# Patient Record
Sex: FEMALE | Race: WHITE | NOT HISPANIC OR LATINO | Employment: OTHER | URBAN - METROPOLITAN AREA
[De-identification: names, ages, dates, MRNs, and addresses within clinical notes are randomized per-mention and may not be internally consistent; named-entity substitution may affect disease eponyms.]

---

## 2019-10-29 ENCOUNTER — RECORDS - HEALTHEAST (OUTPATIENT)
Dept: LAB | Facility: CLINIC | Age: 84
End: 2019-10-29

## 2019-10-29 LAB
ANION GAP SERPL CALCULATED.3IONS-SCNC: 7 MMOL/L (ref 5–18)
BASOPHILS # BLD AUTO: 0.1 THOU/UL (ref 0–0.2)
BASOPHILS NFR BLD AUTO: 1 % (ref 0–2)
BUN SERPL-MCNC: 16 MG/DL (ref 8–28)
CALCIUM SERPL-MCNC: 8.9 MG/DL (ref 8.5–10.5)
CHLORIDE BLD-SCNC: 99 MMOL/L (ref 98–107)
CO2 SERPL-SCNC: 29 MMOL/L (ref 22–31)
CREAT SERPL-MCNC: 0.62 MG/DL (ref 0.6–1.1)
EOSINOPHIL # BLD AUTO: 0.1 THOU/UL (ref 0–0.4)
EOSINOPHIL NFR BLD AUTO: 2 % (ref 0–6)
ERYTHROCYTE [DISTWIDTH] IN BLOOD BY AUTOMATED COUNT: 13.3 % (ref 11–14.5)
GFR SERPL CREATININE-BSD FRML MDRD: >60 ML/MIN/1.73M2
GLUCOSE BLD-MCNC: 75 MG/DL (ref 70–125)
HCT VFR BLD AUTO: 35.2 % (ref 35–47)
HGB BLD-MCNC: 11.3 G/DL (ref 12–16)
LYMPHOCYTES # BLD AUTO: 2.1 THOU/UL (ref 0.8–4.4)
LYMPHOCYTES NFR BLD AUTO: 29 % (ref 20–40)
MCH RBC QN AUTO: 29.7 PG (ref 27–34)
MCHC RBC AUTO-ENTMCNC: 32.1 G/DL (ref 32–36)
MCV RBC AUTO: 92 FL (ref 80–100)
MONOCYTES # BLD AUTO: 0.7 THOU/UL (ref 0–0.9)
MONOCYTES NFR BLD AUTO: 10 % (ref 2–10)
NEUTROPHILS # BLD AUTO: 4.2 THOU/UL (ref 2–7.7)
NEUTROPHILS NFR BLD AUTO: 59 % (ref 50–70)
PLATELET # BLD AUTO: 474 THOU/UL (ref 140–440)
PMV BLD AUTO: 9.3 FL (ref 8.5–12.5)
POTASSIUM BLD-SCNC: 5.1 MMOL/L (ref 3.5–5)
RBC # BLD AUTO: 3.81 MILL/UL (ref 3.8–5.4)
SODIUM SERPL-SCNC: 135 MMOL/L (ref 136–145)
WBC: 7.2 THOU/UL (ref 4–11)

## 2019-10-31 ENCOUNTER — RECORDS - HEALTHEAST (OUTPATIENT)
Dept: LAB | Facility: CLINIC | Age: 84
End: 2019-10-31

## 2019-10-31 LAB
ANION GAP SERPL CALCULATED.3IONS-SCNC: 7 MMOL/L (ref 5–18)
BUN SERPL-MCNC: 23 MG/DL (ref 8–28)
CALCIUM SERPL-MCNC: 8.7 MG/DL (ref 8.5–10.5)
CHLORIDE BLD-SCNC: 97 MMOL/L (ref 98–107)
CO2 SERPL-SCNC: 28 MMOL/L (ref 22–31)
CREAT SERPL-MCNC: 0.69 MG/DL (ref 0.6–1.1)
GFR SERPL CREATININE-BSD FRML MDRD: >60 ML/MIN/1.73M2
GLUCOSE BLD-MCNC: 80 MG/DL (ref 70–125)
POTASSIUM BLD-SCNC: 4.3 MMOL/L (ref 3.5–5)
SODIUM SERPL-SCNC: 132 MMOL/L (ref 136–145)

## 2020-02-03 PROBLEM — Z79.899 CONTROLLED SUBSTANCE AGREEMENT SIGNED: Status: ACTIVE | Noted: 2017-07-26

## 2020-02-03 PROBLEM — F02.80 DEMENTIA IN ALZHEIMER'S DISEASE (H): Status: ACTIVE | Noted: 2019-06-13

## 2020-02-03 PROBLEM — M48.061 SPINAL STENOSIS OF LUMBAR REGION WITHOUT NEUROGENIC CLAUDICATION: Status: ACTIVE | Noted: 2019-11-19

## 2020-02-03 PROBLEM — G30.9 DEMENTIA IN ALZHEIMER'S DISEASE (H): Status: ACTIVE | Noted: 2019-06-13

## 2020-02-03 PROBLEM — I87.2 VENOUS STASIS DERMATITIS OF LEFT LOWER EXTREMITY: Status: ACTIVE | Noted: 2019-07-09

## 2020-02-03 PROBLEM — K59.03 DRUG-INDUCED CONSTIPATION: Status: ACTIVE | Noted: 2019-07-09

## 2020-02-03 PROBLEM — S32.000A CLOSED COMPRESSION FRACTURE OF LUMBOSACRAL SPINE (H): Status: ACTIVE | Noted: 2017-08-23

## 2020-02-03 PROBLEM — M25.552 PAIN OF LEFT HIP JOINT: Status: ACTIVE | Noted: 2017-07-26

## 2020-02-03 PROBLEM — R41.82 ALTERED MENTAL STATUS: Status: ACTIVE | Noted: 2019-12-24

## 2020-02-03 PROBLEM — G89.4 CHRONIC PAIN DISORDER: Status: ACTIVE | Noted: 2019-11-19

## 2020-02-03 NOTE — PROGRESS NOTES
[facility]  PRIMARY CARE PROVIDER AND CLINIC:  TASHIA Caro CNP, [address] [facility] [address] / [place] [address]  Chief Complaint   Patient presents with     Establish Care     [facility] Medical Record Number:  [id number]  Place of Service where encounter took place:  [facility] ([facility]) [[id number]]    HPI:    HPI information obtained from: facility chart records, facility staff, patient report, [facility] chart review and Care Everywhere [facility] chart review.     Litzy Kenny  is a 87 year old  (4/10/1932) female with a medical history of atrial fibrillation, chronic back pain with compression fractures and stenosis, dementia, anemia and osteoporosis. She was previously living at home independently with her spouse whom helped care for her. She had a fall at home and developed worsening back pain and altered mental status. She was taken to the ED for evaluation and found to have an acute compression fracture and L1 and L3, and increase in severe central compression fracture of L2. Oxycodone was increased from 30 mg to 40 mg for pain control but had worsening mental status so subsequently changed to dilaudid. [facility] hospital stay 12/23/19-12/27/19. She then transferred to [facility] TCU for stay 12/27/19-1/22/20. While there, she developed worsening cognition while on the dilaudid so this was decreased from QID to TID and she did not have rebound in pain. She was ambulating up to 300 feet with FWW and further status as noted below.   Given the need for increased care needs, her and spouse moved into the [facility] for further assistance.     Mobility: Ambulating with a front-wheeled walker independently up to 300 feet.  Balance: Tinetti Balance Assessment 20/28 (24-28 = low fall risk; 19-23 = medium fall risk; 0-18 = high fall risk)  ADLs: Upper Extremity Dressing: independent  Lower Extremity Dressing: independent  Toileting: independent  Hygiene and Grooming: independent with  sponge bathing and moderate assistance with showers  Kitchen: dependent     Ms. Kenny was visited today while in her room, doing the dishes while her spouse was eating lunch. She asks that we come back for a visit after her spouse finished his lunch. Upon return to the room 30 minutes later, she invites us to sit down but becomes irritable when a paper and her purse was set to the side. She asked that things not be touched. She does not complain of back pain today and there is not pain radiating to either leg. She is not sleeping well, generally goes to bed around 2 a.m. she is having regular bowel movements. Denies palpitations. Eating fair. She states that she helps take care of her spouse.     Spoke with residents daughter today whom reports that her mother has had long term back pain in which she was taking upwards of 40-60 mg of oxycodone per day and they were working to taper down and was able to get to 20 mg per day this past summer. Family has noticed mood/personality changes with progression of dementia and feel she would benefit from an antidepressant if patient will allow. Daughter and her brother fear that their parents may not be able to live together in the future as there is tension between them.     CODE SDNR / DNITATUS/ADVANCE DIRECTIVES DISCUSSION:     Patient's living condition: lives with spouse  ALLERGIES: No clinical screening - see comments and Tramadol  PAST MEDICAL HISTORY:  has a past medical history of Cancer (H), Cataract, and Cystoid macular edema.  PAST SURGICAL HISTORY:   has a past surgical history that includes cataract iol, rt/lt.  FAMILY HISTORY: family history includes Amblyopia in her daughter; Cancer in her mother; Cerebrovascular Disease in her father; Macular Degeneration in her sister; Strabismus in her daughter.  SOCIAL HISTORY:   reports that she has never smoked. She has never used smokeless tobacco. She reports previous alcohol use.    Post Discharge Medication  Reconciliation Status: discharge medications reconciled, continue medications without change    Current Outpatient Medications   Medication Sig Dispense Refill     calcitonin, salmon, (MIACALCIN) 200 UNIT/ACT nasal spray Spray 1 spray into one nostril alternating nostrils daily Alternate nostril each day.       calcium carbonate-vitamin D (OS-KEYANNA) 600-400 MG-UNIT chewable tablet Take 1 chew tab by mouth 2 times daily       carvedilol (COREG) 3.125 MG tablet Take 1 tablet (3.125 mg) by mouth 2 times daily (with meals)       Cetaphil Moisturizing (CETAPHIL) external lotion Apply topically 2 times daily       HYDROmorphone (DILAUDID) 2 MG tablet Take 1 tablet (2 mg) by mouth every 6 hours as needed for pain 60 tablet 0     Lidocaine (LIDOCARE) 4 % Patch Place 1 patch onto the skin every 24 hours To prevent lidocaine toxicity, patient should be patch free for 12 hrs daily.       mirabegron (MYRBETRIQ) 50 MG 24 hr tablet Take 1 tablet (50 mg) by mouth daily       vitamin D3 (CHOLECALCIFEROL) 2000 units (50 mcg) tablet Take 1 tablet (2,000 Units) by mouth daily       ROS:  4 point ROS including Respiratory, CV, GI and , other than that noted in the HPI,  is negative    Vitals:  Exam:  GENERAL APPEARANCE:  Alert, in no distress, pleasant, cooperative  EYES: no discharge or mattering on lids or lashes noted  ENT:  moist mucous membranes, hearing acuity intact  NECK: supple, symmetrical  RESP: no respiratory distress, Lung sounds clear, patient is on room air  CV:  rate and rhythm regular, no murmur. Edema trace in bilateral lower extremities. VASCULAR: warm extremities without open areas. Venous color changes to both extremities  ABDOMEN: normal bowel sounds, soft, nontender.  M/S:   Gait and station: ambulates with walker, no tenderness or swelling of the joints; able to move all extremities   SKIN:  Inspection and palpation of skin and subcutaneous tissue: skin warm, dry and intact without rashes  NEURO: no facial  asymmetry, no speech deficits and able to follow directions, moves all extremities symmetrically  PSYCH:  insight, judgement, and memory impaired affect and mood normal    Lab/Diagnostic data:  Reviewed in care everywhere  MRI of the lumbar spine which showed acute compression fracture at L3, increased severe central compression fracture of L2, and new mild compression fracture of L1. Her severe spinal stenosis at L3-4 with slightly increased from previous along with chronic severe canal narrowing at L4-5 left foraminal narrowing at L5-S1 and chronic compression fracture of L4.    ASSESSMENT/PLAN:  Closed compression fracture of lumbosacral spine with routine healing, subsequent encounter   Spinal stenosis of lumbar region without neurogenic claudication   Chronic bilateral low back pain without sciatica   Chronic pain syndrome   Patient has long history of chronic back pain. Imaging during hospitalization revealed new L3 and L1 compression fracture sustained from fall. Previousy taking 40 mg oxycodone per day which was changed to dilaudid due to altered mental status. This change appears to have helped pain as she does not complain of pain today and looks comfortable. Although patient denies radiculpathy, daughter states she often has pain down left leg. She has tried gabapentin in the past which did not work.   --continue dilaudid 2 mg TID for now as she settles in, but would like to taper off in the future  --continue tylenol 500 mg three times a day  --continue aspercreame/lidoderm patch once a day  --could cnosider retrialing gabapentin in the future as well as duloxetine  --would benefit from PT/OT but she has declined at this time    Dementia in Alzheimer's disease    Patient is currely living with spouse who seems to provide remeinders to one another. Family has noted some mood changes as dementia has progressed. She does appear irritable today so would like to start duloxetine not only for pain control but  to help with mood. Patient is currenlty makes own decisions, daughter agrees with starting med but does not want to start this until speaking with patient at next visit.  --continue with assisted living for assistance with cares, meals and medications.  --start duloxetine if patient allows     Atrial Fibrillation  Essential hypertension   Per chart review cardiology offic visit, patient declined long term anticoagulation. Today patient heart rate is  and not controlled but decline medication adjustments. /74. Would like to see BP under 150/90 given her age.  --continue carvedilol 3.125 mg twice a day  --Continue to monitor blood pressure and heart rate, adjust medications as needed.    Insomnia  Patient is up until 2-3 am. Sleeps 4 hrs a night which seems to be baseline. Melatonin was offered and patient declined.  --continue to monitor sleep and encourage good sleep hygeine.     Stasis ulcer of left lower extremity--resolved  Per daughter, took 6+ months to heal and appeared to heal at the TCU.     Osteoporosis  --continue with calcitonin spray and calcium/vit D supplements    Total time spent with patient visit at the skilled nursing facility was 55  min including patient visit, review of past records and phone call to patient contact. Greater than 50% of total time spent with counseling and coordinating care due to new admission to the facility, irritability and depressed mood, insomnia.     Electronically signed by:  TASHIA Caro CNP

## 2020-02-04 ENCOUNTER — ASSISTED LIVING VISIT (OUTPATIENT)
Dept: GERIATRICS | Facility: CLINIC | Age: 85
End: 2020-02-04
Payer: MEDICARE

## 2020-02-04 DIAGNOSIS — I87.2 VENOUS STASIS DERMATITIS OF LEFT LOWER EXTREMITY: ICD-10-CM

## 2020-02-04 DIAGNOSIS — G30.9 DEMENTIA IN ALZHEIMER'S DISEASE (H): ICD-10-CM

## 2020-02-04 DIAGNOSIS — M54.50 CHRONIC BILATERAL LOW BACK PAIN WITHOUT SCIATICA: ICD-10-CM

## 2020-02-04 DIAGNOSIS — F02.80 DEMENTIA IN ALZHEIMER'S DISEASE (H): ICD-10-CM

## 2020-02-04 DIAGNOSIS — I48.0 PAROXYSMAL ATRIAL FIBRILLATION (H): ICD-10-CM

## 2020-02-04 DIAGNOSIS — G89.4 CHRONIC PAIN DISORDER: ICD-10-CM

## 2020-02-04 DIAGNOSIS — M81.0 OSTEOPOROSIS WITHOUT CURRENT PATHOLOGICAL FRACTURE, UNSPECIFIED OSTEOPOROSIS TYPE: ICD-10-CM

## 2020-02-04 DIAGNOSIS — I10 BENIGN ESSENTIAL HYPERTENSION: ICD-10-CM

## 2020-02-04 DIAGNOSIS — G89.29 CHRONIC BILATERAL LOW BACK PAIN WITHOUT SCIATICA: ICD-10-CM

## 2020-02-04 DIAGNOSIS — M48.061 SPINAL STENOSIS OF LUMBAR REGION WITHOUT NEUROGENIC CLAUDICATION: ICD-10-CM

## 2020-02-04 DIAGNOSIS — F51.01 PRIMARY INSOMNIA: ICD-10-CM

## 2020-02-04 DIAGNOSIS — Z76.89 ENCOUNTER TO ESTABLISH CARE: Primary | ICD-10-CM

## 2020-02-04 PROCEDURE — 99207 ZZC CDG-HISTORY COMP: MEETS EXP. PROBLEM FOCUSED-DOWN CODED LACK OF ROS: CPT | Performed by: NURSE PRACTITIONER

## 2020-02-04 NOTE — LETTER
2/4/2020        RE: Litzy Kenny  [facility]  [address]  [facility] [address]        [facility]  PRIMARY CARE PROVIDER AND CLINIC:  TASHIA Caro CNP, [address] [place] [address] / [place] [address]  Chief Complaint   Patient presents with     Establish Care     [facility] Medical Record Number:  [id number]  Place of Service where encounter took place:  [facility] ([facility]) [[id number]]    HPI:    HPI information obtained from: facility chart records, facility staff, patient report, [facility] chart review and Care Everywhere [facility] chart review.     Litzy Kenny  is a 87 year old  (4/10/1932) female with a medical history of atrial fibrillation, chronic back pain with compression fractures and stenosis, dementia, anemia and osteoporosis. She was previously living at home independently with her spouse whom helped care for her. She had a fall at home and developed worsening back pain and altered mental status. She was taken to the ED for evaluation and found to have an acute compression fracture and L1 and L3, and increase in severe central compression fracture of L2. Oxycodone was increased from 30 mg to 40 mg for pain control but had worsening mental status so subsequently changed to dilaudid. [facility] hospital stay 12/23/19-12/27/19. She then transferred to [facility] TCU for stay 12/27/19-1/22/20. While there, she developed worsening cognition while on the dilaudid so this was decreased from QID to TID and she did not have rebound in pain. She was ambulating up to 300 feet with FWW and further status as noted below.   Given the need for increased care needs, her and spouse moved into the [facility] for further assistance.     Mobility: Ambulating with a front-wheeled walker independently up to 300 feet.  Balance: Tinetti Balance Assessment 20/28 (24-28 = low fall risk; 19-23 = medium fall risk; 0-18 = high fall risk)  ADLs: Upper Extremity Dressing:  independent  Lower Extremity Dressing: independent  Toileting: independent  Hygiene and Grooming: independent with sponge bathing and moderate assistance with showers  Kitchen: dependent     Ms. Kenny was visited today while in her room, doing the dishes while her spouse was eating lunch. She asks that we come back for a visit after her spouse finished his lunch. Upon return to the room 30 minutes later, she invites us to sit down but becomes irritable when a paper and her purse was set to the side. She asked that things not be touched. She does not complain of back pain today and there is not pain radiating to either leg. She is not sleeping well, generally goes to bed around 2 a.m. she is having regular bowel movements. Denies palpitations. Eating fair. She states that she helps take care of her spouse.     Spoke with residents daughter today whom reports that her mother has had long term back pain in which she was taking upwards of 40-60 mg of oxycodone per day and they were working to taper down and was able to get to 20 mg per day this past summer. Family has noticed mood/personality changes with progression of dementia and feel she would benefit from an antidepressant if patient will allow. Daughter and her brother fear that their parents may not be able to live together in the future as there is tension between them.     CODE SDNR / DNITATUS/ADVANCE DIRECTIVES DISCUSSION:     Patient's living condition: lives with spouse  ALLERGIES: No clinical screening - see comments and Tramadol  PAST MEDICAL HISTORY:  has a past medical history of Cancer (H), Cataract, and Cystoid macular edema.  PAST SURGICAL HISTORY:   has a past surgical history that includes cataract iol, rt/lt.  FAMILY HISTORY: family history includes Amblyopia in her daughter; Cancer in her mother; Cerebrovascular Disease in her father; Macular Degeneration in her sister; Strabismus in her daughter.  SOCIAL HISTORY:   reports that she has never  smoked. She has never used smokeless tobacco. She reports previous alcohol use.    Post Discharge Medication Reconciliation Status: discharge medications reconciled, continue medications without change    Current Outpatient Medications   Medication Sig Dispense Refill     calcitonin, salmon, (MIACALCIN) 200 UNIT/ACT nasal spray Spray 1 spray into one nostril alternating nostrils daily Alternate nostril each day.       calcium carbonate-vitamin D (OS-KEYANNA) 600-400 MG-UNIT chewable tablet Take 1 chew tab by mouth 2 times daily       carvedilol (COREG) 3.125 MG tablet Take 1 tablet (3.125 mg) by mouth 2 times daily (with meals)       Cetaphil Moisturizing (CETAPHIL) external lotion Apply topically 2 times daily       HYDROmorphone (DILAUDID) 2 MG tablet Take 1 tablet (2 mg) by mouth every 6 hours as needed for pain 60 tablet 0     Lidocaine (LIDOCARE) 4 % Patch Place 1 patch onto the skin every 24 hours To prevent lidocaine toxicity, patient should be patch free for 12 hrs daily.       mirabegron (MYRBETRIQ) 50 MG 24 hr tablet Take 1 tablet (50 mg) by mouth daily       vitamin D3 (CHOLECALCIFEROL) 2000 units (50 mcg) tablet Take 1 tablet (2,000 Units) by mouth daily       ROS:  4 point ROS including Respiratory, CV, GI and , other than that noted in the HPI,  is negative    Vitals:  Exam:  GENERAL APPEARANCE:  Alert, in no distress, pleasant, cooperative  EYES: no discharge or mattering on lids or lashes noted  ENT:  moist mucous membranes, hearing acuity intact  NECK: supple, symmetrical  RESP: no respiratory distress, Lung sounds clear, patient is on room air  CV:  rate and rhythm regular, no murmur. Edema trace in bilateral lower extremities. VASCULAR: warm extremities without open areas. Venous color changes to both extremities  ABDOMEN: normal bowel sounds, soft, nontender.  M/S:   Gait and station: ambulates with walker, no tenderness or swelling of the joints; able to move all extremities   SKIN:  Inspection  and palpation of skin and subcutaneous tissue: skin warm, dry and intact without rashes  NEURO: no facial asymmetry, no speech deficits and able to follow directions, moves all extremities symmetrically  PSYCH:  insight, judgement, and memory impaired affect and mood normal    Lab/Diagnostic data:  Reviewed in care everywhere  MRI of the lumbar spine which showed acute compression fracture at L3, increased severe central compression fracture of L2, and new mild compression fracture of L1. Her severe spinal stenosis at L3-4 with slightly increased from previous along with chronic severe canal narrowing at L4-5 left foraminal narrowing at L5-S1 and chronic compression fracture of L4.    ASSESSMENT/PLAN:  Closed compression fracture of lumbosacral spine with routine healing, subsequent encounter   Spinal stenosis of lumbar region without neurogenic claudication   Chronic bilateral low back pain without sciatica   Chronic pain syndrome   Patient has long history of chronic back pain. Imaging during hospitalization revealed new L3 and L1 compression fracture sustained from fall. Previousy taking 40 mg oxycodone per day which was changed to dilaudid due to altered mental status. This change appears to have helped pain as she does not complain of pain today and looks comfortable. Although patient denies radiculpathy, daughter states she often has pain down left leg. She has tried gabapentin in the past which did not work.   --continue dilaudid 2 mg TID for now as she settles in, but would like to taper off in the future  --continue tylenol 500 mg three times a day  --continue aspercreame/lidoderm patch once a day  --could cnosider retrialing gabapentin in the future as well as duloxetine  --would benefit from PT/OT but she has declined at this time    Dementia in Alzheimer's disease    Patient is currely living with spouse who seems to provide remeinders to one another. Family has noted some mood changes as dementia has  progressed. She does appear irritable today so would like to start duloxetine not only for pain control but to help with mood. Patient is currenlty makes own decisions, daughter agrees with starting med but does not want to start this until speaking with patient at next visit.  --continue with assisted living for assistance with cares, meals and medications.  --start duloxetine if patient allows     Atrial Fibrillation  Essential hypertension   Per chart review cardiology offic visit, patient declined long term anticoagulation. Today patient heart rate is  and not controlled but decline medication adjustments. /74. Would like to see BP under 150/90 given her age.  --continue carvedilol 3.125 mg twice a day  --Continue to monitor blood pressure and heart rate, adjust medications as needed.    Insomnia  Patient is up until 2-3 am. Sleeps 4 hrs a night which seems to be baseline. Melatonin was offered and patient declined.  --continue to monitor sleep and encourage good sleep hygeine.     Stasis ulcer of left lower extremity--resolved  Per daughter, took 6+ months to heal and appeared to heal at the TCU.     Osteoporosis  --continue with calcitonin spray and calcium/vit D supplements    Total time spent with patient visit at the skilled nursing facility was 55  min including patient visit, review of past records and phone call to patient contact. Greater than 50% of total time spent with counseling and coordinating care due to new admission to the facility, irritability and depressed mood, insomnia.     Electronically signed by:  TASHIA Caro CNP                         Sincerely,        TASHIA Caro CNP

## 2020-02-05 RX ORDER — CARVEDILOL 3.12 MG/1
3.12 TABLET ORAL 2 TIMES DAILY WITH MEALS
COMMUNITY
Start: 2020-02-05 | End: 2020-02-16

## 2020-02-05 RX ORDER — CALCITONIN SALMON 200 [IU]/.09ML
1 SPRAY, METERED NASAL DAILY
COMMUNITY
Start: 2020-02-05 | End: 2020-03-05

## 2020-02-05 RX ORDER — VIT E ACET/GLY/DIMETH/WATER
LOTION (ML) TOPICAL 2 TIMES DAILY
COMMUNITY
Start: 2020-02-05 | End: 2021-06-02

## 2020-02-05 RX ORDER — HYDROMORPHONE HYDROCHLORIDE 2 MG/1
2 TABLET ORAL EVERY 6 HOURS PRN
Qty: 60 TABLET | Refills: 0 | Status: SHIPPED | OUTPATIENT
Start: 2020-02-05 | End: 2020-02-25

## 2020-02-06 RX ORDER — LIDOCAINE 4 G/G
1 PATCH TOPICAL EVERY 24 HOURS
COMMUNITY
Start: 2020-02-06 | End: 2020-04-09

## 2020-02-06 RX ORDER — CHOLECALCIFEROL (VITAMIN D3) 50 MCG
1 TABLET ORAL DAILY
COMMUNITY
Start: 2020-02-06 | End: 2020-03-03

## 2020-02-06 RX ORDER — MIRABEGRON 50 MG/1
50 TABLET, EXTENDED RELEASE ORAL DAILY
COMMUNITY
Start: 2020-02-06 | End: 2020-02-16

## 2020-02-17 RX ORDER — AMOXICILLIN 250 MG
1 CAPSULE ORAL DAILY PRN
COMMUNITY
End: 2021-11-10

## 2020-02-18 ENCOUNTER — ASSISTED LIVING VISIT (OUTPATIENT)
Dept: GERIATRICS | Facility: CLINIC | Age: 85
End: 2020-02-18
Payer: MEDICARE

## 2020-02-18 DIAGNOSIS — M48.061 SPINAL STENOSIS OF LUMBAR REGION WITHOUT NEUROGENIC CLAUDICATION: ICD-10-CM

## 2020-02-18 DIAGNOSIS — I10 BENIGN ESSENTIAL HYPERTENSION: Primary | ICD-10-CM

## 2020-02-18 DIAGNOSIS — N32.81 OVERACTIVE BLADDER: ICD-10-CM

## 2020-02-18 DIAGNOSIS — M54.50 CHRONIC BILATERAL LOW BACK PAIN WITHOUT SCIATICA: ICD-10-CM

## 2020-02-18 DIAGNOSIS — G89.29 CHRONIC BILATERAL LOW BACK PAIN WITHOUT SCIATICA: ICD-10-CM

## 2020-02-18 DIAGNOSIS — S32.010D COMPRESSION FRACTURE OF L1 VERTEBRA WITH ROUTINE HEALING, SUBSEQUENT ENCOUNTER: ICD-10-CM

## 2020-02-18 DIAGNOSIS — I48.0 PAROXYSMAL ATRIAL FIBRILLATION (H): ICD-10-CM

## 2020-02-18 DIAGNOSIS — F02.80 DEMENTIA IN ALZHEIMER'S DISEASE (H): ICD-10-CM

## 2020-02-18 DIAGNOSIS — G30.9 DEMENTIA IN ALZHEIMER'S DISEASE (H): ICD-10-CM

## 2020-02-18 NOTE — LETTER
"    2/18/2020        RE: Litzy Kenny  [facility]  [address] [facility] [address]        Litzy Kenny is a 87 year old female seen February 18, 2020 at [facility] where she has resided for one month (admit 1/2020) seen for initial visit.     Pt is seen in her apartment with her  Shan present.   She states she is \"not feeling very good.  I've had a bad back.\"    She also notes urinary problems, manifested by persistent leaking    By chart review, she has been seen Urology for this most recently in April 2019, started on Myrbetriq    Pt was not interested in other options offered, e.g., Botox   Pt has a past h/o PAF, followed by Cardiology with CHADS-VASc score 4.   She has declined anticoagulation.     Pt was hospitalized in October 2019 for venous stasis ulcer, dementia and impaired mobility. She went to TCU then returned home with her 's care.   She was readmitted to [facility] 12/23-27/19 for altered mental status and new compression fractures at L3 and L1.   She also has severe spinal stenosis lumbar spine, and chronic compression fracture L4.     She had been on oxycodone at higher doses for many years with taper attempted last summer.  During hospitalization she was switched to Dilaudid which is currently prn, but pt asking for it TID.          PMH:  Cystoid macular edema, right eye  Cataract  Atrial fibrillation /PAF  Dementia, late onset Alzheimer's type  Osteoporosis   Multiple compression fractures  Chronic bilateral LBP without sciatica  Chronic pain /controlled substance agreement signed  HTN  Anemia  H/o SCC and BCC  Venous stasis ulcers LEs, 2019  Lipodermatosclerosis    SH:  Lives with her  in [place].   They previously lived in a house in [place]; had lived there since 1960.   They have a daughter Nathalie (in [place]), son Reynold (in [place]) and son Ben (in [place]).   Pt is retired from secretarial work for the president of [employer]   Non smoker    ROS: ambulatory with FWW.      SLUMS 9/30  CPT 4.1    Poor appetite  Incontinence  Tinetti 20/28      EXAM:  Very frail, NAD  BP (!) 162/92   Pulse 99   SpO2 98%   Severe kyphosis, folded almost in half when standing  Neck supple without adenopathy  Lungs with decreased BS, no rales or wheeze  Heart RRR s1s2 tachy  Abd soft, NT, no distention, +BS  Ext without edema, wearing compression stockings.   Chronic skin changes on LEs.     Neuro: STML, limited history, no focal deficits  Psych: irritable, guarded    12/24/2019:    Value    Sodium 136 - 145 mmol/L 135Low     Potassium 3.5 - 5.1 mmol/L 4.1    Chloride 98 - 109 mmol/L 102    CO2 20 - 29 mmol/L 26    Anion Gap 7 - 16 mmol/L 7    Calcium 8.4 - 10.4 mg/dL 9.2    BUN 7 - 26 mg/dL 13    Creatinine 0.55 - 1.02 mg/dL 0.55    GFR, Estimated >60 mL/min/1.73m2 >60    GFR, Est If African American >60 mL/min/1.73m2 >60    Glucose 70 - 100 mg/dL 94      Value     WBC 3.5 - 10.5 x10(9)/L 7.6    RBC 3.90 - 5.03 x10(12)/L 3.88Low     Hemoglobin 12.0 - 15.5 g/dL 12.1    HCT 34.9 - 44.5 % 36.7    MCV 80.0 - 100.0 fL 94.6    MCH 27.6 - 33.3 pg 31.2    MCHC 31.5 - 35.2 g/dL 33.0    RDW 11.9 - 15.5 % 15.8High     Platelets 150 - 450 x10(9)/L 293          IMP/PLAN:   (I10) Benign essential hypertension   Comment: bps have been persistently high, with tachycardia  Plan: increase carvedilol to 6.25 mg bid       (S32.010D) Compression fracture of L1 vertebra with routine healing, subsequent encounter  Comment: acute fractures of L1 and L3, chronic L4 compression fracture.   Severe kyphosis     Plan: Miacalcin NS, vit D and calcium.  Ambulation with FWW.     (M48.061) Spinal stenosis of lumbar region without neurogenic claudication  (M54.5,  G89.29) Chronic bilateral low back pain without sciatica  Comment: many years of chronic pain and opioid dependence      Plan: regimen has been decreased, now on Dilaudid 2 mg tid.  Will continue as written, with bowel  regimen  Also on scheduled acetaminophen and topical Aspercreme.      (N32.81) Overactive bladder  Comment: longstanding, still symptomatic despite treatment   Plan: at maximum dose of Myrbetriq.   Has seen Urology and declined other tx  Continue with incontinence products and assist with cares       (I48.0) Paroxysmal atrial fibrillation (H)  Comment: regular today   Plan: carvedilol as above.  She has declined anticoagulation.       (G30.9,  F02.80) Dementia in Alzheimer's disease (H)  Comment: pt is guarded and resistant to changes.  Offered duloxetine for mood and pain, but has declined.     Plan: [place] support for meds, meals, activity.        Gloria Quezada MD       Sincerely,        Gloria Quezada MD

## 2020-02-20 ENCOUNTER — DOCUMENTATION ONLY (OUTPATIENT)
Dept: OTHER | Facility: CLINIC | Age: 85
End: 2020-02-20

## 2020-02-22 VITALS — DIASTOLIC BLOOD PRESSURE: 92 MMHG | SYSTOLIC BLOOD PRESSURE: 162 MMHG | HEART RATE: 99 BPM | OXYGEN SATURATION: 98 %

## 2020-02-23 NOTE — PROGRESS NOTES
"Litzy Kenny is a 87 year old female seen February 18, 2020 at [facility] where she has resided for one month (admit 1/2020) seen for initial visit.     Pt is seen in her apartment with her  Shan present.   She states she is \"not feeling very good.  I've had a bad back.\"    She also notes urinary problems, manifested by persistent leaking    By chart review, she has been seen Urology for this most recently in April 2019, started on Myrbetriq    Pt was not interested in other options offered, e.g., Botox   Pt has a past h/o PAF, followed by Cardiology with CHADS-VASc score 4.   She has declined anticoagulation.     Pt was hospitalized in October 2019 for venous stasis ulcer, dementia and impaired mobility. She went to TCU then returned home with her 's care.   She was readmitted to [facility] 12/23-27/19 for altered mental status and new compression fractures at L3 and L1.   She also has severe spinal stenosis lumbar spine, and chronic compression fracture L4.     She had been on oxycodone at higher doses for many years with taper attempted last summer.  During hospitalization she was switched to Dilaudid which is currently prn, but pt asking for it TID.          PMH:  Cystoid macular edema, right eye  Cataract  Atrial fibrillation /PAF  Dementia, late onset Alzheimer's type  Osteoporosis   Multiple compression fractures  Chronic bilateral LBP without sciatica  Chronic pain /controlled substance agreement signed  HTN  Anemia  H/o SCC and BCC  Venous stasis ulcers LEs, 2019  Lipodermatosclerosis    SH:  Lives with her  in [place].   They previously lived in a house in [place]; had lived there since 1960.   They have a daughter Nathalie (in [place]), son Reynold (in [place]) and son Ben (in [place]).   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   Non smoker    ROS: ambulatory with FWW.      SLUMS 9/30  CPT 4.1    Poor appetite  Incontinence  Tinetti " 20/28      EXAM:  Very frail, NAD  BP (!) 162/92   Pulse 99   SpO2 98%   Severe kyphosis, folded almost in half when standing  Neck supple without adenopathy  Lungs with decreased BS, no rales or wheeze  Heart RRR s1s2 tachy  Abd soft, NT, no distention, +BS  Ext without edema, wearing compression stockings.   Chronic skin changes on LEs.     Neuro: STML, limited history, no focal deficits  Psych: irritable, guarded    12/24/2019:    Value    Sodium 136 - 145 mmol/L 135Low     Potassium 3.5 - 5.1 mmol/L 4.1    Chloride 98 - 109 mmol/L 102    CO2 20 - 29 mmol/L 26    Anion Gap 7 - 16 mmol/L 7    Calcium 8.4 - 10.4 mg/dL 9.2    BUN 7 - 26 mg/dL 13    Creatinine 0.55 - 1.02 mg/dL 0.55    GFR, Estimated >60 mL/min/1.73m2 >60    GFR, Est If African American >60 mL/min/1.73m2 >60    Glucose 70 - 100 mg/dL 94      Value     WBC 3.5 - 10.5 x10(9)/L 7.6    RBC 3.90 - 5.03 x10(12)/L 3.88Low     Hemoglobin 12.0 - 15.5 g/dL 12.1    HCT 34.9 - 44.5 % 36.7    MCV 80.0 - 100.0 fL 94.6    MCH 27.6 - 33.3 pg 31.2    MCHC 31.5 - 35.2 g/dL 33.0    RDW 11.9 - 15.5 % 15.8High     Platelets 150 - 450 x10(9)/L 293          IMP/PLAN:   (I10) Benign essential hypertension   Comment: bps have been persistently high, with tachycardia  Plan: increase carvedilol to 6.25 mg bid       (S32.010D) Compression fracture of L1 vertebra with routine healing, subsequent encounter  Comment: acute fractures of L1 and L3, chronic L4 compression fracture.   Severe kyphosis     Plan: Miacalcin NS, vit D and calcium.  Ambulation with FWW.     (M48.061) Spinal stenosis of lumbar region without neurogenic claudication  (M54.5,  G89.29) Chronic bilateral low back pain without sciatica  Comment: many years of chronic pain and opioid dependence      Plan: regimen has been decreased, now on Dilaudid 2 mg tid.  Will continue as written, with bowel regimen  Also on scheduled acetaminophen and topical Aspercreme.      (N32.81) Overactive bladder  Comment:  longstanding, still symptomatic despite treatment   Plan: at maximum dose of Myrbetriq.   Has seen Urology and declined other tx  Continue with incontinence products and assist with cares       (I48.0) Paroxysmal atrial fibrillation (H)  Comment: regular today   Plan: carvedilol as above.  She has declined anticoagulation.       (G30.9,  F02.80) Dementia in Alzheimer's disease (H)  Comment: pt is guarded and resistant to changes.  Offered duloxetine for mood and pain, but has declined.     Plan: [place] support for meds, meals, activity.        Gloria Quezada MD

## 2020-02-24 DIAGNOSIS — M54.50 CHRONIC BILATERAL LOW BACK PAIN WITHOUT SCIATICA: ICD-10-CM

## 2020-02-24 DIAGNOSIS — G89.29 CHRONIC BILATERAL LOW BACK PAIN WITHOUT SCIATICA: ICD-10-CM

## 2020-02-24 NOTE — TELEPHONE ENCOUNTER
Hello,    We need a new order for this patient's Hydromorphone order. This order was changed on 2/18 and signed on a [facility] forms.    It was changed to 2 mg TID and the facility is requesting for it by tomorrow.  We are also wondering if the PRN dose is to be continued or discontinued.    Thank you,  Tobias Bhatt  [facility]

## 2020-02-25 DIAGNOSIS — I48.0 PAROXYSMAL ATRIAL FIBRILLATION (H): ICD-10-CM

## 2020-02-25 DIAGNOSIS — S32.010D COMPRESSION FRACTURE OF L1 VERTEBRA WITH ROUTINE HEALING, SUBSEQUENT ENCOUNTER: Primary | ICD-10-CM

## 2020-02-25 RX ORDER — HYDROMORPHONE HYDROCHLORIDE 2 MG/1
2 TABLET ORAL 3 TIMES DAILY
Qty: 60 TABLET | Refills: 0 | Status: SHIPPED | OUTPATIENT
Start: 2020-02-25 | End: 2020-03-12

## 2020-02-26 RX ORDER — HYDROMORPHONE HYDROCHLORIDE 2 MG/1
TABLET ORAL
Refills: 0 | OUTPATIENT
Start: 2020-02-26

## 2020-03-02 ASSESSMENT — MIFFLIN-ST. JEOR
SCORE: 743.87
SCORE: 743.87
SCORE: 748.87

## 2020-03-02 NOTE — PROGRESS NOTES
"[facility]  [facility] Medical Record Number:  [id number]  [id number]  Chief Complaint   Patient presents with     RECHECK       HPI:    Litzy Kenny  is a 87 year old (4/10/1932), who is being seen today for an episodic care visit.  HPI information obtained from: facility chart records, facility staff and patient report     Ms. Kenny was visited today while in her room walking to the couch. Her shoes were not tied and when asked if I could tie them she said no, they are supposed to be that way. She then walked to the kitchen and sat in the chair. She complains of \"bladder problems\" and it does not appear the medication is working. She would like to continue this. Back pain is always present but feels okay today.     Past Medical and Surgical History reviewed in Epic today.    MEDICATIONS:  Current Outpatient Medications   Medication Sig Dispense Refill     ACETAMINOPHEN EXTRA STRENGTH 500 MG tablet TAKE TWO TABLETS (1000MG) BY MOUTH THREE TIMES DAILY 60 tablet PRN     calcitonin, salmon, (MIACALCIN) 200 UNIT/ACT nasal spray Spray 1 spray into one nostril alternating nostrils daily Alternate nostril each day.       calcium carbonate 600 mg-vitamin D 400 units (CALTRATE) 600-400 MG-UNIT per tablet TAKE 1 TABLET BY MOUTH TWICE DAILY 60 tablet PRN     carvedilol (COREG) 3.125 MG tablet TAKE 1 TABLET BY MOUTH TWICE DAILY WITH MEALS 60 tablet PRN     Cetaphil Moisturizing (CETAPHIL) external lotion Apply topically 2 times daily       HYDROmorphone (DILAUDID) 2 MG tablet Take 1 tablet (2 mg) by mouth 3 times daily 60 tablet 0     Lidocaine (LIDOCARE) 4 % Patch Place 1 patch onto the skin every 24 hours To prevent lidocaine toxicity, patient should be patch free for 12 hrs daily.       MYRBETRIQ 50 MG 24 hr tablet TAKE 1 TABLET BY MOUTH ONCE DAILY 30 tablet PRN     senna-docusate (SENOKOT-S/PERICOLACE) 8.6-50 MG tablet Take 1 tablet by " "mouth daily as needed for constipation       VITAMIN D3 25 MCG (1000 UT) tablet TAKE TWO TABLETS (2000 UNITS) BY MOUTH ONCE DAILY 60 tablet PRN       REVIEW OF SYSTEMS:  4 point ROS including Respiratory, CV, GI and , other than that noted in the HPI,  is negative    Objective:  BP (!) 141/66   Pulse 98   Temp 97  F (36.1  C)   Ht 1.499 m (4' 11\")   Wt 40.8 kg (90 lb)   BMI 18.18 kg/m    Exam:  GENERAL APPEARANCE:  Alert, in no distress, pleasant, cooperative  EYES: no discharge or mattering on lids or lashes noted  ENT:  moist mucous membranes, hearing acuity intact  NECK: supple, symmetrical  RESP: no respiratory distress, patient is on room air  CV:  Edema trace in bilateral lower extremities.   M/S:   Gait and station: ambulates with walker, no tenderness or swelling of the joints; able to move all extremities, kyphosis  NEURO: no facial asymmetry, no speech deficits and able to follow directions, moves all extremities symmetrically  PSYCH:  insight, judgement, and memory impaired affect and mood normal    Labs:   Reviewed in care everywhere    ASSESSMENT/PLAN:  (I48.0) Paroxysmal atrial fibrillation (H)  (primary encounter diagnosis)  Comment: HR better controlled (89) today after increasing carvedilol two weeks ago.   Plan:  Continue with carvedilol 6.25 mg BID   --monitor HR and adjust dose as needed    (M54.5,  G89.29) Chronic bilateral low back pain without sciatica  Comment: appears comfortable today. Continues to decline PT which would help greatly.  Plan: continue with tylenol 1000 mg TID   --dilaudid 2 mg TID     (N32.81) Overactive bladder  Comment: discussed stopping the myrbetriq as it does not appear to be helping her urinary frequency or overactive bladder. She would like to continue at this time.   Plan: continue with myrbetriq 50 mg daily.     Called and updated Kat, daughter.     Electronically signed by:  TASHIA Caro CNP           "

## 2020-03-03 ENCOUNTER — ASSISTED LIVING VISIT (OUTPATIENT)
Dept: GERIATRICS | Facility: CLINIC | Age: 85
End: 2020-03-03
Payer: MEDICARE

## 2020-03-03 VITALS
SYSTOLIC BLOOD PRESSURE: 141 MMHG | WEIGHT: 90 LBS | DIASTOLIC BLOOD PRESSURE: 66 MMHG | BODY MASS INDEX: 18.14 KG/M2 | TEMPERATURE: 97 F | HEIGHT: 59 IN | HEART RATE: 98 BPM

## 2020-03-03 DIAGNOSIS — M54.50 CHRONIC BILATERAL LOW BACK PAIN WITHOUT SCIATICA: ICD-10-CM

## 2020-03-03 DIAGNOSIS — N32.81 OVERACTIVE BLADDER: ICD-10-CM

## 2020-03-03 DIAGNOSIS — I48.0 PAROXYSMAL ATRIAL FIBRILLATION (H): Primary | ICD-10-CM

## 2020-03-03 DIAGNOSIS — G89.29 CHRONIC BILATERAL LOW BACK PAIN WITHOUT SCIATICA: ICD-10-CM

## 2020-03-03 NOTE — LETTER
"    3/3/2020        RE: Litzy Kenny  [facility]  [address] [address] [address]        [facility]  [facility] Medical Record Number:  [id number]  Place of Service where encounter took place:  [facility] [[id number]]  Chief Complaint   Patient presents with     RECHECK       HPI:    Litzy Kenny  is a 87 year old (4/10/1932), who is being seen today for an episodic care visit.  HPI information obtained from: facility chart records, facility staff and patient report     Ms. Kenny was visited today while in her room walking to the couch. Her shoes were not tied and when asked if I could tie them she said no, they are supposed to be that way. She then walked to the kitchen and sat in the chair. She complains of \"bladder problems\" and it does not appear the medication is working. She would like to continue this. Back pain is always present but feels okay today.     Past Medical and Surgical History reviewed in Epic today.    MEDICATIONS:  Current Outpatient Medications   Medication Sig Dispense Refill     ACETAMINOPHEN EXTRA STRENGTH 500 MG tablet TAKE TWO TABLETS (1000MG) BY MOUTH THREE TIMES DAILY 60 tablet PRN     calcitonin, salmon, (MIACALCIN) 200 UNIT/ACT nasal spray Spray 1 spray into one nostril alternating nostrils daily Alternate nostril each day.       calcium carbonate 600 mg-vitamin D 400 units (CALTRATE) 600-400 MG-UNIT per tablet TAKE 1 TABLET BY MOUTH TWICE DAILY 60 tablet PRN     carvedilol (COREG) 3.125 MG tablet TAKE 1 TABLET BY MOUTH TWICE DAILY WITH MEALS 60 tablet PRN     Cetaphil Moisturizing (CETAPHIL) external lotion Apply topically 2 times daily       HYDROmorphone (DILAUDID) 2 MG tablet Take 1 tablet (2 mg) by mouth 3 times daily 60 tablet 0     Lidocaine (LIDOCARE) 4 % Patch Place 1 patch onto the skin every 24 hours To prevent lidocaine toxicity, patient should be patch free for 12 hrs daily.       MYRBETRIQ 50 MG 24 hr tablet TAKE 1 TABLET " "BY MOUTH ONCE DAILY 30 tablet PRN     senna-docusate (SENOKOT-S/PERICOLACE) 8.6-50 MG tablet Take 1 tablet by mouth daily as needed for constipation       VITAMIN D3 25 MCG (1000 UT) tablet TAKE TWO TABLETS (2000 UNITS) BY MOUTH ONCE DAILY 60 tablet PRN       REVIEW OF SYSTEMS:  4 point ROS including Respiratory, CV, GI and , other than that noted in the HPI,  is negative    Objective:  BP (!) 141/66   Pulse 98   Temp 97  F (36.1  C)   Ht 1.499 m (4' 11\")   Wt 40.8 kg (90 lb)   BMI 18.18 kg/m     Exam:  GENERAL APPEARANCE:  Alert, in no distress, pleasant, cooperative  EYES: no discharge or mattering on lids or lashes noted  ENT:  moist mucous membranes, hearing acuity intact  NECK: supple, symmetrical  RESP: no respiratory distress, patient is on room air  CV:  Edema trace in bilateral lower extremities.   M/S:   Gait and station: ambulates with walker, no tenderness or swelling of the joints; able to move all extremities, kyphosis  NEURO: no facial asymmetry, no speech deficits and able to follow directions, moves all extremities symmetrically  PSYCH:  insight, judgement, and memory impaired affect and mood normal    Labs:   Reviewed in care everywhere    ASSESSMENT/PLAN:  (I48.0) Paroxysmal atrial fibrillation (H)  (primary encounter diagnosis)  Comment: HR better controlled (89) today after increasing carvedilol two weeks ago.   Plan:  Continue with carvedilol 6.25 mg BID   --monitor HR and adjust dose as needed    (M54.5,  G89.29) Chronic bilateral low back pain without sciatica  Comment: appears comfortable today. Continues to decline PT which would help greatly.  Plan: continue with tylenol 1000 mg TID   --dilaudid 2 mg TID     (N32.81) Overactive bladder  Comment: discussed stopping the myrbetriq as it does not appear to be helping her urinary frequency or overactive bladder. She would like to continue at this time.   Plan: continue with myrbetriq 50 mg daily.     Called and updated Kat, daughter. "     Electronically signed by:  TASHIA Caro CNP               Sincerely,        TASHIA Caro CNP

## 2020-03-05 ENCOUNTER — RECORDS - HEALTHEAST (OUTPATIENT)
Dept: LAB | Facility: CLINIC | Age: 85
End: 2020-03-05

## 2020-03-05 ENCOUNTER — TRANSFERRED RECORDS (OUTPATIENT)
Dept: HEALTH INFORMATION MANAGEMENT | Facility: CLINIC | Age: 85
End: 2020-03-05

## 2020-03-05 DIAGNOSIS — M81.0 OSTEOPOROSIS WITHOUT CURRENT PATHOLOGICAL FRACTURE, UNSPECIFIED OSTEOPOROSIS TYPE: Primary | ICD-10-CM

## 2020-03-05 LAB
SODIUM SERPL-SCNC: 137 MMOL/L (ref 136–145)
SODIUM SERPL-SCNC: 137 MMOL/L (ref 136–145)

## 2020-03-05 RX ORDER — CALCITONIN SALMON 200 [IU]/.09ML
1 SPRAY, METERED NASAL DAILY
Qty: 1 BOTTLE | Refills: 3 | Status: SHIPPED | OUTPATIENT
Start: 2020-03-05 | End: 2020-09-24

## 2020-03-06 LAB — 25(OH)D3 SERPL-MCNC: 27.7 NG/ML (ref 30–80)

## 2020-03-12 DIAGNOSIS — S32.010D COMPRESSION FRACTURE OF L1 VERTEBRA WITH ROUTINE HEALING, SUBSEQUENT ENCOUNTER: ICD-10-CM

## 2020-03-12 RX ORDER — HYDROMORPHONE HYDROCHLORIDE 2 MG/1
TABLET ORAL
Qty: 60 TABLET | Refills: 0 | Status: SHIPPED | OUTPATIENT
Start: 2020-03-12 | End: 2020-04-02

## 2020-03-13 DIAGNOSIS — I48.0 PAROXYSMAL ATRIAL FIBRILLATION (H): Primary | ICD-10-CM

## 2020-03-14 RX ORDER — CARVEDILOL 6.25 MG/1
TABLET ORAL
Qty: 56 TABLET | Status: SHIPPED | OUTPATIENT
Start: 2020-03-14 | End: 2020-09-29

## 2020-04-01 ENCOUNTER — VIRTUAL VISIT (OUTPATIENT)
Dept: GERIATRICS | Facility: CLINIC | Age: 85
End: 2020-04-01
Payer: MEDICARE

## 2020-04-01 VITALS
DIASTOLIC BLOOD PRESSURE: 66 MMHG | HEIGHT: 59 IN | WEIGHT: 90 LBS | BODY MASS INDEX: 18.14 KG/M2 | HEART RATE: 98 BPM | SYSTOLIC BLOOD PRESSURE: 141 MMHG | RESPIRATION RATE: 20 BRPM | TEMPERATURE: 97 F

## 2020-04-01 DIAGNOSIS — G89.29 CHRONIC BILATERAL THORACIC BACK PAIN: ICD-10-CM

## 2020-04-01 DIAGNOSIS — M54.6 CHRONIC BILATERAL THORACIC BACK PAIN: ICD-10-CM

## 2020-04-01 DIAGNOSIS — M79.652 PAIN OF LEFT THIGH: Primary | ICD-10-CM

## 2020-04-01 RX ORDER — HYDROMORPHONE HYDROCHLORIDE 2 MG/1
2 TABLET ORAL EVERY 6 HOURS PRN
COMMUNITY
End: 2020-04-02

## 2020-04-01 ASSESSMENT — MIFFLIN-ST. JEOR: SCORE: 748.87

## 2020-04-01 NOTE — LETTER
"    4/1/2020        RE: Litzy Kenny  [facility]  [address] [address] [address] [facility] [address] [address]        [facility] E-VISIT   Litzy Kenny is being evaluated via a billable video visit due to the restrictions of the Covid-19 pandemic.   The patient has been notified of following: \"This video visit will be conducted via a call between you and your provider. We have found that certain health care needs can be provided without the need for an in-person physical exam.  This service lets us provide the care you need with a video conversation. If during the course of the call the provider feels a video visit is not appropriate, you will not be charged for this service.\"   The provider has received verbal consent for a Video Visit from the patient or first contact? Yes  Video Start Time: 3:12  [facility] Medical Record Number:  [id number]  Place of Location at the time of visit: [facility]  Chief Complaint   Patient presents with     Nursing Home Acute     HPI:  Litzy Kenny  is a 87 year old (4/10/1932), who is being seen today for an E-visit.  HPI information obtained from: facility chart records, facility staff, patient report and [facility] chart review.    Ms. Kenny was visited virtually today with staff RN present. She reports that she has had increasing left thigh pain which she believes was from standing too long while eating dinner last week. She does not believe that this is originating from her back. Cannot fully describe the pain given cognitive impairment but states it is in her thigh and asks if she can have a lidoderm patch to put in place. She has been sleeping well. Does not have any other concerns at this time.     Past Medical and Surgical History reviewed in Epic today.  MEDICATIONS:  Current Outpatient Medications   Medication Sig Dispense Refill     ACETAMINOPHEN EXTRA STRENGTH 500 MG tablet TAKE TWO TABLETS (1000MG) BY MOUTH THREE TIMES DAILY 60 tablet PRN     " "calcitonin, salmon, (MIACALCIN) 200 UNIT/ACT nasal spray Spray 1 spray into one nostril alternating nostrils daily Alternate nostril each day. 1 Bottle 3     calcium carbonate 600 mg-vitamin D 400 units (CALTRATE) 600-400 MG-UNIT per tablet TAKE 1 TABLET BY MOUTH TWICE DAILY 60 tablet PRN     carvedilol (COREG) 6.25 MG tablet TAKE 1 TABLET BY MOUTH TWICE DAILY 56 tablet PRN     Cetaphil Moisturizing (CETAPHIL) external lotion Apply topically 2 times daily       HYDROmorphone (DILAUDID) 2 MG tablet TAKE 1 TABLET BY MOUTH THREE TIMES DAILY 60 tablet 0     Lidocaine (LIDOCARE) 4 % Patch Place 1 patch onto the skin every 24 hours To prevent lidocaine toxicity, patient should be patch free for 12 hrs daily.       MYRBETRIQ 50 MG 24 hr tablet TAKE 1 TABLET BY MOUTH ONCE DAILY 30 tablet PRN     senna-docusate (SENOKOT-S/PERICOLACE) 8.6-50 MG tablet Take 1 tablet by mouth daily as needed for constipation       VITAMIN D3 25 MCG (1000 UT) tablet TAKE TWO TABLETS (2000 UNITS) BY MOUTH ONCE DAILY 60 tablet PRN     REVIEW OF SYSTEMS: 4 point ROS including Respiratory, CV, GI and , other than that noted in the HPI,  is negative  Objective: BP (!) 141/66   Pulse 98   Temp 97  F (36.1  C)   Resp 20   Ht 1.499 m (4' 11\")   Wt 40.8 kg (90 lb)   BMI 18.18 kg/m     E-visit exam done given COVID-19 precautions.   GENERAL APPEARANCE:  Alert, in no distress, pleasant, cooperative  EYES: no discharge or mattering on lids or lashes noted  ENT:  moist mucous membranes, hearing acuity intact  NECK: supple, symmetrical  RESP: no respiratory distress, patient is on room air  M/S:   Gait and station: ambulates with walker, kyphosis, able to move all extremities  NEURO: no facial asymmetry, no speech deficits and able to follow directions, moves all extremities symmetrically  PSYCH:  insight, judgement, and memory impaired affect and mood normal    Labs:     Last Basic Metabolic Panel:  Recent Labs   Lab Test 03/05/20    "       ASSESSMENT/PLAN:  Left thigh pain  Chronic back pain  Has had increasing thigh pain which she does not recall trauma so presumably sciatica from her compression fractures and spinal stenosis. She asks for a lidocaine patch to be placed on the left thigh since that helps with her back pain. She is currently on dilaudid three times daily which is not ideal and would like to get her off that in the future. She has tried gabapentin in the past but it may be worth trialing again if therapy does not improve the pain  --tylenol 1000 mg TID  --dilaudid 2 mg TID  --PT for strengthening  --lidocaine 4% patch to back and left thigh daily and off at HS.       Electronically signed by:  TASHIA Caro CNP     Video-Visit Details  Type of service:  Video Visit  Video End Time (time video stopped): 3:25  Distant Location (provider location):  [facility]           Sincerely,        TASHIA Caro CNP

## 2020-04-01 NOTE — PROGRESS NOTES
"[facility] E-VISIT   Litzy Kenny is being evaluated via a billable video visit due to the restrictions of the Covid-19 pandemic.   The patient has been notified of following: \"This video visit will be conducted via a call between you and your provider. We have found that certain health care needs can be provided without the need for an in-person physical exam.  This service lets us provide the care you need with a video conversation. If during the course of the call the provider feels a video visit is not appropriate, you will not be charged for this service.\"   The provider has received verbal consent for a Video Visit from the patient or first contact? Yes  Video Start Time: 3:12  [facility] Medical Record Number:  [id number]  Place of Location at the time of visit: [facility]  Chief Complaint   Patient presents with     Nursing Home Acute     HPI:  Litzy Kenny  is a 87 year old (4/10/1932), who is being seen today for an E-visit.  HPI information obtained from: facility chart records, facility staff, patient report and [facility] chart review.    Ms. Kenny was visited virtually today with staff RN present. She reports that she has had increasing left thigh pain which she believes was from standing too long while eating dinner last week. She does not believe that this is originating from her back. Cannot fully describe the pain given cognitive impairment but states it is in her thigh and asks if she can have a lidoderm patch to put in place. She has been sleeping well. Does not have any other concerns at this time.     Past Medical and Surgical History reviewed in Epic today.  MEDICATIONS:  Current Outpatient Medications   Medication Sig Dispense Refill     ACETAMINOPHEN EXTRA STRENGTH 500 MG tablet TAKE TWO TABLETS (1000MG) BY MOUTH THREE TIMES DAILY 60 tablet PRN     calcitonin, salmon, (MIACALCIN) 200 UNIT/ACT nasal spray Spray 1 spray into one nostril alternating nostrils " "daily Alternate nostril each day. 1 Bottle 3     calcium carbonate 600 mg-vitamin D 400 units (CALTRATE) 600-400 MG-UNIT per tablet TAKE 1 TABLET BY MOUTH TWICE DAILY 60 tablet PRN     carvedilol (COREG) 6.25 MG tablet TAKE 1 TABLET BY MOUTH TWICE DAILY 56 tablet PRN     Cetaphil Moisturizing (CETAPHIL) external lotion Apply topically 2 times daily       HYDROmorphone (DILAUDID) 2 MG tablet TAKE 1 TABLET BY MOUTH THREE TIMES DAILY 60 tablet 0     Lidocaine (LIDOCARE) 4 % Patch Place 1 patch onto the skin every 24 hours To prevent lidocaine toxicity, patient should be patch free for 12 hrs daily.       MYRBETRIQ 50 MG 24 hr tablet TAKE 1 TABLET BY MOUTH ONCE DAILY 30 tablet PRN     senna-docusate (SENOKOT-S/PERICOLACE) 8.6-50 MG tablet Take 1 tablet by mouth daily as needed for constipation       VITAMIN D3 25 MCG (1000 UT) tablet TAKE TWO TABLETS (2000 UNITS) BY MOUTH ONCE DAILY 60 tablet PRN     REVIEW OF SYSTEMS: 4 point ROS including Respiratory, CV, GI and , other than that noted in the HPI,  is negative  Objective: BP (!) 141/66   Pulse 98   Temp 97  F (36.1  C)   Resp 20   Ht 1.499 m (4' 11\")   Wt 40.8 kg (90 lb)   BMI 18.18 kg/m     E-visit exam done given COVID-19 precautions.   GENERAL APPEARANCE:  Alert, in no distress, pleasant, cooperative  EYES: no discharge or mattering on lids or lashes noted  ENT:  moist mucous membranes, hearing acuity intact  NECK: supple, symmetrical  RESP: no respiratory distress, patient is on room air  M/S:   Gait and station: ambulates with walker, kyphosis, able to move all extremities  NEURO: no facial asymmetry, no speech deficits and able to follow directions, moves all extremities symmetrically  PSYCH:  insight, judgement, and memory impaired affect and mood normal    Labs:     Last Basic Metabolic Panel:  Recent Labs   Lab Test 03/05/20          ASSESSMENT/PLAN:  Left thigh pain  Chronic back pain  Has had increasing thigh pain which she does not recall " trauma so presumably sciatica from her compression fractures and spinal stenosis. She asks for a lidocaine patch to be placed on the left thigh since that helps with her back pain. She is currently on dilaudid three times daily which is not ideal and would like to get her off that in the future. She has tried gabapentin in the past but it may be worth trialing again if therapy does not improve the pain  --tylenol 1000 mg TID  --dilaudid 2 mg TID  --PT for strengthening  --lidocaine 4% patch to back and left thigh daily and off at HS.       Electronically signed by:  TASHIA Caro CNP     Video-Visit Details  Type of service:  Video Visit  Video End Time (time video stopped): 3:25  Distant Location (provider location):  [place]

## 2020-04-02 DIAGNOSIS — S32.010D COMPRESSION FRACTURE OF L1 VERTEBRA WITH ROUTINE HEALING, SUBSEQUENT ENCOUNTER: ICD-10-CM

## 2020-04-02 RX ORDER — HYDROMORPHONE HYDROCHLORIDE 2 MG/1
TABLET ORAL
Qty: 90 TABLET | Refills: 0 | Status: SHIPPED | OUTPATIENT
Start: 2020-04-02 | End: 2020-05-04

## 2020-04-03 DIAGNOSIS — S32.010D COMPRESSION FRACTURE OF L1 VERTEBRA WITH ROUTINE HEALING, SUBSEQUENT ENCOUNTER: Primary | ICD-10-CM

## 2020-04-06 RX ORDER — ACETAMINOPHEN 160 MG
TABLET,DISINTEGRATING ORAL
Qty: 56 CAPSULE | Status: SHIPPED | OUTPATIENT
Start: 2020-04-06 | End: 2021-04-03

## 2020-04-08 DIAGNOSIS — M79.652 PAIN OF LEFT THIGH: Primary | ICD-10-CM

## 2020-04-09 RX ORDER — LIDOCAINE 246 MG/1
PATCH TOPICAL
Qty: 30 PATCH | Status: SHIPPED | OUTPATIENT
Start: 2020-04-09 | End: 2021-05-01

## 2020-05-02 DIAGNOSIS — S32.010D COMPRESSION FRACTURE OF L1 VERTEBRA WITH ROUTINE HEALING, SUBSEQUENT ENCOUNTER: ICD-10-CM

## 2020-05-04 DIAGNOSIS — S32.010D COMPRESSION FRACTURE OF L1 VERTEBRA WITH ROUTINE HEALING, SUBSEQUENT ENCOUNTER: ICD-10-CM

## 2020-05-04 RX ORDER — HYDROMORPHONE HYDROCHLORIDE 2 MG/1
TABLET ORAL
Qty: 90 TABLET | Refills: 0 | Status: SHIPPED | OUTPATIENT
Start: 2020-05-04 | End: 2020-09-29

## 2020-05-07 RX ORDER — HYDROMORPHONE HYDROCHLORIDE 2 MG/1
TABLET ORAL
Qty: 90 TABLET | Refills: 0 | Status: SHIPPED | OUTPATIENT
Start: 2020-05-07 | End: 2020-06-25

## 2020-08-11 ENCOUNTER — ASSISTED LIVING VISIT (OUTPATIENT)
Dept: GERIATRICS | Facility: CLINIC | Age: 85
End: 2020-08-11
Payer: MEDICARE

## 2020-08-11 VITALS
HEIGHT: 59 IN | OXYGEN SATURATION: 96 % | TEMPERATURE: 96.8 F | DIASTOLIC BLOOD PRESSURE: 66 MMHG | HEART RATE: 98 BPM | RESPIRATION RATE: 20 BRPM | SYSTOLIC BLOOD PRESSURE: 141 MMHG | WEIGHT: 90 LBS | BODY MASS INDEX: 18.14 KG/M2

## 2020-08-11 DIAGNOSIS — M54.50 CHRONIC BILATERAL LOW BACK PAIN WITHOUT SCIATICA: ICD-10-CM

## 2020-08-11 DIAGNOSIS — G89.29 CHRONIC BILATERAL LOW BACK PAIN WITHOUT SCIATICA: ICD-10-CM

## 2020-08-11 DIAGNOSIS — G30.9 DEMENTIA IN ALZHEIMER'S DISEASE (H): ICD-10-CM

## 2020-08-11 DIAGNOSIS — F02.80 DEMENTIA IN ALZHEIMER'S DISEASE (H): ICD-10-CM

## 2020-08-11 DIAGNOSIS — I48.0 PAROXYSMAL ATRIAL FIBRILLATION (H): Primary | ICD-10-CM

## 2020-08-11 DIAGNOSIS — N32.81 OVERACTIVE BLADDER: ICD-10-CM

## 2020-08-11 DIAGNOSIS — M48.061 SPINAL STENOSIS OF LUMBAR REGION WITHOUT NEUROGENIC CLAUDICATION: ICD-10-CM

## 2020-08-11 NOTE — PROGRESS NOTES
"[facility]  [place] Medical Record Number: [id number]  Place of Service where encounter took place: [facility] ([facility]) [[id number]]  Chief Complaint   Patient presents with     RECHECK       HPI:    Litzy Kenny is a 87 year old (4/10/1932), who is being seen today for an episodic care visit. HPI information obtained from: facility chart records, facility staff and patient report     Ms. Kenny was visited today while in her room, walking around with her walker. She states that the urinary frequency has become worse. She reports being so busy throughout the day, helping Shan with his daytime cares as well as nighttime cares. Dementia is progressive so unable to provide further history.     Spouse Shan said, \"she is not doing well.\" states that she has not been sleeping well, generally is awake until 2 a.m and wakes early. She has not been eating well. He also notes that she has problems with the overactive bladder. Spoke with them about starting an antidepressant for irritability but both declined at this time.     Spoke with daughterKat for 19 minutes this afternoon. She says Litzy has not slept well for years and generally would stay up until 2-3 a.m. she is unsure if she naps during the day. Mentioned that Litzy has a broken tooth and the need for an oral evaluation but she agreed that Litzy would not want to go out of the apartment.      Past Medical and Surgical History reviewed in Epic today.    MEDICATIONS:  Current Outpatient Medications   Medication Sig Dispense Refill     ACETAMINOPHEN EXTRA STRENGTH 500 MG tablet TAKE TWO TABLETS (1000MG) BY MOUTH THREE TIMES DAILY 60 tablet PRN     ASPERCREME LIDOCAINE 4 % Patch APPLY 1 PATCH TOPICALLY TO LEFT THIGH (ON IN THE MORNING, OFF AT NIGHT) 30 patch PRN     calcitonin, salmon, (MIACALCIN) 200 UNIT/ACT nasal spray Spray 1 spray into one nostril alternating nostrils daily Alternate nostril each day. 1 Bottle 3     calcium carbonate " "600 mg-vitamin D 400 units (CALTRATE) 600-400 MG-UNIT per tablet TAKE 1 TABLET BY MOUTH TWICE DAILY 60 tablet PRN     carvedilol (COREG) 6.25 MG tablet TAKE 1 TABLET BY MOUTH TWICE DAILY 56 tablet PRN     Cetaphil Moisturizing (CETAPHIL) external lotion Apply topically 2 times daily       Cholecalciferol (VITAMIN D3) 50 MCG (2000 UT) CAPS TAKE TWO CAPSULES (100MCG / 4000 UNITS) BY MOUTH ONCE DAILY 56 capsule PRN     HYDROmorphone (DILAUDID) 2 MG tablet TAKE 1 TABLET BY MOUTH THREE TIMES DAILY 90 tablet 0     HYDROmorphone (DILAUDID) 2 MG tablet TAKE 1 TABLET BY MOUTH THREE TIMES DAILY 90 tablet 0     MYRBETRIQ 50 MG 24 hr tablet TAKE 1 TABLET BY MOUTH ONCE DAILY 30 tablet PRN     senna-docusate (SENOKOT-S/PERICOLACE) 8.6-50 MG tablet Take 1 tablet by mouth daily as needed for constipation       VITAMIN D3 25 MCG (1000 UT) tablet TAKE TWO TABLETS (2000 UNITS) BY MOUTH ONCE DAILY 60 tablet PRN       REVIEW OF SYSTEMS:  4 point ROS including Respiratory, CV, GI and , other than that noted in the HPI,  is negative    Objective:  BP (!) 141/66   Pulse 98   Temp 96.8  F (36  C)   Resp 20   Ht 1.499 m (4' 11\")   Wt 40.8 kg (90 lb)   SpO2 96%   BMI 18.18 kg/m    Exam:  GENERAL APPEARANCE:  Alert, in no distress, pleasant, cooperative  RESP: no respiratory distress, patient is on room air  CV:  Edema trace in bilateral lower extremities.   M/S:   Gait and station: ambulates with walker, no tenderness or swelling of the joints; able to move all extremities,severe kyphosis  NEURO: no facial asymmetry, no speech deficits and able to follow directions, moves all extremities symmetrically  PSYCH:  insight, judgement, and memory impaired affect and mood normal/irritable which is baseline    Labs:   Reviewed in care everywhere    ASSESSMENT/PLAN:  (I48.0) Paroxysmal atrial fibrillation (H)  (primary encounter diagnosis)  Comment: No HR available for review today.   Plan:  Continue with carvedilol 6.25 mg BID   --monitor HR " and adjust dose as needed    (M48.061) Spinal stenosis of lumbar region without neurogenic claudication  (M54.5,  G89.29) Chronic bilateral low back pain without sciatica  Comment: many years of chronic pain and opioid dependence      Plan: continue with tylenol 1000 mg TID   --dilaudid 2 mg TID   --aspercreme    (G30.9,  F02.80) Dementia in Alzheimer's disease (H)  Comment: progressive. pt is guarded and resistant to changes. She would benefit from duloxetine for mood and pain but both her and Shan declined. Concerned for her overall status as she does not sleep or eat well. Also has limited insight to her function. High fall risk due to back and not tieing her shoes.     Plan: AL support for meds, meals.  --spoke with daughter about concerns and she too has concerns. Will reach out to facility about getting a care conference set up.     (N32.81) Overactive bladder  Comment: longstanding, still symptomatic despite treatment   Plan: at maximum dose of Myrbetriq.   Has seen Urology and declined other tx  Continue with incontinence products and assist with cares       Electronically signed by:  TASHIA Caro CNP

## 2020-08-11 NOTE — LETTER
"    8/11/2020        RE: Litzy Kenny  C/o Ben Kenny  [address]  [facility] [address]        [facility]  [place] Medical Record Number: [id number]  [facility][id number]  Chief Complaint   Patient presents with     RECHECK       HPI:    Litzy Kenny is a 87 year old (4/10/1932), who is being seen today for an episodic care visit. HPI information obtained from: facility chart records, facility staff and patient report     Ms. Kenny was visited today while in her room, walking around with her walker. She states that the urinary frequency has become worse. She reports being so busy throughout the day, helping Shan with his daytime cares as well as nighttime cares. Dementia is progressive so unable to provide further history.     Spouse Shan said, \"she is not doing well.\" states that she has not been sleeping well, generally is awake until 2 a.m and wakes early. She has not been eating well. He also notes that she has problems with the overactive bladder. Spoke with them about starting an antidepressant for irritability but both declined at this time.     Spoke with daughterKat for 19 minutes this afternoon. She says Litzy has not slept well for years and generally would stay up until 2-3 a.m. she is unsure if she naps during the day. Mentioned that Litzy has a broken tooth and the need for an oral evaluation but she agreed that Litzy would not want to go out of the apartment.      Past Medical and Surgical History reviewed in Epic today.    MEDICATIONS:  Current Outpatient Medications   Medication Sig Dispense Refill     ACETAMINOPHEN EXTRA STRENGTH 500 MG tablet TAKE TWO TABLETS (1000MG) BY MOUTH THREE TIMES DAILY 60 tablet PRN     ASPERCREME LIDOCAINE 4 % Patch APPLY 1 PATCH TOPICALLY TO LEFT THIGH (ON IN THE MORNING, OFF AT NIGHT) 30 patch PRN     calcitonin, salmon, (MIACALCIN) 200 UNIT/ACT nasal spray Spray 1 spray " "into one nostril alternating nostrils daily Alternate nostril each day. 1 Bottle 3     calcium carbonate 600 mg-vitamin D 400 units (CALTRATE) 600-400 MG-UNIT per tablet TAKE 1 TABLET BY MOUTH TWICE DAILY 60 tablet PRN     carvedilol (COREG) 6.25 MG tablet TAKE 1 TABLET BY MOUTH TWICE DAILY 56 tablet PRN     Cetaphil Moisturizing (CETAPHIL) external lotion Apply topically 2 times daily       Cholecalciferol (VITAMIN D3) 50 MCG (2000 UT) CAPS TAKE TWO CAPSULES (100MCG / 4000 UNITS) BY MOUTH ONCE DAILY 56 capsule PRN     HYDROmorphone (DILAUDID) 2 MG tablet TAKE 1 TABLET BY MOUTH THREE TIMES DAILY 90 tablet 0     HYDROmorphone (DILAUDID) 2 MG tablet TAKE 1 TABLET BY MOUTH THREE TIMES DAILY 90 tablet 0     MYRBETRIQ 50 MG 24 hr tablet TAKE 1 TABLET BY MOUTH ONCE DAILY 30 tablet PRN     senna-docusate (SENOKOT-S/PERICOLACE) 8.6-50 MG tablet Take 1 tablet by mouth daily as needed for constipation       VITAMIN D3 25 MCG (1000 UT) tablet TAKE TWO TABLETS (2000 UNITS) BY MOUTH ONCE DAILY 60 tablet PRN       REVIEW OF SYSTEMS:  4 point ROS including Respiratory, CV, GI and , other than that noted in the HPI,  is negative    Objective:  BP (!) 141/66   Pulse 98   Temp 96.8  F (36  C)   Resp 20   Ht 1.499 m (4' 11\")   Wt 40.8 kg (90 lb)   SpO2 96%   BMI 18.18 kg/m    Exam:  GENERAL APPEARANCE:  Alert, in no distress, pleasant, cooperative  RESP: no respiratory distress, patient is on room air  CV:  Edema trace in bilateral lower extremities.   M/S:   Gait and station: ambulates with walker, no tenderness or swelling of the joints; able to move all extremities,severe kyphosis  NEURO: no facial asymmetry, no speech deficits and able to follow directions, moves all extremities symmetrically  PSYCH:  insight, judgement, and memory impaired affect and mood normal/irritable which is baseline    Labs:   Reviewed in care everywhere    ASSESSMENT/PLAN:  (I48.0) Paroxysmal atrial fibrillation (H)  (primary encounter " diagnosis)  Comment: No HR available for review today.   Plan:  Continue with carvedilol 6.25 mg BID   --monitor HR and adjust dose as needed    (M48.061) Spinal stenosis of lumbar region without neurogenic claudication  (M54.5,  G89.29) Chronic bilateral low back pain without sciatica  Comment: many years of chronic pain and opioid dependence      Plan: continue with tylenol 1000 mg TID   --dilaudid 2 mg TID   --aspercreme    (G30.9,  F02.80) Dementia in Alzheimer's disease (H)  Comment: pt is guarded and resistant to changes. She would benefit from duloxetine for mood and pain but both her and Shan declined. Concerned for her overall status as she does not sleep or eat well. Also has limited insight to her function. High fall risk due to back and not tieing her shoes.     Plan: AL support for meds, meals.  --spoke with daughter about concerns and she too has concerns. Will reach out to facility about getting a care conference set up.     (N32.81) Overactive bladder  Comment: longstanding, still symptomatic despite treatment   Plan: at maximum dose of Myrbetriq.   Has seen Urology and declined other tx  Continue with incontinence products and assist with cares       Electronically signed by:  TASHIA Caro CNP             Sincerely,        TASHIA Caro CNP

## 2020-08-12 ENCOUNTER — RECORDS - HEALTHEAST (OUTPATIENT)
Dept: LAB | Facility: CLINIC | Age: 85
End: 2020-08-12

## 2020-08-13 ENCOUNTER — TRANSFERRED RECORDS (OUTPATIENT)
Dept: HEALTH INFORMATION MANAGEMENT | Facility: CLINIC | Age: 85
End: 2020-08-13

## 2020-08-13 LAB
ALBUMIN SERPL-MCNC: 3.9 G/DL (ref 3.5–5)
ALP SERPL-CCNC: 27 U/L (ref 45–120)
ALT SERPL-CCNC: 11 U/L (ref 0–45)
ANION GAP SERPL CALCULATED.3IONS-SCNC: 11 MMOL/L (ref 5–18)
AST SERPL-CCNC: 22 U/L (ref 0–40)
BILIRUB SERPL-MCNC: 0.6 MG/DL (ref 0–1)
BUN SERPL-MCNC: 27 MG/DL (ref 8–28)
CALCIUM SERPL-MCNC: 9.4 MG/DL (ref 8.5–10.5)
CHLORIDE SERPLBLD-SCNC: 101 MMOL/L (ref 98–107)
CO2 SERPL-SCNC: 24 MMOL/L (ref 22–31)
CREAT SERPL-MCNC: 0.77 MG/DL (ref 0.6–1.1)
ERYTHROCYTE [DISTWIDTH] IN BLOOD BY AUTOMATED COUNT: 13.1 % (ref 11–14.5)
GFR SERPL CREATININE-BSD FRML MDRD: >60 ML/MIN/1.73M2
GLUCOSE SERPL-MCNC: 80 MG/DL (ref 70–125)
HCT VFR BLD AUTO: 35.8 % (ref 35–47)
HEMOGLOBIN: 11.5 G/DL (ref 12–16)
MCH RBC QN AUTO: 33 PG (ref 27–34)
MCHC RBC AUTO-ENTMCNC: 32.1 G/DL (ref 32–36)
MCV RBC AUTO: 103 FL (ref 80–100)
PLATELET # BLD AUTO: 314 THOU/UL (ref 140–440)
POTASSIUM SERPL-SCNC: 4.2 MMOL/L (ref 3.5–5)
PROT SERPL-MCNC: 7.4 G/DL (ref 6–8)
RBC # BLD AUTO: 3.49 MILL/UL (ref 3.8–5.4)
SODIUM SERPL-SCNC: 136 MMOL/L (ref 136–145)
WBC # BLD AUTO: 8.3 THOU/UL (ref 4–11)

## 2020-08-14 LAB
ALBUMIN SERPL-MCNC: 3.9 G/DL (ref 3.5–5)
ALP SERPL-CCNC: 27 U/L (ref 45–120)
ALT SERPL W P-5'-P-CCNC: 11 U/L (ref 0–45)
ANION GAP SERPL CALCULATED.3IONS-SCNC: 11 MMOL/L (ref 5–18)
AST SERPL W P-5'-P-CCNC: 22 U/L (ref 0–40)
BILIRUB SERPL-MCNC: 0.6 MG/DL (ref 0–1)
BUN SERPL-MCNC: 27 MG/DL (ref 8–28)
CALCIUM SERPL-MCNC: 9.4 MG/DL (ref 8.5–10.5)
CHLORIDE BLD-SCNC: 101 MMOL/L (ref 98–107)
CO2 SERPL-SCNC: 24 MMOL/L (ref 22–31)
CREAT SERPL-MCNC: 0.77 MG/DL (ref 0.6–1.1)
ERYTHROCYTE [DISTWIDTH] IN BLOOD BY AUTOMATED COUNT: 13.1 % (ref 11–14.5)
GFR SERPL CREATININE-BSD FRML MDRD: >60 ML/MIN/1.73M2
GLUCOSE BLD-MCNC: 80 MG/DL (ref 70–125)
HCT VFR BLD AUTO: 35.8 % (ref 35–47)
HGB BLD-MCNC: 11.5 G/DL (ref 12–16)
MCH RBC QN AUTO: 33 PG (ref 27–34)
MCHC RBC AUTO-ENTMCNC: 32.1 G/DL (ref 32–36)
MCV RBC AUTO: 103 FL (ref 80–100)
PLATELET # BLD AUTO: 314 THOU/UL (ref 140–440)
PMV BLD AUTO: 10.3 FL (ref 8.5–12.5)
POTASSIUM BLD-SCNC: 4.2 MMOL/L (ref 3.5–5)
PROT SERPL-MCNC: 7.4 G/DL (ref 6–8)
RBC # BLD AUTO: 3.49 MILL/UL (ref 3.8–5.4)
SODIUM SERPL-SCNC: 136 MMOL/L (ref 136–145)
WBC: 8.3 THOU/UL (ref 4–11)

## 2020-08-27 DIAGNOSIS — S32.010D COMPRESSION FRACTURE OF L1 VERTEBRA WITH ROUTINE HEALING, SUBSEQUENT ENCOUNTER: ICD-10-CM

## 2020-08-30 RX ORDER — HYDROMORPHONE HYDROCHLORIDE 2 MG/1
TABLET ORAL
Qty: 90 TABLET | Refills: 0 | Status: SHIPPED | OUTPATIENT
Start: 2020-08-30 | End: 2020-09-24

## 2020-09-23 DIAGNOSIS — M81.0 OSTEOPOROSIS WITHOUT CURRENT PATHOLOGICAL FRACTURE, UNSPECIFIED OSTEOPOROSIS TYPE: ICD-10-CM

## 2020-09-23 DIAGNOSIS — S32.010D COMPRESSION FRACTURE OF L1 VERTEBRA WITH ROUTINE HEALING, SUBSEQUENT ENCOUNTER: ICD-10-CM

## 2020-09-24 RX ORDER — HYDROMORPHONE HYDROCHLORIDE 2 MG/1
TABLET ORAL
Qty: 90 TABLET | Refills: 0 | Status: SHIPPED | OUTPATIENT
Start: 2020-09-24 | End: 2020-10-28

## 2020-09-24 RX ORDER — CALCITONIN SALMON 200 [IU]/.09ML
1 SPRAY, METERED NASAL DAILY
Qty: 3.7 ML | Refills: 97 | Status: SHIPPED | OUTPATIENT
Start: 2020-09-24 | End: 2021-11-10

## 2020-09-27 ENCOUNTER — TRANSFERRED RECORDS (OUTPATIENT)
Dept: HEALTH INFORMATION MANAGEMENT | Facility: CLINIC | Age: 85
End: 2020-09-27

## 2020-09-27 ENCOUNTER — TELEPHONE (OUTPATIENT)
Dept: GERIATRICS | Facility: CLINIC | Age: 85
End: 2020-09-27

## 2020-09-27 NOTE — PROGRESS NOTES
Patient with complaints of RLE pain and swelling. Nursing assessed noting 1+ edema and tightness.    ORDERS  STAT Venous US to rule out RLE DVT    Dr Madelyn LAO DNP

## 2020-09-29 ENCOUNTER — ASSISTED LIVING VISIT (OUTPATIENT)
Dept: GERIATRICS | Facility: CLINIC | Age: 85
End: 2020-09-29
Payer: MEDICARE

## 2020-09-29 VITALS
SYSTOLIC BLOOD PRESSURE: 141 MMHG | WEIGHT: 90 LBS | TEMPERATURE: 97.7 F | OXYGEN SATURATION: 95 % | HEART RATE: 98 BPM | DIASTOLIC BLOOD PRESSURE: 66 MMHG | HEIGHT: 59 IN | RESPIRATION RATE: 20 BRPM | BODY MASS INDEX: 18.14 KG/M2

## 2020-09-29 DIAGNOSIS — G30.9 DEMENTIA IN ALZHEIMER'S DISEASE (H): Primary | ICD-10-CM

## 2020-09-29 DIAGNOSIS — F02.80 DEMENTIA IN ALZHEIMER'S DISEASE (H): Primary | ICD-10-CM

## 2020-09-29 DIAGNOSIS — I48.0 PAROXYSMAL ATRIAL FIBRILLATION (H): ICD-10-CM

## 2020-09-29 DIAGNOSIS — R60.0 EDEMA OF RIGHT LOWER EXTREMITY: ICD-10-CM

## 2020-09-29 DIAGNOSIS — E44.0 MODERATE PROTEIN-CALORIE MALNUTRITION (H): ICD-10-CM

## 2020-09-29 DIAGNOSIS — I10 ESSENTIAL HYPERTENSION: ICD-10-CM

## 2020-09-29 PROBLEM — E46 PROTEIN-CALORIE MALNUTRITION (H): Status: ACTIVE | Noted: 2020-09-29

## 2020-09-29 RX ORDER — CARVEDILOL 12.5 MG/1
12.5 TABLET ORAL 2 TIMES DAILY
COMMUNITY
Start: 2020-09-29 | End: 2020-10-20

## 2020-09-29 NOTE — PROGRESS NOTES
[facility]  [place] Medical Record Number: [id number]  [facility] [[id number][facility]  Chief Complaint   Patient presents with     RECHECK       HPI:    Litzy Kenny is a 87 year old (4/10/1932), who is being seen today for an episodic care visit. HPI information obtained from: facility chart records, facility staff and patient report     Ms. Kenny was visited today while in her room, sitting at the counter eating breakfast. Feels tired today and Shan has noted that she has been more tired lately. Denies shortness of breath. Shan states that she hasnt been eating well. Doppler completed this weekend for RLE swelling and was negative for DVT. She denies pain to the leg today and has not noted any open areas.     Past Medical and Surgical History reviewed in Epic today.    MEDICATIONS:  Current Outpatient Medications   Medication Sig Dispense Refill     ACETAMINOPHEN EXTRA STRENGTH 500 MG tablet TAKE TWO TABLETS (1000MG) BY MOUTH THREE TIMES DAILY 60 tablet PRN     ASPERCREME LIDOCAINE 4 % Patch APPLY 1 PATCH TOPICALLY TO LEFT THIGH (ON IN THE MORNING, OFF AT NIGHT) 30 patch PRN     calcitonin, salmon, (MIACALCIN) 200 UNIT/ACT nasal spray SPRAY 1 SPRAY INTO ONE NOSTRIL ALTERNATING NOSTRILS DAILY. ALTERNATE NOSTRIL EACH DAY. 3.7 mL 97     calcium carbonate 600 mg-vitamin D 400 units (CALTRATE) 600-400 MG-UNIT per tablet TAKE 1 TABLET BY MOUTH TWICE DAILY 60 tablet PRN     carvedilol (COREG) 6.25 MG tablet TAKE 1 TABLET BY MOUTH TWICE DAILY 56 tablet PRN     Cetaphil Moisturizing (CETAPHIL) external lotion Apply topically 2 times daily       Cholecalciferol (VITAMIN D3) 50 MCG (2000 UT) CAPS TAKE TWO CAPSULES (100MCG / 4000 UNITS) BY MOUTH ONCE DAILY 56 capsule PRN     HYDROmorphone (DILAUDID) 2 MG tablet TAKE 1 TABLET BY MOUTH THREE TIMES DAILY 90 tablet 0     HYDROmorphone (DILAUDID) 2 MG tablet TAKE 1 TABLET BY MOUTH THREE TIMES DAILY 90 tablet 0  "    MYRBETRIQ 50 MG 24 hr tablet TAKE 1 TABLET BY MOUTH ONCE DAILY 30 tablet PRN     senna-docusate (SENOKOT-S/PERICOLACE) 8.6-50 MG tablet Take 1 tablet by mouth daily as needed for constipation       VITAMIN D3 25 MCG (1000 UT) tablet TAKE TWO TABLETS (2000 UNITS) BY MOUTH ONCE DAILY 60 tablet PRN       REVIEW OF SYSTEMS:  Limited due to cognitive impairment    Objective:  BP (!) 141/66   Pulse 98   Temp 97.7  F (36.5  C)   Resp 20   Ht 1.499 m (4' 11\")   Wt 40.8 kg (90 lb)   SpO2 95%   BMI 18.18 kg/m    Exam:  GENERAL APPEARANCE:  Alert, no distress, dirty clothing on. Tired.   RESP: no respiratory distress, lungs are clear patient is on room air  CV:  Edema 2+ in right lower extremity. Chronic hilaria appearance.    M/S:   Gait and station: ambulates with walker, no tenderness or swelling of the joints; able to move all extremities,severe kyphosis  NEURO: no facial asymmetry, no speech deficits and able to follow directions, moves all extremities symmetrically  PSYCH:  insight, judgement, and memory impaired affect and mood normal/irritable which is baseline    Labs:   CBC RESULTS:   Recent Labs   Lab Test 08/13/20   WBC 8.3   RBC 3.49*   HGB 11.5*   HCT 35.8   *   MCH 33.0   MCHC 32.1   RDW 13.1          Last Basic Metabolic Panel:  Recent Labs   Lab Test 08/13/20 03/05/20    137   POTASSIUM 4.2  --    CHLORIDE 101  --    KEYANNA 9.4  --    CO2 24  --    BUN 27  --    CR 0.77  --    GLC 80  --        Liver Function Studies -   Recent Labs   Lab Test 08/13/20   PROTTOTAL 7.4   ALBUMIN 3.9   BILITOTAL 0.6   ALKPHOS 27*   AST 22   ALT 11         ASSESSMENT/PLAN:  (R60.0) Edema of right lower extremity  Comment: had warmth over the weekend which is not present today but evident unilateral swelling. Had doppler which was negative for DVT.   Plan: [facility] RN to eval and treat     (I48.0) Paroxysmal atrial fibrillation (H)   (I10) essential HTN  Comment: HR 83 and BP elevated at 158/88 today  Plan: "   --Increase carvedilol to 12.5 mg BID   --Continue to monitor blood pressure and heart rate, adjust medications as needed.    (G30.9,  F02.80) Dementia in Alzheimer's disease (H)  Comment: progressive. Concerned for her overall status as she does not sleep or eat well and appears more weak today. Also has limited insight to her function. High fall risk due to back pain and not tieing her shoes. Question if she needs to be in memory care.   Plan:   --OT for cognitive assessment.   --AL support for meds, meals.    (E44.0) Moderate protein-calorie malnutrition  Comment: Body mass index is 18.18 kg/m . due to progression of dementia.   Plan: facility provides three meals per day  --offer ensure once daily    Electronically signed by:  TASHIA Caro CNP

## 2020-09-29 NOTE — LETTER
9/29/2020        RE: Litzy Kenny  C/o Ben Kenny  [address]  [facility] [address]        [facility]  [place] Medical Record Number: [id number]  [facility] [[id number][facility]  Chief Complaint   Patient presents with     RECHECK       HPI:    Litzy Kenny is a 87 year old (4/10/1932), who is being seen today for an episodic care visit. HPI information obtained from: facility chart records, facility staff and patient report     Ms. Kenny was visited today while in her room, sitting at the counter eating breakfast. Feels tired today and Shan has noted that she has been more tired lately. Denies shortness of breath. Shan states that she hasnt been eating well. Doppler completed this weekend for RLE swelling and was negative for DVT. She denies pain to the leg today and has not noted any open areas.     Past Medical and Surgical History reviewed in Epic today.    MEDICATIONS:  Current Outpatient Medications   Medication Sig Dispense Refill     ACETAMINOPHEN EXTRA STRENGTH 500 MG tablet TAKE TWO TABLETS (1000MG) BY MOUTH THREE TIMES DAILY 60 tablet PRN     ASPERCREME LIDOCAINE 4 % Patch APPLY 1 PATCH TOPICALLY TO LEFT THIGH (ON IN THE MORNING, OFF AT NIGHT) 30 patch PRN     calcitonin, salmon, (MIACALCIN) 200 UNIT/ACT nasal spray SPRAY 1 SPRAY INTO ONE NOSTRIL ALTERNATING NOSTRILS DAILY. ALTERNATE NOSTRIL EACH DAY. 3.7 mL 97     calcium carbonate 600 mg-vitamin D 400 units (CALTRATE) 600-400 MG-UNIT per tablet TAKE 1 TABLET BY MOUTH TWICE DAILY 60 tablet PRN     carvedilol (COREG) 6.25 MG tablet TAKE 1 TABLET BY MOUTH TWICE DAILY 56 tablet PRN     Cetaphil Moisturizing (CETAPHIL) external lotion Apply topically 2 times daily       Cholecalciferol (VITAMIN D3) 50 MCG (2000 UT) CAPS TAKE TWO CAPSULES (100MCG / 4000 UNITS) BY MOUTH ONCE DAILY 56 capsule PRN     HYDROmorphone (DILAUDID) 2 MG tablet TAKE 1 TABLET BY MOUTH THREE TIMES DAILY 90  "tablet 0     HYDROmorphone (DILAUDID) 2 MG tablet TAKE 1 TABLET BY MOUTH THREE TIMES DAILY 90 tablet 0     MYRBETRIQ 50 MG 24 hr tablet TAKE 1 TABLET BY MOUTH ONCE DAILY 30 tablet PRN     senna-docusate (SENOKOT-S/PERICOLACE) 8.6-50 MG tablet Take 1 tablet by mouth daily as needed for constipation       VITAMIN D3 25 MCG (1000 UT) tablet TAKE TWO TABLETS (2000 UNITS) BY MOUTH ONCE DAILY 60 tablet PRN       REVIEW OF SYSTEMS:  Limited due to cognitive impairment    Objective:  BP (!) 141/66   Pulse 98   Temp 97.7  F (36.5  C)   Resp 20   Ht 1.499 m (4' 11\")   Wt 40.8 kg (90 lb)   SpO2 95%   BMI 18.18 kg/m    Exam:  GENERAL APPEARANCE:  Alert, no distress, dirty clothing on. Tired.   RESP: no respiratory distress, lungs are clear patient is on room air  CV:  Edema 2+ in right lower extremity. Chronic hilaria appearance.    M/S:   Gait and station: ambulates with walker, no tenderness or swelling of the joints; able to move all extremities,severe kyphosis  NEURO: no facial asymmetry, no speech deficits and able to follow directions, moves all extremities symmetrically  PSYCH:  insight, judgement, and memory impaired affect and mood normal/irritable which is baseline    Labs:   CBC RESULTS:   Recent Labs   Lab Test 08/13/20   WBC 8.3   RBC 3.49*   HGB 11.5*   HCT 35.8   *   MCH 33.0   MCHC 32.1   RDW 13.1          Last Basic Metabolic Panel:  Recent Labs   Lab Test 08/13/20 03/05/20    137   POTASSIUM 4.2  --    CHLORIDE 101  --    KEYANNA 9.4  --    CO2 24  --    BUN 27  --    CR 0.77  --    GLC 80  --        Liver Function Studies -   Recent Labs   Lab Test 08/13/20   PROTTOTAL 7.4   ALBUMIN 3.9   BILITOTAL 0.6   ALKPHOS 27*   AST 22   ALT 11         ASSESSMENT/PLAN:  (R60.0) Edema of right lower extremity  Comment: had warmth over the weekend which is not present today but evident unilateral swelling. Had doppler which was negative for DVT.   Plan: [facility] RN to eval and treat     (I48.0) Paroxysmal " atrial fibrillation (H)   (I10) essential HTN  Comment: HR 83 and BP elevated at 158/88 today  Plan:   --Increase carvedilol to 12.5 mg BID   --Continue to monitor blood pressure and heart rate, adjust medications as needed.    (G30.9,  F02.80) Dementia in Alzheimer's disease (H)  Comment: progressive. Concerned for her overall status as she does not sleep or eat well and appears more weak today. Also has limited insight to her function. High fall risk due to back pain and not tieing her shoes. Question if she needs to be in memory care.   Plan:   --OT for cognitive assessment.   --AL support for meds, meals.    (E44.0) Moderate protein-calorie malnutrition  Comment: Body mass index is 18.18 kg/m . due to progression of dementia.   Plan: facility provides three meals per day  --offer ensure once daily    Electronically signed by:  TASHIA Caro CNP             Sincerely,        TASHIA Caro CNP

## 2020-10-01 ENCOUNTER — DOCUMENTATION ONLY (OUTPATIENT)
Dept: OTHER | Facility: CLINIC | Age: 85
End: 2020-10-01

## 2020-10-01 ENCOUNTER — AMBULATORY - HEALTHEAST (OUTPATIENT)
Dept: OTHER | Facility: CLINIC | Age: 85
End: 2020-10-01

## 2020-10-19 DIAGNOSIS — I48.0 PAROXYSMAL ATRIAL FIBRILLATION (H): ICD-10-CM

## 2020-10-20 RX ORDER — CARVEDILOL 12.5 MG/1
TABLET ORAL
Qty: 56 TABLET | Refills: 97 | Status: SHIPPED | OUTPATIENT
Start: 2020-10-20 | End: 2021-10-17

## 2020-11-03 ENCOUNTER — ASSISTED LIVING VISIT (OUTPATIENT)
Dept: GERIATRICS | Facility: CLINIC | Age: 85
End: 2020-11-03
Payer: MEDICARE

## 2020-11-03 VITALS
DIASTOLIC BLOOD PRESSURE: 60 MMHG | SYSTOLIC BLOOD PRESSURE: 113 MMHG | HEIGHT: 59 IN | OXYGEN SATURATION: 95 % | BODY MASS INDEX: 18.14 KG/M2 | HEART RATE: 89 BPM | TEMPERATURE: 97.3 F | RESPIRATION RATE: 16 BRPM | WEIGHT: 90 LBS

## 2020-11-03 DIAGNOSIS — L03.116 CELLULITIS OF LEFT LOWER EXTREMITY: Primary | ICD-10-CM

## 2020-11-03 DIAGNOSIS — G30.1 LATE ONSET ALZHEIMER'S DISEASE WITHOUT BEHAVIORAL DISTURBANCE (H): ICD-10-CM

## 2020-11-03 DIAGNOSIS — F02.80 LATE ONSET ALZHEIMER'S DISEASE WITHOUT BEHAVIORAL DISTURBANCE (H): ICD-10-CM

## 2020-11-03 DIAGNOSIS — L89.151 PRESSURE ULCER OF COCCYGEAL REGION, STAGE 1: ICD-10-CM

## 2020-11-03 DIAGNOSIS — R60.0 LOWER EXTREMITY EDEMA: ICD-10-CM

## 2020-11-03 ASSESSMENT — MIFFLIN-ST. JEOR: SCORE: 743.87

## 2020-11-03 NOTE — PROGRESS NOTES
[place] [facility]  [place] Medical Record Number: [id number]  [id number]  Chief Complaint   Patient presents with     RECHECK       HPI:    Litzy Kenny is a 88 year old (4/10/1932), who is being seen today for an episodic care visit. HPI information obtained from: facility chart records, facility staff, patient report and [facility] chart review.     Litzy was visited today due to increasing edema and new cellulitis on the left lower extremity. Both yamil and Litzy note today that she is having a lot of drainage from the left foot. She denies pain or discomfort. She feels more weak  (has declined recommended PT/OT) and often sits in the same position on the couch. She can get up to use the restroom but returns to the couch and sleeps sitting up on the couch. She has a hospital bed but has not slept in that bed since she moved into the [place].     Past Medical and Surgical History reviewed in Epic today.    MEDICATIONS:  Current Outpatient Medications   Medication Sig Dispense Refill     cephALEXin (KEFLEX) 500 MG capsule Take 1 capsule (500 mg) by mouth 3 times daily       ACETAMINOPHEN EXTRA STRENGTH 500 MG tablet TAKE TWO TABLETS (1000MG) BY MOUTH THREE TIMES DAILY 60 tablet PRN     ASPERCREME LIDOCAINE 4 % Patch APPLY 1 PATCH TOPICALLY TO LEFT THIGH (ON IN THE MORNING, OFF AT NIGHT) 30 patch PRN     calcitonin, salmon, (MIACALCIN) 200 UNIT/ACT nasal spray SPRAY 1 SPRAY INTO ONE NOSTRIL ALTERNATING NOSTRILS DAILY. ALTERNATE NOSTRIL EACH DAY. 3.7 mL 97     calcium carbonate 600 mg-vitamin D 400 units (CALTRATE) 600-400 MG-UNIT per tablet TAKE 1 TABLET BY MOUTH TWICE DAILY 60 tablet PRN     carvedilol (COREG) 12.5 MG tablet TAKE 1 TABLET BY MOUTH TWICE DAILY 56 tablet 97     Cetaphil Moisturizing (CETAPHIL) external lotion Apply topically 2 times daily       Cholecalciferol (VITAMIN D3) 50 MCG (2000 UT) CAPS TAKE TWO CAPSULES  "(100MCG / 4000 UNITS) BY MOUTH ONCE DAILY 56 capsule PRN     HYDROmorphone (DILAUDID) 2 MG tablet TAKE 1 TABLET BY MOUTH THREE TIMES DAILY 90 tablet 0     MYRBETRIQ 50 MG 24 hr tablet TAKE 1 TABLET BY MOUTH ONCE DAILY 30 tablet PRN     senna-docusate (SENOKOT-S/PERICOLACE) 8.6-50 MG tablet Take 1 tablet by mouth daily as needed for constipation       VITAMIN D3 25 MCG (1000 UT) tablet TAKE TWO TABLETS (2000 UNITS) BY MOUTH ONCE DAILY 60 tablet PRN     REVIEW OF SYSTEMS:  Limited secondary to cognitive impairment    Objective:  /60   Pulse 89   Temp 97.3  F (36.3  C)   Resp 16   Ht 1.499 m (4' 11\")   Wt 40.8 kg (90 lb)   SpO2 95%   BMI 18.18 kg/m    Exam:  GENERAL APPEARANCE:  Alert, no distress, dirty clothing on.   RESP: no respiratory distress, patient is on room air  CV:  Edema 2+ in bilateral lower extremities. Chronic hilaria appearance. clear fluid oozing from left foot.    M/S:   Gait and station: ambulates with walker but walking far less than baseline, no tenderness or swelling of the joints; able to move all extremities,severe kyphosis  NEURO: no facial asymmetry, no speech deficits and able to follow directions, moves all extremities symmetrically  PSYCH:  insight, judgement, and memory impaired affect calm     Labs:   CBC RESULTS:   Recent Labs   Lab Test 08/13/20   WBC 8.3   RBC 3.49*   HGB 11.5*   HCT 35.8   *   MCH 33.0   MCHC 32.1   RDW 13.1          Last Basic Metabolic Panel:  Recent Labs   Lab Test 08/13/20 03/05/20    137   POTASSIUM 4.2  --    CHLORIDE 101  --    KEYANNA 9.4  --    CO2 24  --    BUN 27  --    CR 0.77  --    GLC 80  --        Liver Function Studies -   Recent Labs   Lab Test 08/13/20   PROTTOTAL 7.4   ALBUMIN 3.9   BILITOTAL 0.6   ALKPHOS 27*   AST 22   ALT 11     ASSESSMENT/PLAN:  (L03.116) Cellulitis of left lower extremity  (primary encounter diagnosis)  (R60.0) Lower extremity edema  Comment: has three opened areas on the LLE with moderate amount of " weeping from the left foot. She is noncompliant with elevating legs and sleeps with on the couch with legs in the dependent positioning so having small progress with decreasing the fluid in the bilateral lower extremities.   Plan: continue with lymph therapy  --cephalexin 500 mg TID x 5 days  -- RN and WO nurse to follow    (L89.151) Pressure ulcer of coccygeal region, stage 1  Comment: due to spending more time in the sitting position.   Plan: barrier cream BID to coccyx area. Encourage frequent movement throughout the day.   --encourage sleeping in her hospital bed.     (G30.1,  F02.80) Late onset Alzheimer's disease without behavioral disturbance (H)  Comment: progressing. Ambulating less and appears frail. Believe that she would benefit from memory care for a more cares but this has been declined in the past. Would like to put together a care conference to connect with family.   Plan: will reach out to [facility] for care conference.   --recommend memory care  --continue with assisted living for assistance with cares, meals and medications.     Electronically signed by:  TASHIA Caro CNP

## 2020-11-03 NOTE — LETTER
11/3/2020        RE: Litzy Kenny  C/o Ben Kenny  [address] [facility] [address]        [place] [facility]  [place] Medical Record Number: [id number]  [facility] of Service where encounter took place[facility] [[id number][facility]  Chief Complaint   Patient presents with     RECHECK       HPI:    Litzy Kenny is a 88 year old (4/10/1932), who is being seen today for an episodic care visit. HPI information obtained from: facility chart records, facility staff, patient report and [facility] chart review.     Litzy was visited today due to increasing edema and new cellulitis on the left lower extremity. Both yamil and Litzy note today that she is having a lot of drainage from the left foot. She denies pain or discomfort. She feels more weak  (has declined recommended PT/OT) and often sits in the same position on the couch. She can get up to use the restroom but returns to the couch and sleeps sitting up on the couch. She has a hospital bed but has not slept in that bed since she moved into the AL.     Past Medical and Surgical History reviewed in Epic today.    MEDICATIONS:  Current Outpatient Medications   Medication Sig Dispense Refill     cephALEXin (KEFLEX) 500 MG capsule Take 1 capsule (500 mg) by mouth 3 times daily       ACETAMINOPHEN EXTRA STRENGTH 500 MG tablet TAKE TWO TABLETS (1000MG) BY MOUTH THREE TIMES DAILY 60 tablet PRN     ASPERCREME LIDOCAINE 4 % Patch APPLY 1 PATCH TOPICALLY TO LEFT THIGH (ON IN THE MORNING, OFF AT NIGHT) 30 patch PRN     calcitonin, salmon, (MIACALCIN) 200 UNIT/ACT nasal spray SPRAY 1 SPRAY INTO ONE NOSTRIL ALTERNATING NOSTRILS DAILY. ALTERNATE NOSTRIL EACH DAY. 3.7 mL 97     calcium carbonate 600 mg-vitamin D 400 units (CALTRATE) 600-400 MG-UNIT per tablet TAKE 1 TABLET BY MOUTH TWICE DAILY 60 tablet PRN     carvedilol (COREG) 12.5 MG tablet TAKE 1 TABLET BY MOUTH TWICE DAILY 56 tablet 97     Cetaphil Moisturizing (CETAPHIL) external lotion  "Apply topically 2 times daily       Cholecalciferol (VITAMIN D3) 50 MCG (2000 UT) CAPS TAKE TWO CAPSULES (100MCG / 4000 UNITS) BY MOUTH ONCE DAILY 56 capsule PRN     HYDROmorphone (DILAUDID) 2 MG tablet TAKE 1 TABLET BY MOUTH THREE TIMES DAILY 90 tablet 0     MYRBETRIQ 50 MG 24 hr tablet TAKE 1 TABLET BY MOUTH ONCE DAILY 30 tablet PRN     senna-docusate (SENOKOT-S/PERICOLACE) 8.6-50 MG tablet Take 1 tablet by mouth daily as needed for constipation       VITAMIN D3 25 MCG (1000 UT) tablet TAKE TWO TABLETS (2000 UNITS) BY MOUTH ONCE DAILY 60 tablet PRN     REVIEW OF SYSTEMS:  Limited secondary to cognitive impairment    Objective:  /60   Pulse 89   Temp 97.3  F (36.3  C)   Resp 16   Ht 1.499 m (4' 11\")   Wt 40.8 kg (90 lb)   SpO2 95%   BMI 18.18 kg/m    Exam:  GENERAL APPEARANCE:  Alert, no distress, dirty clothing on.   RESP: no respiratory distress, patient is on room air  CV:  Edema 2+ in bilateral lower extremities. Chronic hilaria appearance. clear fluid oozing from left foot.    M/S:   Gait and station: ambulates with walker but walking far less than baseline, no tenderness or swelling of the joints; able to move all extremities,severe kyphosis  NEURO: no facial asymmetry, no speech deficits and able to follow directions, moves all extremities symmetrically  PSYCH:  insight, judgement, and memory impaired affect calm     Labs:   CBC RESULTS:   Recent Labs   Lab Test 08/13/20   WBC 8.3   RBC 3.49*   HGB 11.5*   HCT 35.8   *   MCH 33.0   MCHC 32.1   RDW 13.1          Last Basic Metabolic Panel:  Recent Labs   Lab Test 08/13/20 03/05/20    137   POTASSIUM 4.2  --    CHLORIDE 101  --    KEYANNA 9.4  --    CO2 24  --    BUN 27  --    CR 0.77  --    GLC 80  --        Liver Function Studies -   Recent Labs   Lab Test 08/13/20   PROTTOTAL 7.4   ALBUMIN 3.9   BILITOTAL 0.6   ALKPHOS 27*   AST 22   ALT 11     ASSESSMENT/PLAN:  (L03.116) Cellulitis of left lower extremity  (primary encounter " diagnosis)  (R60.0) Lower extremity edema  Comment: has three opened areas on the LLE with moderate amount of weeping from the left foot. She is noncompliant with elevating legs and sleeps with on the couch with legs in the dependent positioning so having small progress with decreasing the fluid in the bilateral lower extremities.   Plan: continue with lymph therapy  --cephalexin 500 mg TID x 5 days  -- RN and WOC nurse to follow    (L89.151) Pressure ulcer of coccygeal region, stage 1  Comment: due to spending more time in the sitting position.   Plan: barrier cream BID to coccyx area. Encourage frequent movement throughout the day.   --encourage sleeping in her hospital bed.     (G30.1,  F02.80) Late onset Alzheimer's disease without behavioral disturbance (H)  Comment: progressing. Ambulating less and appears frail. Believe that she would benefit from memory care for a more cares but this has been declined in the past. Would like to put together a care conference to connect with family.   Plan: will reach out to [facility] for care conference.   --recommend memory care  --continue with assisted living for assistance with cares, meals and medications.     Electronically signed by:  TASHIA Caro CNP               Sincerely,        TASHIA Caro CNP

## 2020-11-04 RX ORDER — CEPHALEXIN 500 MG/1
500 CAPSULE ORAL 3 TIMES DAILY
COMMUNITY
Start: 2020-11-04 | End: 2020-11-07

## 2020-11-13 ENCOUNTER — VIRTUAL VISIT (OUTPATIENT)
Dept: GERIATRICS | Facility: CLINIC | Age: 85
End: 2020-11-13
Payer: MEDICARE

## 2020-11-13 VITALS
BODY MASS INDEX: 18.14 KG/M2 | HEIGHT: 59 IN | SYSTOLIC BLOOD PRESSURE: 113 MMHG | TEMPERATURE: 97.3 F | HEART RATE: 89 BPM | OXYGEN SATURATION: 93 % | WEIGHT: 90 LBS | DIASTOLIC BLOOD PRESSURE: 60 MMHG | RESPIRATION RATE: 16 BRPM

## 2020-11-13 DIAGNOSIS — F02.80 LATE ONSET ALZHEIMER'S DISEASE WITHOUT BEHAVIORAL DISTURBANCE (H): ICD-10-CM

## 2020-11-13 DIAGNOSIS — L89.151 PRESSURE ULCER OF COCCYGEAL REGION, STAGE 1: ICD-10-CM

## 2020-11-13 DIAGNOSIS — L03.116 CELLULITIS OF LEFT LOWER EXTREMITY: Primary | ICD-10-CM

## 2020-11-13 DIAGNOSIS — G30.1 LATE ONSET ALZHEIMER'S DISEASE WITHOUT BEHAVIORAL DISTURBANCE (H): ICD-10-CM

## 2020-11-13 DIAGNOSIS — R60.0 LOWER EXTREMITY EDEMA: ICD-10-CM

## 2020-11-13 PROCEDURE — 99358 PROLONG SERVICE W/O CONTACT: CPT | Mod: 95 | Performed by: NURSE PRACTITIONER

## 2020-11-13 ASSESSMENT — MIFFLIN-ST. JEOR: SCORE: 743.87

## 2020-11-13 NOTE — LETTER
"    11/13/2020        RE: Litzy Kenny  C/o Ben Gifft  [address]  [facility] [address]        [place] [facility]   Litzy Kenny is being evaluated via a billable video visit.   The patient has been notified of following:  \"This video visit will be conducted via a call between you and your provider. We have found that certain health care needs can be provided without the need for an in-person physical exam.  This service lets us provide the care you need with a video conversation. If during the course of the call the provider feels a video visit is not appropriate, you will not be charged for this service.\"   The provider has received verbal consent for a Video Visit from the patient or first contact? Yes  Patient  or facility staff would like the video invitation sent by: N/A   Video Start Time: 2:00  [place] Medical Record Number: [id number]  [facility] of [facility] at the time of [place] [facility]  NON-FACE TO FACE PROLONGED SERVICE    Litzy Kenny 4/10/1932    Date of Related Face to Face Service: 11/17/20    INTENT OF SERVICE  This is an extended evaluation of patient's specific problem.  The service relates to a recent or future E/M visit.  Documentation supports medical necessity, relates to ongoing patient management and documents start times and stop times.    Regarding the following diagnoses:     Cellulitis of left lower extremity  Lower extremity edema  Pressure ulcer of coccygeal region, stage 1  Late onset Alzheimer's disease without behavioral disturbance (H),     I discussed with Kat and Ben who are the children of the patient.  (Start time 2:00  Stop time 2:55 )  Also present for the care conference were Cadence SANDS, lymphedema therapist Melisa, and Shirin [facility].     This care conference was held due to concerns about Litzy's current state of health. She has been receiving lymphedema therapy for lower " extremity edema. Due to the current dependent positioning of her lower extremities, this has not improved but has worsened and has frequent weeping and now open areas. She has become weaker and currently ambulates with walker and staff assist to the bathroom q 2-3 hours but in the morning, she is unable to walk so has to use the wheelchair. This is a change from previous when she was ambulating around the apartment independently with walker for the majority of the day. She has a stage 1 pressure ulcer on the coccyx area from sitting in the same position on the couch. She sleeps sitting up in the seated position on the couch. Her appetite has decreased. She has chronic pain from multiple abnormalities in her back.     Discussed my concern of the rapid decline of functional status from one month ago. This very well could be that she took a step down in her dementia and will stay at this level of function for several months but she could also continue to decline from a physical function standpoint and be unable to get up from the couch. I feel that she may need to use a mechanical lift in the future therefore would recommend that she move to memory care and have the ability to settle in there and they would be able to use a mechanical lift in the memory care if needed. We also discussed the impaired skin integrity and treatment if the area on the coccyx worsens.     Family understand the decline as they too have noted his. They do not want to move Litzy at this point in time because they do not want to separate their parents. They are however, interested in touring the memory care and the possibility of putting both of their parents there with rooms next to one another as they only have studio apartments in the memory care.     ASSESSMENT/PLAN     Cellulitis of left lower extremity  Lower extremity edema  Pressure ulcer of coccygeal region, stage 1  Late onset Alzheimer's disease without behavioral disturbance  (H)    Recommendations:  --resident have increased services as much as possible if she stays in the AL  --would like to see her get PT which she has been declining at this time  --highly recommend moving to memory care for increased physical needs and worsening cognitive impairment    TIME  Total time spent with Non-Face to Face prolonged service 55 minutes minutes.     Electronically signed by:  TASHIA Caro CNP                  Sincerely,        TASHIA Caro CNP

## 2020-11-13 NOTE — PROGRESS NOTES
"[place] [facility]   Litzy Kenny is being evaluated via a billable video visit.   The patient has been notified of following:  \"This video visit will be conducted via a call between you and your provider. We have found that certain health care needs can be provided without the need for an in-person physical exam.  This service lets us provide the care you need with a video conversation. If during the course of the call the provider feels a video visit is not appropriate, you will not be charged for this service.\"   The provider has received verbal consent for a Video Visit from the patient or first contact? Yes  Patient  or facility staff would like the video invitation sent by: N/A   Video Start Time: 2:00  [place] Medical Record Number: [id number]  Place of Location at the time of visit: [facility]  NON-FACE TO FACE PROLONGED SERVICE    Litzy Kenny 4/10/1932    Date of Related Face to Face Service: 11/17/20    INTENT OF SERVICE  This is an extended evaluation of patient's specific problem.  The service relates to a recent or future E/M visit.  Documentation supports medical necessity, relates to ongoing patient management and documents start times and stop times.    Regarding the following diagnoses:     Cellulitis of left lower extremity  Lower extremity edema  Pressure ulcer of coccygeal region, stage 1  Late onset Alzheimer's disease without behavioral disturbance (H),     I discussed with Kat and Ben who are the children of the patient.  (Start time 2:00  Stop time 2:55 )  Also present for the care conference were Cadence SANDS, lymphedema therapist Melisa, and [facility]     This care conference was held due to concerns about Litzy's current state of health. She has been receiving lymphedema therapy for lower extremity edema. Due to the current dependent positioning of her lower extremities, this has not improved but " has worsened and has frequent weeping and now open areas. She has become weaker and currently ambulates with walker and staff assist to the bathroom q 2-3 hours but in the morning, she is unable to walk so has to use the wheelchair. This is a change from previous when she was ambulating around the apartment independently with walker for the majority of the day. She has a stage 1 pressure ulcer on the coccyx area from sitting in the same position on the couch. She sleeps sitting up in the seated position on the couch. Her appetite has decreased. She has chronic pain from multiple abnormalities in her back.     Discussed my concern of the rapid decline of functional status from one month ago. This very well could be that she took a step down in her dementia and will stay at this level of function for several months but she could also continue to decline from a physical function standpoint and be unable to get up from the couch. I feel that she may need to use a mechanical lift in the future therefore would recommend that she move to memory care and have the ability to settle in there and they would be able to use a mechanical lift in the memory care if needed. We also discussed the impaired skin integrity and treatment if the area on the coccyx worsens.     Family understand the decline as they too have noted his. They do not want to move Litzy at this point in time because they do not want to separate their parents. They are however, interested in touring the memory care and the possibility of putting both of their parents there with rooms next to one another as they only have studio apartments in the memory care.     ASSESSMENT/PLAN     Cellulitis of left lower extremity  Lower extremity edema  Pressure ulcer of coccygeal region, stage 1  Late onset Alzheimer's disease without behavioral disturbance (H)    Recommendations:  --resident have increased services as much as possible if she stays in the AL  --would like  to see her get PT which she has been declining at this time  --highly recommend moving to memory care for increased physical needs and worsening cognitive impairment    TIME  Total time spent with Non-Face to Face prolonged service 55 minutes minutes.     Electronically signed by:  TASHIA Caro CNP

## 2020-11-17 ENCOUNTER — ASSISTED LIVING VISIT (OUTPATIENT)
Dept: GERIATRICS | Facility: CLINIC | Age: 85
End: 2020-11-17
Payer: MEDICARE

## 2020-11-17 VITALS
SYSTOLIC BLOOD PRESSURE: 113 MMHG | HEART RATE: 89 BPM | HEIGHT: 59 IN | TEMPERATURE: 97.7 F | RESPIRATION RATE: 16 BRPM | WEIGHT: 90 LBS | BODY MASS INDEX: 18.14 KG/M2 | OXYGEN SATURATION: 95 % | DIASTOLIC BLOOD PRESSURE: 60 MMHG

## 2020-11-17 DIAGNOSIS — F02.80 LATE ONSET ALZHEIMER'S DISEASE WITHOUT BEHAVIORAL DISTURBANCE (H): ICD-10-CM

## 2020-11-17 DIAGNOSIS — R60.0 LOWER EXTREMITY EDEMA: ICD-10-CM

## 2020-11-17 DIAGNOSIS — I48.0 PAROXYSMAL ATRIAL FIBRILLATION (H): ICD-10-CM

## 2020-11-17 DIAGNOSIS — G89.4 CHRONIC PAIN DISORDER: ICD-10-CM

## 2020-11-17 DIAGNOSIS — E44.0 MODERATE PROTEIN-CALORIE MALNUTRITION (H): ICD-10-CM

## 2020-11-17 DIAGNOSIS — Z00.00 ANNUAL PHYSICAL EXAM: Primary | ICD-10-CM

## 2020-11-17 DIAGNOSIS — S32.010D COMPRESSION FRACTURE OF L1 VERTEBRA WITH ROUTINE HEALING, SUBSEQUENT ENCOUNTER: ICD-10-CM

## 2020-11-17 DIAGNOSIS — G30.1 LATE ONSET ALZHEIMER'S DISEASE WITHOUT BEHAVIORAL DISTURBANCE (H): ICD-10-CM

## 2020-11-17 DIAGNOSIS — L89.151 PRESSURE ULCER OF COCCYGEAL REGION, STAGE 1: ICD-10-CM

## 2020-11-17 ASSESSMENT — MIFFLIN-ST. JEOR: SCORE: 743.87

## 2020-11-17 NOTE — PROGRESS NOTES
"[facility]  Chief Complaint   Patient presents with     Annual Comprehensive Exam Assisted Living     [place] Medical Record Number: [id number]  [id number]    HPI:    Litzy Kenny is a 88 year old (4/10/1932), who is being seen today for an annual comprehensive visit. HPI information obtained from: facility chart records, facility staff, patient report and [facility] Epic chart review.     Litzy was visited today as a follow up to last week from cellulitis. Unable to visualize her legs today because lymph wraps are in place but per home care nursing he redness did not seem to improve after the abx treatment. Litzy complains of pain in the left foot while walking. \"bladder doesn't work anymore\" so she has staff come in every two hours to toilet her. She is able to use the walker with staff assist but takes slow, small steps. In the morning, she is unable to use the walker therefore has to use the wheelchair to get to the restroom. Spoke to both Shan and Litzy about at doing a PT assessment at the very least. They would like to think about it at this time.     ALLERGIES: No clinical screening - see comments and Tramadol  PAST MEDICAL HISTORY:  has a past medical history of Cancer (H), Cataract, and Cystoid macular edema.  PAST SURGICAL HISTORY:  has a past surgical history that includes cataract iol, rt/lt.  IMMUNIZATIONS:  Immunization History   Administered Date(s) Administered     FLU 6-35 months 09/22/2012     Flu, Unspecified 11/28/2000, 10/30/2001, 11/04/2002, 11/11/2003, 10/14/2004, 10/21/2005, 10/26/2006, 10/30/2007, 10/21/2008, 10/24/2009, 10/18/2010     Influenza (High Dose) 3 valent vaccine 10/07/2014, 10/08/2015, 11/21/2016, 09/19/2017, 09/23/2019     Influenza (IIV3) PF 11/28/2000, 10/30/2001, 11/04/2002, 10/14/2004, 10/21/2005, 10/26/2006, 10/30/2007, 10/21/2008, 10/24/2009, 10/18/2010, 10/10/2011, 10/10/2011 "     Influenza Vaccine IM > 6 months Valent IIV4 10/11/2013     Influenza Vaccine IM Ages 6-35 Months 4 Valent (PF) 10/11/2013     Influenza, Quad, High Dose, Pf, 65yr + 09/24/2020     Pneumo Conj 13-V (2010&after) 11/19/2019     Pneumococcal 23 valent 05/03/1999, 11/19/2009     Td (Adult), Adsorbed 08/15/2002, 03/08/2010     Zoster vaccine, live 10/29/2009     Above immunizations pulled from [facility]. [facility] and facility records also reconciled. Outstanding information sent to  to update [facility].  Future immunizations needed:  TDAP and Provider working with patient, first contact, and facility to administer immunizations.    Current Outpatient Medications   Medication Sig Dispense Refill     ACETAMINOPHEN EXTRA STRENGTH 500 MG tablet TAKE TWO TABLETS (1000MG) BY MOUTH THREE TIMES DAILY 60 tablet PRN     ASPERCREME LIDOCAINE 4 % Patch APPLY 1 PATCH TOPICALLY TO LEFT THIGH (ON IN THE MORNING, OFF AT NIGHT) 30 patch PRN     calcitonin, salmon, (MIACALCIN) 200 UNIT/ACT nasal spray SPRAY 1 SPRAY INTO ONE NOSTRIL ALTERNATING NOSTRILS DAILY. ALTERNATE NOSTRIL EACH DAY. 3.7 mL 97     calcium carbonate 600 mg-vitamin D 400 units (CALTRATE) 600-400 MG-UNIT per tablet TAKE 1 TABLET BY MOUTH TWICE DAILY 60 tablet PRN     carvedilol (COREG) 12.5 MG tablet TAKE 1 TABLET BY MOUTH TWICE DAILY 56 tablet 97     Cetaphil Moisturizing (CETAPHIL) external lotion Apply topically 2 times daily       Cholecalciferol (VITAMIN D3) 50 MCG (2000 UT) CAPS TAKE TWO CAPSULES (100MCG / 4000 UNITS) BY MOUTH ONCE DAILY 56 capsule PRN     HYDROmorphone (DILAUDID) 2 MG tablet TAKE 1 TABLET BY MOUTH THREE TIMES DAILY 90 tablet 0     MYRBETRIQ 50 MG 24 hr tablet TAKE 1 TABLET BY MOUTH ONCE DAILY 30 tablet PRN     senna-docusate (SENOKOT-S/PERICOLACE) 8.6-50 MG tablet Take 1 tablet by mouth daily as needed for constipation       VITAMIN D3 25 MCG (1000 UT) tablet TAKE TWO TABLETS (2000 UNITS) BY MOUTH ONCE DAILY 60 tablet PRN  "      Case Management:  I have reviewed the Assisted Living care plan, current immunizations and preventive care/cancer screening. .Future cancer screening is not clinically indicated secondary to age/goals of care Patient's desire to return to the community is not assessible due to cognitive impairment. Current Level of Care is appropriate.    Advance Directive Discussion:    I reviewed the current advanced directives as reflected in EPIC, the POLST and the facility chart, and verified the congruency of orders. I contacted the first party and discussed the plan of Care. I did not due to cognitive impairment review the advance directives with the resident.     Team Discussion:  I communicated with the appropriate disciplines involved with the Plan of Care: Nursing   and     Patient's goal is pain control and comfort.  Information reviewed:  Medications, vital signs, orders, and nursing notes.    ROS:  4 point ROS including Respiratory, CV, GI and , other than that noted in the HPI,  is negative    Vitals:  /60   Pulse 89   Temp 97.7  F (36.5  C)   Resp 16   Ht 1.499 m (4' 11\")   Wt 40.8 kg (90 lb)   SpO2 95%   BMI 18.18 kg/m   Body mass index is 18.18 kg/m .  Exam:  GENERAL APPEARANCE:  Alert, in no distress, pleasant, cooperative, frail  EYES: no discharge or mattering on lids or lashes noted  ENT:  moist mucous membranes, hearing acuity intact  NECK: supple, symmetrical  RESP: no respiratory distress, Lung sounds clear, patient is on room air  CV:  Rate regular.  Edema trace in left lower extremity, and none in the right lower extremity.  VASCULAR: warm extremities without open areas.  ABDOMEN: normal bowel sounds, soft, nontender.  M/S:   Gait and station: ambulates with walker and assist, uses wheelchair in the morning, no tenderness or swelling of the joints; able to move all extremities, kyphosis  SKIN:  Inspection and palpation of skin and subcutaneous tissue: (virtual visit with " home care RN completed on 11/19/20 and left lower extremity with hilaria color, macerated skin on the toes, small amount of serousangenous drainage on bandages)  NEURO: no facial asymmetry, no speech deficits and able to follow directions, moves all extremities symmetrically  PSYCH:  insight, judgement, and memory impaired affect and mood normal    Lab/Diagnostic data:   CBC RESULTS:   Recent Labs   Lab Test 08/13/20   WBC 8.3   RBC 3.49*   HGB 11.5*   HCT 35.8   *   MCH 33.0   MCHC 32.1   RDW 13.1          Last Basic Metabolic Panel:  Recent Labs   Lab Test 08/13/20 03/05/20    137   POTASSIUM 4.2  --    CHLORIDE 101  --    KEAYNNA 9.4  --    CO2 24  --    BUN 27  --    CR 0.77  --    GLC 80  --        Liver Function Studies -   Recent Labs   Lab Test 08/13/20   PROTTOTAL 7.4   ALBUMIN 3.9   BILITOTAL 0.6   ALKPHOS 27*   AST 22   ALT 11       ASSESSMENT/PLAN  Lower extremity edema  Has three opened areas on the LLE with moderate amount of weeping from the left foot. She is noncompliant with elevating legs and sleeps with on the couch with legs in the dependent positioning so having small progress with decreasing the fluid in the bilateral lower extremities. recently treated with cephalexin.   --continue with lymph therapy  --[facility] RN and WO nurse to follow     Paroxysmal atrial fibrillation (H)   essential HTN  Comment: HR and blood pressure controlled. Based on JNC-8 goals,  patients age of 88 year old, no presence of diabetes or CKD, and goals of care goal BP is <150/90 mm Hg. Patient is stable with current plan of care and routine assessment..  --continue with carvedilol to 12.5 mg BID   --Continue to monitor blood pressure and heart rate, adjust medications as needed.     Late onset Alzheimer's disease without behavioral disturbance (H)  Progressing. Ambulating less and appears frail. Believe that she would benefit from memory care for a more cares. This was discussed with family during a care  conference last week.    --recommend memory care  --continue with assisted living for assistance with cares, meals and medications.     Pressure ulcer of coccygeal region, stage 1  Due to spending more time in the sitting position.   -- barrier cream BID to coccyx area. Encourage frequent movement throughout the day.   --encourage sleeping in her hospital bed.       Moderate protein-calorie malnutrition   Body mass index is 18.18 kg/m . due to progression of dementia.   --facility provides three meals per day  --offer ensure once daily    Closed compression fracture of lumbosacral spine with routine healing, subsequent encounter   Spinal stenosis of lumbar region without neurogenic claudication   Chronic bilateral low back pain without sciatica   Chronic pain syndrome   Patient has long history of chronic back pain. Imaging with L3 and L1 compression fracture sustained from fall last year.  Previousy taking 40 mg oxycodone per day which was changed to dilaudid due to altered mental status prior to moving to the [place]. We had discussed tapering off the dilaudid but they have not agreed with this due to the chronic pain. She has significant deformities in her back.   --continue tylenol 500 mg three times a day  --continue aspercreame/lidoderm patch once a day  --dilaudid 2 mg TID    Osteoporosis  --continue with calcitonin spray and calcium/vit D supplements    Electronically signed by:  TASHIA Caro CNP

## 2020-11-23 PROCEDURE — 87186 SC STD MICRODIL/AGAR DIL: CPT

## 2020-11-23 PROCEDURE — 87070 CULTURE OTHR SPECIMN AEROBIC: CPT

## 2020-11-23 PROCEDURE — 87077 CULTURE AEROBIC IDENTIFY: CPT

## 2020-11-24 ENCOUNTER — TRANSFERRED RECORDS (OUTPATIENT)
Dept: HEALTH INFORMATION MANAGEMENT | Facility: CLINIC | Age: 85
End: 2020-11-24

## 2020-11-25 DIAGNOSIS — S32.010D COMPRESSION FRACTURE OF L1 VERTEBRA WITH ROUTINE HEALING, SUBSEQUENT ENCOUNTER: ICD-10-CM

## 2020-11-25 RX ORDER — HYDROMORPHONE HYDROCHLORIDE 2 MG/1
TABLET ORAL
Qty: 90 TABLET | Refills: 0 | Status: SHIPPED | OUTPATIENT
Start: 2020-11-25 | End: 2020-12-26

## 2020-11-27 LAB
BACTERIA SPEC CULT: ABNORMAL
Lab: ABNORMAL
SPECIMEN SOURCE: ABNORMAL

## 2020-12-07 ENCOUNTER — ASSISTED LIVING VISIT (OUTPATIENT)
Dept: GERIATRICS | Facility: CLINIC | Age: 85
End: 2020-12-07
Payer: MEDICARE

## 2020-12-07 VITALS
HEART RATE: 89 BPM | RESPIRATION RATE: 16 BRPM | BODY MASS INDEX: 18.14 KG/M2 | DIASTOLIC BLOOD PRESSURE: 60 MMHG | SYSTOLIC BLOOD PRESSURE: 113 MMHG | HEIGHT: 59 IN | TEMPERATURE: 97 F | OXYGEN SATURATION: 95 % | WEIGHT: 90 LBS

## 2020-12-07 DIAGNOSIS — A49.8 PSEUDOMONAS INFECTION: Primary | ICD-10-CM

## 2020-12-07 ASSESSMENT — MIFFLIN-ST. JEOR: SCORE: 743.87

## 2020-12-07 NOTE — LETTER
12/7/2020        RE: Litzy Kenny  C/o Ben Kenny  [address] [place]  [facility] [address]        [facility]  [place] Medical Record Number: [id number]  Place of Service where encounter took place: [facility] ([facility] [[id number]]  Chief Complaint   Patient presents with     RECHECK       HPI:    Litzy Kenny is a 88 year old (4/10/1932), who is being seen today for an episodic care visit. HPI information obtained from: facility chart records, facility staff, patient report and [facility] chart review.    Received a message last week from home care RN that there was maceration of the skin with a large amount of drainage on the bandages. Ciprofloxacin was extended for an additional five days and Magen was seen today in conjunction with the home care RN.     Today Litzy has pain with palpation and removal of the dressing to the LLE. She has been sleeping on the couch with legs up over night and during the day she at times will elevate her leg on a chair. She does note some heel pain when it is touching the couch or chair. Spoke to Shan and Litzy about obtaining a foam heel pressure boot to offload the pressure to allow for better healing and prevent further injury. They would like to think about it at this time and if they decide to purchase them, home care RN will call [facility] to place the order.     Today, home care RN reports that in comparison to last week prior to extending the abx, the leg looks much improved with far less drainage, redness and maceration of the skin. Today, Shan has noted there to be less saturation of the bandage.     Past Medical and Surgical History reviewed in Epic today.    MEDICATIONS:  Current Outpatient Medications   Medication Sig Dispense Refill     ACETAMINOPHEN EXTRA STRENGTH 500 MG tablet TAKE TWO TABLETS (1000MG) BY MOUTH THREE TIMES DAILY 60 tablet PRN     ASPERCREME LIDOCAINE 4 % Patch APPLY 1 PATCH TOPICALLY TO LEFT THIGH (ON IN  "THE MORNING, OFF AT NIGHT) 30 patch PRN     calcitonin, salmon, (MIACALCIN) 200 UNIT/ACT nasal spray SPRAY 1 SPRAY INTO ONE NOSTRIL ALTERNATING NOSTRILS DAILY. ALTERNATE NOSTRIL EACH DAY. 3.7 mL 97     calcium carbonate 600 mg-vitamin D 400 units (CALTRATE) 600-400 MG-UNIT per tablet TAKE 1 TABLET BY MOUTH TWICE DAILY 60 tablet PRN     carvedilol (COREG) 12.5 MG tablet TAKE 1 TABLET BY MOUTH TWICE DAILY 56 tablet 97     Cetaphil Moisturizing (CETAPHIL) external lotion Apply topically 2 times daily       Cholecalciferol (VITAMIN D3) 50 MCG (2000 UT) CAPS TAKE TWO CAPSULES (100MCG / 4000 UNITS) BY MOUTH ONCE DAILY 56 capsule PRN     HYDROmorphone (DILAUDID) 2 MG tablet TAKE 1 TABLET BY MOUTH THREE TIMES DAILY 90 tablet 0     MYRBETRIQ 50 MG 24 hr tablet TAKE 1 TABLET BY MOUTH ONCE DAILY 30 tablet PRN     senna-docusate (SENOKOT-S/PERICOLACE) 8.6-50 MG tablet Take 1 tablet by mouth daily as needed for constipation       VITAMIN D3 25 MCG (1000 UT) tablet TAKE TWO TABLETS (2000 UNITS) BY MOUTH ONCE DAILY 60 tablet PRN     REVIEW OF SYSTEMS:  Limited secondary to cognitive impairment    Objective:  /60   Pulse 89   Temp 97  F (36.1  C)   Resp 16   Ht 1.499 m (4' 11\")   Wt 40.8 kg (90 lb)   SpO2 95%   BMI 18.18 kg/m    Exam:  GENERAL APPEARANCE:  Alert, in no distress, pleasant, cooperative  EYES: no discharge or mattering on lids or lashes noted  ENT:  moist mucous membranes, hearing acuity intact  NECK: supple, symmetrical  RESP: no respiratory distress, patient is on room air  CV:  Edema trace in bilateral lower extremities. .  M/S:   Gait and station: ambulates with walker and stand by assist, unsafe without assistance.  SKIN:  Inspection and palpation of skin and subcutaneous tissue: LLE with decreased redness, small patches of maceration on foot and leg but otherwise some flaky dry skin, scant amount of clear drainage.   NEURO: no facial asymmetry, no speech deficits and able to follow directions, moves " all extremities symmetrically  PSYCH:  insight, judgement, and memory impaired affect and mood normal    Labs:   CBC RESULTS:   Recent Labs   Lab Test 08/13/20   WBC 8.3   RBC 3.49*   HGB 11.5*   HCT 35.8   *   MCH 33.0   MCHC 32.1   RDW 13.1          Last Basic Metabolic Panel:  Recent Labs   Lab Test 08/13/20 03/05/20    137   POTASSIUM 4.2  --    CHLORIDE 101  --    KEYANNA 9.4  --    CO2 24  --    BUN 27  --    CR 0.77  --    GLC 80  --        Liver Function Studies -   Recent Labs   Lab Test 08/13/20   PROTTOTAL 7.4   ALBUMIN 3.9   BILITOTAL 0.6   ALKPHOS 27*   AST 22   ALT 11       ASSESSMENT/PLAN:  Pseudomonas skin infection  Completed a 7 day course of ciprofloxacin but continued with large amount of drainage and redness so abx were extended. There is much improvement to the appearance of the leg. The amount of drainage has decreased substantially and there is no further redness.   --will continue with cipro as prescribed with last dose on Wednesday.   --continue with daily dressing changes throughout this week and weekend and home care RN will continue to evaluate the need for daily dressing changes and is hoping to decrease the frequency early next week.   --would highly recommend the foam boots for Litzy as she is at high risk for a pressure ulcer on the left heel.    Electronically signed by:  TASHIA Caro CNP               Sincerely,        TASHIA Caro CNP

## 2020-12-07 NOTE — PROGRESS NOTES
[facility]  [place] Medical Record Number: [id number]  Place of Service where encounter took place: [facility] ([facility]) [[id number]]  Chief Complaint   Patient presents with     RECHECK       HPI:    Litzy Kenny is a 88 year old (4/10/1932), who is being seen today for an episodic care visit. HPI information obtained from: facility chart records, facility staff, patient report and [facility] chart review.    Received a message last week from home care RN that there was maceration of the skin with a large amount of drainage on the bandages. Ciprofloxacin was extended for an additional five days and Magen was seen today in conjunction with the home care RN.     Today Litzy has pain with palpation and removal of the dressing to the LLE. She has been sleeping on the couch with legs up over night and during the day she at times will elevate her leg on a chair. She does note some heel pain when it is touching the couch or chair. Spoke to Shan and Litzy about obtaining a foam heel pressure boot to offload the pressure to allow for better healing and prevent further injury. They would like to think about it at this time and if they decide to purchase them, home care RN will call [facility] to place the order.     Today, home care RN reports that in comparison to last week prior to extending the abx, the leg looks much improved with far less drainage, redness and maceration of the skin. Today, Shan has noted there to be less saturation of the bandage.     Past Medical and Surgical History reviewed in Epic today.    MEDICATIONS:  Current Outpatient Medications   Medication Sig Dispense Refill     ACETAMINOPHEN EXTRA STRENGTH 500 MG tablet TAKE TWO TABLETS (1000MG) BY MOUTH THREE TIMES DAILY 60 tablet PRN     ASPERCREME LIDOCAINE 4 % Patch APPLY 1 PATCH TOPICALLY TO LEFT THIGH (ON IN THE MORNING, OFF AT NIGHT) 30 patch PRN     calcitonin, salmon, (MIACALCIN) 200 UNIT/ACT nasal spray SPRAY 1  "SPRAY INTO ONE NOSTRIL ALTERNATING NOSTRILS DAILY. ALTERNATE NOSTRIL EACH DAY. 3.7 mL 97     calcium carbonate 600 mg-vitamin D 400 units (CALTRATE) 600-400 MG-UNIT per tablet TAKE 1 TABLET BY MOUTH TWICE DAILY 60 tablet PRN     carvedilol (COREG) 12.5 MG tablet TAKE 1 TABLET BY MOUTH TWICE DAILY 56 tablet 97     Cetaphil Moisturizing (CETAPHIL) external lotion Apply topically 2 times daily       Cholecalciferol (VITAMIN D3) 50 MCG (2000 UT) CAPS TAKE TWO CAPSULES (100MCG / 4000 UNITS) BY MOUTH ONCE DAILY 56 capsule PRN     HYDROmorphone (DILAUDID) 2 MG tablet TAKE 1 TABLET BY MOUTH THREE TIMES DAILY 90 tablet 0     MYRBETRIQ 50 MG 24 hr tablet TAKE 1 TABLET BY MOUTH ONCE DAILY 30 tablet PRN     senna-docusate (SENOKOT-S/PERICOLACE) 8.6-50 MG tablet Take 1 tablet by mouth daily as needed for constipation       VITAMIN D3 25 MCG (1000 UT) tablet TAKE TWO TABLETS (2000 UNITS) BY MOUTH ONCE DAILY 60 tablet PRN     REVIEW OF SYSTEMS:  Limited secondary to cognitive impairment    Objective:  /60   Pulse 89   Temp 97  F (36.1  C)   Resp 16   Ht 1.499 m (4' 11\")   Wt 40.8 kg (90 lb)   SpO2 95%   BMI 18.18 kg/m    Exam:  GENERAL APPEARANCE:  Alert, in no distress, pleasant, cooperative  EYES: no discharge or mattering on lids or lashes noted  ENT:  moist mucous membranes, hearing acuity intact  NECK: supple, symmetrical  RESP: no respiratory distress, patient is on room air  CV:  Edema trace in bilateral lower extremities. .  M/S:   Gait and station: ambulates with walker and stand by assist, unsafe without assistance.  SKIN:  Inspection and palpation of skin and subcutaneous tissue: LLE with decreased redness, small patches of maceration on foot and leg but otherwise some flaky dry skin, scant amount of clear drainage.   NEURO: no facial asymmetry, no speech deficits and able to follow directions, moves all extremities symmetrically  PSYCH:  insight, judgement, and memory impaired affect and mood " normal    Labs:   CBC RESULTS:   Recent Labs   Lab Test 08/13/20   WBC 8.3   RBC 3.49*   HGB 11.5*   HCT 35.8   *   MCH 33.0   MCHC 32.1   RDW 13.1          Last Basic Metabolic Panel:  Recent Labs   Lab Test 08/13/20 03/05/20    137   POTASSIUM 4.2  --    CHLORIDE 101  --    KEYANNA 9.4  --    CO2 24  --    BUN 27  --    CR 0.77  --    GLC 80  --        Liver Function Studies -   Recent Labs   Lab Test 08/13/20   PROTTOTAL 7.4   ALBUMIN 3.9   BILITOTAL 0.6   ALKPHOS 27*   AST 22   ALT 11       ASSESSMENT/PLAN:  Pseudomonas skin infection  Completed a 7 day course of ciprofloxacin but continued with large amount of drainage and redness so abx were extended. There is much improvement to the appearance of the leg. The amount of drainage has decreased substantially and there is no further redness.   --will continue with cipro as prescribed with last dose on Wednesday.   --continue with daily dressing changes throughout this week and weekend and home care RN will continue to evaluate the need for daily dressing changes and is hoping to decrease the frequency early next week.   --would highly recommend the foam boots for Litzy as she is at high risk for a pressure ulcer on the left heel.    Electronically signed by:  TASHIA Caro CNP

## 2020-12-22 ENCOUNTER — MEDICAL CORRESPONDENCE (OUTPATIENT)
Dept: HEALTH INFORMATION MANAGEMENT | Facility: CLINIC | Age: 85
End: 2020-12-22

## 2020-12-23 DIAGNOSIS — S32.010D COMPRESSION FRACTURE OF L1 VERTEBRA WITH ROUTINE HEALING, SUBSEQUENT ENCOUNTER: ICD-10-CM

## 2020-12-26 RX ORDER — HYDROMORPHONE HYDROCHLORIDE 2 MG/1
TABLET ORAL
Qty: 90 TABLET | Refills: 0 | Status: SHIPPED | OUTPATIENT
Start: 2020-12-26 | End: 2021-01-21

## 2021-01-08 DIAGNOSIS — A49.8 PSEUDOMONAS INFECTION: Primary | ICD-10-CM

## 2021-01-08 RX ORDER — MICONAZOLE NITRATE 20 MG/G
CREAM TOPICAL
Qty: 142 G | Refills: 97 | Status: SHIPPED | OUTPATIENT
Start: 2021-01-08 | End: 2022-06-30

## 2021-01-13 ENCOUNTER — ASSISTED LIVING VISIT (OUTPATIENT)
Dept: GERIATRICS | Facility: CLINIC | Age: 86
End: 2021-01-13
Payer: MEDICARE

## 2021-01-13 VITALS — OXYGEN SATURATION: 94 % | TEMPERATURE: 97.1 F

## 2021-01-13 DIAGNOSIS — M81.0 OSTEOPOROSIS WITHOUT CURRENT PATHOLOGICAL FRACTURE, UNSPECIFIED OSTEOPOROSIS TYPE: ICD-10-CM

## 2021-01-13 DIAGNOSIS — G30.1 LATE ONSET ALZHEIMER'S DISEASE WITHOUT BEHAVIORAL DISTURBANCE (H): ICD-10-CM

## 2021-01-13 DIAGNOSIS — M48.061 SPINAL STENOSIS OF LUMBAR REGION WITHOUT NEUROGENIC CLAUDICATION: ICD-10-CM

## 2021-01-13 DIAGNOSIS — F02.80 LATE ONSET ALZHEIMER'S DISEASE WITHOUT BEHAVIORAL DISTURBANCE (H): ICD-10-CM

## 2021-01-13 DIAGNOSIS — I48.0 PAROXYSMAL ATRIAL FIBRILLATION (H): ICD-10-CM

## 2021-01-13 DIAGNOSIS — G89.4 CHRONIC PAIN DISORDER: ICD-10-CM

## 2021-01-13 DIAGNOSIS — S32.010D COMPRESSION FRACTURE OF L1 VERTEBRA WITH ROUTINE HEALING, SUBSEQUENT ENCOUNTER: Primary | ICD-10-CM

## 2021-01-13 DIAGNOSIS — I10 ESSENTIAL HYPERTENSION: ICD-10-CM

## 2021-01-13 NOTE — LETTER
1/13/2021        RE: Litzy Kenny  C/o Ben Kenny  [address]  [facility] [address]        Litzy Kenny is a 88 year old female seen January 13, 2021 at [facility] where she has resided for one year (admit 1/2020)  Pt is seen in her apartment with her  Shan present.   She has eaten just a few bites of her breakfast, states she doesn't want anymore.    Continues to note back pain, but no difference in reporting since Dilaudid dose decreased.     She has not been out of the apartment since they moved in.     She had lower extremity stasis ulcers that were slow to heal, required abx last fall for secondary infection.    With excellent care from [facility] they have now finally healed.  Using ammonium lactate or Cetaphil to keep skin moist and intact.    She has urinary incontinence.    By chart review, she has been seen Urology for this most recently in April 2019, started on Myrbetriq    Pt was not interested in other options offered, e.g., Botox   Pt has a past h/o PAF, followed by Cardiology with CHADS-VASc score 4.   She has declined anticoagulation.     Pt was hospitalized in October 2019 for venous stasis ulcer, dementia and impaired mobility. She went to TCU then returned home with her 's care.   She was readmitted to [facility] 12/2019 for altered mental status and new compression fractures at L3 and L1.   She also has severe spinal stenosis lumbar spine, and chronic compression fracture L4.  She has been on narcotics for many years.       PMH:  Cystoid macular edema, right eye  Cataract  Atrial fibrillation /PAF  Dementia, late onset Alzheimer's type  Osteoporosis   Multiple compression fractures  Chronic bilateral LBP without sciatica  Chronic pain /controlled substance agreement signed  HTN  Anemia  H/o SCC and BCC  Venous stasis ulcers LEs, 2019  Lipodermatosclerosis    SH:  [place]  in [place].   They previously lived in a house in [place]; had lived  there since 1960.   They have a daughter Nathalie (in [place]), son Reynold (in [place]) and son Ben (in [place]).   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   Non smoker    ROS: ambulatory with FWW.      SLUMS 9/30  CPT 4.1    Poor appetite, weight 90 lbs  Incontinence  Tinetti 20/28      EXAM:  Very frail, NAD  Temp 97.1  F (36.2  C)   SpO2 94%    VS at today's visit:  /54  HR 78   O2sat 95% on room air.    Severe kyphosis, folded almost in half when standing  Neck supple without adenopathy  Lungs with decreased BS, no rales or wheeze  Heart RRR s1s2 tachy  Abd soft, NT, no distention, +BS  Ext without edema, wounds are healed.     Neuro: STML, limited history, no focal deficits  Psych: guarded, has about 100 olga pins in her hair, obsessive-compulsive    Last Comprehensive Metabolic Panel:  Sodium   Date Value Ref Range Status   08/13/2020 136 136 - 145 mmol/L Final     Potassium   Date Value Ref Range Status   08/13/2020 4.2 3.5 - 5.0 mmol/L Final     Chloride   Date Value Ref Range Status   08/13/2020 101 98 - 107 mmol/L Final     Carbon Dioxide   Date Value Ref Range Status   08/13/2020 24 22 - 31 mmol/L Final     Anion Gap   Date Value Ref Range Status   08/13/2020 11 5 - 18 mmol/L Final     Glucose   Date Value Ref Range Status   08/13/2020 80 70 - 125 mg/dL Final     Urea Nitrogen   Date Value Ref Range Status   08/13/2020 27 8 - 28 mg/dL Final     Creatinine   Date Value Ref Range Status   08/13/2020 0.77 0.60 - 1.10 mg/dL Final     GFR Estimate   Date Value Ref Range Status   08/13/2020 >60 >60 ml/min/1.73m2 Final     Calcium   Date Value Ref Range Status   08/13/2020 9.4 8.5 - 10.5 mg/dL Final     Lab Results   Component Value Date    AST 22 08/13/2020      ALBUMIN 3.9 08/13/2020     Lab Results   Component Value Date    PROTTOTAL 7.4 08/13/2020      Lab Results   Component Value Date    ALKPHOS 27 08/13/2020     Lab Results   Component Value Date    WBC 8.3  08/13/2020      HGB 11.5 08/13/2020       08/13/2020       08/13/2020         IMP/PLAN:   (I10) Benign essential hypertension   Comment: good control  Plan: carvedilol to 12.5 mg bid       (S32.010D) Compression fracture of L1 vertebra with routine healing, subsequent encounter   (M81.0) Osteoporosis without current pathological fracture, unspecified osteoporosis type  Comment: acute fractures of L1 and L3, chronic L4 compression fracture.   Severe kyphosis     Plan: Miacalcin NS, vit D and calcium.  Ambulation with FWW.     (M48.061) Spinal stenosis of lumbar region without neurogenic claudication  (M54.5,  G89.29) Chronic bilateral low back pain without sciatica  Comment: many years of chronic pain and opioid dependence      Plan: regimen has been decreased, now on Dilaudid 2 mg tid.  Will continue as written for now, with bowel regimen  Also on scheduled acetaminophen and topical Aspercreme.      (N32.81) Overactive bladder  Comment: longstanding  Plan: Myrbetriq 50 mg/day.   Has seen Urology and declined other tx  Continue with incontinence products and assist with cares       (I48.0) Paroxysmal atrial fibrillation (H)  Comment: regular today   Plan: carvedilol as above.  She has declined anticoagulation.       (G30.9,  F02.80) Dementia in Alzheimer's disease (H)  Comment: low functional status; pt is guarded and resistant to change, has hoarding and obsessive compulsive tendencies but has declined medications for anxiety.   Plan: [place] support for meds, meals, activity.        Gloria Quezada MD         Sincerely,        Gloria Quezada MD

## 2021-01-15 ENCOUNTER — HEALTH MAINTENANCE LETTER (OUTPATIENT)
Age: 86
End: 2021-01-15

## 2021-01-20 DIAGNOSIS — S32.010D COMPRESSION FRACTURE OF L1 VERTEBRA WITH ROUTINE HEALING, SUBSEQUENT ENCOUNTER: ICD-10-CM

## 2021-01-21 RX ORDER — HYDROMORPHONE HYDROCHLORIDE 2 MG/1
TABLET ORAL
Qty: 90 TABLET | Refills: 0 | Status: SHIPPED | OUTPATIENT
Start: 2021-01-21 | End: 2021-03-02

## 2021-01-24 NOTE — PROGRESS NOTES
Litzy Kenny is a 88 year old female seen January 13, 2021 at [facility] where she has resided for one year (admit 1/2020)  Pt is seen in her apartment with her  Shan present.   She has eaten just a few bites of her breakfast, states she doesn't want anymore.    Continues to note back pain, but no difference in reporting since Dilaudid dose decreased.     She has not been out of the apartment since they moved in.     She had lower extremity stasis ulcers that were slow to heal, required abx last fall for secondary infection.    With excellent care from [facility] they have now finally healed.  Using ammonium lactate or Cetaphil to keep skin moist and intact.    She has urinary incontinence.    By chart review, she has been seen Urology for this most recently in April 2019, started on Myrbetriq    Pt was not interested in other options offered, e.g., Botox   Pt has a past h/o PAF, followed by Cardiology with CHADS-VASc score 4.   She has declined anticoagulation.     Pt was hospitalized in October 2019 for venous stasis ulcer, dementia and impaired mobility. She went to TCU then returned home with her 's care.   She was readmitted to [facility] 12/2019 for altered mental status and new compression fractures at L3 and L1.   She also has severe spinal stenosis lumbar spine, and chronic compression fracture L4.  She has been on narcotics for many years.       PMH:  Cystoid macular edema, right eye  Cataract  Atrial fibrillation /PAF  Dementia, late onset Alzheimer's type  Osteoporosis   Multiple compression fractures  Chronic bilateral LBP without sciatica  Chronic pain /controlled substance agreement signed  HTN  Anemia  H/o SCC and BCC  Venous stasis ulcers LEs, 2019  Lipodermatosclerosis    SH:  [place]  in [place].   They previously lived in a house in [place]; had lived there since 1960.   They have a daughter Nathalie (in [place]), son Reynold (in [place]) and son Ben (in  [place]).   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   Non smoker    ROS: ambulatory with FWW.      SLUMS 9/30  CPT 4.1    Poor appetite, weight 90 lbs  Incontinence  Tinetti 20/28      EXAM:  Very frail, NAD  Temp 97.1  F (36.2  C)   SpO2 94%    VS at today's visit:  /54  HR 78   O2sat 95% on room air.    Severe kyphosis, folded almost in half when standing  Neck supple without adenopathy  Lungs with decreased BS, no rales or wheeze  Heart RRR s1s2 tachy  Abd soft, NT, no distention, +BS  Ext without edema, wounds are healed.     Neuro: STML, limited history, no focal deficits  Psych: guarded, has about 100 olga pins in her hair, obsessive-compulsive    Last Comprehensive Metabolic Panel:  Sodium   Date Value Ref Range Status   08/13/2020 136 136 - 145 mmol/L Final     Potassium   Date Value Ref Range Status   08/13/2020 4.2 3.5 - 5.0 mmol/L Final     Chloride   Date Value Ref Range Status   08/13/2020 101 98 - 107 mmol/L Final     Carbon Dioxide   Date Value Ref Range Status   08/13/2020 24 22 - 31 mmol/L Final     Anion Gap   Date Value Ref Range Status   08/13/2020 11 5 - 18 mmol/L Final     Glucose   Date Value Ref Range Status   08/13/2020 80 70 - 125 mg/dL Final     Urea Nitrogen   Date Value Ref Range Status   08/13/2020 27 8 - 28 mg/dL Final     Creatinine   Date Value Ref Range Status   08/13/2020 0.77 0.60 - 1.10 mg/dL Final     GFR Estimate   Date Value Ref Range Status   08/13/2020 >60 >60 ml/min/1.73m2 Final     Calcium   Date Value Ref Range Status   08/13/2020 9.4 8.5 - 10.5 mg/dL Final     Lab Results   Component Value Date    AST 22 08/13/2020      ALBUMIN 3.9 08/13/2020     Lab Results   Component Value Date    PROTTOTAL 7.4 08/13/2020      Lab Results   Component Value Date    ALKPHOS 27 08/13/2020     Lab Results   Component Value Date    WBC 8.3 08/13/2020      HGB 11.5 08/13/2020       08/13/2020       08/13/2020         IMP/PLAN:   (I10)  Benign essential hypertension   Comment: good control  Plan: carvedilol to 12.5 mg bid       (S32.010D) Compression fracture of L1 vertebra with routine healing, subsequent encounter   (M81.0) Osteoporosis without current pathological fracture, unspecified osteoporosis type  Comment: acute fractures of L1 and L3, chronic L4 compression fracture.   Severe kyphosis     Plan: Miacalcin NS, vit D and calcium.  Ambulation with FWW.     (M48.061) Spinal stenosis of lumbar region without neurogenic claudication  (M54.5,  G89.29) Chronic bilateral low back pain without sciatica  Comment: many years of chronic pain and opioid dependence      Plan: regimen has been decreased, now on Dilaudid 2 mg tid.  Will continue as written for now, with bowel regimen  Also on scheduled acetaminophen and topical Aspercreme.      (N32.81) Overactive bladder  Comment: longstanding  Plan: Myrbetriq 50 mg/day.   Has seen Urology and declined other tx  Continue with incontinence products and assist with cares       (I48.0) Paroxysmal atrial fibrillation (H)  Comment: regular today   Plan: carvedilol as above.  She has declined anticoagulation.       (G30.9,  F02.80) Dementia in Alzheimer's disease (H)  Comment: low functional status; pt is guarded and resistant to change, has hoarding and obsessive compulsive tendencies but has declined medications for anxiety.   Plan: [place] support for meds, meals, activity.        Gloria Quezada MD

## 2021-01-28 DIAGNOSIS — K59.01 SLOW TRANSIT CONSTIPATION: Primary | ICD-10-CM

## 2021-01-28 RX ORDER — SENNOSIDES 8.6 MG
TABLET ORAL
Qty: 30 TABLET | Refills: 97 | Status: SHIPPED | OUTPATIENT
Start: 2021-01-28 | End: 2022-01-01

## 2021-02-08 NOTE — PROGRESS NOTES
"[facility]  [facility] Record Number: [id number]  Place of Service where encounter took place: [facility] ([facility]) [[id number]]  Chief Complaint   Patient presents with     RECHECK       HPI:    Litzy Kenny is a 88 year old (4/10/1932), who is being seen today for an episodic care visit. HPI information obtained from: facility chart records, facility staff, patient report and [facility] chart review.     Notified by staff last week that resident had red feet and complaints of burning with the use of amlactin lotion. They also noted at that time that between her toes had an odor to it. Nystatin has been used for the past few days.    Litzy was visited this morning while she was just waking. Reports that she is tired and it is \"too early\". Her skin feels better on the feet now that \"they have been using the stuff in the tub.\" no longer having burning. She continues to sleep on the couch and says she is sleeping well.     Past Medical and Surgical History reviewed in Epic today.    MEDICATIONS:  Current Outpatient Medications   Medication Sig Dispense Refill     ACETAMINOPHEN EXTRA STRENGTH 500 MG tablet TAKE TWO TABLETS (1000MG) BY MOUTH THREE TIMES DAILY 60 tablet PRN     ASPERCREME LIDOCAINE 4 % Patch APPLY 1 PATCH TOPICALLY TO LEFT THIGH (ON IN THE MORNING, OFF AT NIGHT) 30 patch PRN     calcitonin, salmon, (MIACALCIN) 200 UNIT/ACT nasal spray SPRAY 1 SPRAY INTO ONE NOSTRIL ALTERNATING NOSTRILS DAILY. ALTERNATE NOSTRIL EACH DAY. 3.7 mL 97     calcium carbonate 600 mg-vitamin D 400 units (CALTRATE) 600-400 MG-UNIT per tablet TAKE 1 TABLET BY MOUTH TWICE DAILY 60 tablet PRN     carvedilol (COREG) 12.5 MG tablet TAKE 1 TABLET BY MOUTH TWICE DAILY 56 tablet 97     Cetaphil Moisturizing (CETAPHIL) external lotion Apply topically 2 times daily       Cholecalciferol (VITAMIN D3) 50 MCG (2000 UT) CAPS TAKE TWO CAPSULES (100MCG / 4000 UNITS) BY MOUTH ONCE DAILY 56 capsule PRN     " "HYDROmorphone (DILAUDID) 2 MG tablet TAKE 1 TABLET BY MOUTH THREE TIMES DAILY 90 tablet 0     miconazole with skin protectant (SIGIFREDO ANTIFUNGAL) 2 % CREA cream APPLY TOPICALLY TO BUTTOCKS TWICE DAILY;& AS NEEDED 142 g 97     MYRBETRIQ 50 MG 24 hr tablet TAKE 1 TABLET BY MOUTH ONCE DAILY 30 tablet PRN     senna-docusate (SENOKOT-S/PERICOLACE) 8.6-50 MG tablet Take 1 tablet by mouth daily as needed for constipation       sennosides (SENOKOT) 8.6 MG tablet TAKE 1 TABLET BY MOUTH ONCE DAILY AS NEEDED 30 tablet 97     VITAMIN D3 25 MCG (1000 UT) tablet TAKE TWO TABLETS (2000 UNITS) BY MOUTH ONCE DAILY 60 tablet PRN     REVIEW OF SYSTEMS:  Limited secondary to cognitive impairment     Objective:  /60   Pulse 89   Temp 97.8  F (36.6  C)   Resp 16   Ht 1.499 m (4' 11\")   Wt 40.8 kg (90 lb)   SpO2 94%   BMI 18.18 kg/m    Exam:  GENERAL APPEARANCE:  Alert, in no distress, pleasant, cooperative, frail  EYES: no discharge or mattering on lids or lashes noted  ENT:  moist mucous membranes, hearing acuity intact  NECK: supple, symmetrical  RESP: no respiratory distress, Lung sounds clear, patient is on room air  CV:  Rate regular.  Edema trace in left lower extremity, and none in the right lower extremity.  VASCULAR: warm extremities without open areas.  ABDOMEN: normal bowel sounds, soft, nontender.  M/S:   Gait and station: ambulates with walker and assist, uses wheelchair in the morning, no tenderness or swelling of the joints; able to move all extremities, kyphosis  SKIN:  Inspection and palpation of skin and subcutaneous tissue: area between toes are moist, odorous, no open areas. Skin on left foot is red but not not warm.   NEURO: no facial asymmetry, no speech deficits and able to follow directions, moves all extremities symmetrically  PSYCH:  insight, judgement, and memory impaired affect and mood normal    Labs:   CBC RESULTS:   Recent Labs   Lab Test 08/13/20   WBC 8.3   RBC 3.49*   HGB 11.5*   HCT 35.8 "   *   MCH 33.0   MCHC 32.1   RDW 13.1          Last Basic Metabolic Panel:  Recent Labs   Lab Test 08/13/20 03/05/20    137   POTASSIUM 4.2  --    CHLORIDE 101  --    KEYANNA 9.4  --    CO2 24  --    BUN 27  --    CR 0.77  --    GLC 80  --        Liver Function Studies -   Recent Labs   Lab Test 08/13/20   PROTTOTAL 7.4   ALBUMIN 3.9   BILITOTAL 0.6   ALKPHOS 27*   AST 22   ALT 11       ASSESSMENT/PLAN:  Impaired skin integrity  Has redness on her left foot from using the amlactin lotion. The redness appears to be more of an irritation rather than an infectious process. It has improved since using the vanicream so will continue with that. I was able to clean between her toes today and there was an odor but no open areas or concern for a fungal infection at this time but she is certainly at risk for this given how much moisture there was.  --continue with the use of vanicream  --clean daily between toes daily. Allow to dry then put nystatin powder between toes.   --will continue to monitor skin integrity.      Paroxysmal atrial fibrillation (H)   essential HTN  Comment: HR and blood pressure controlled. Based on JNC-8 goals,  patients age of 88 year old, no presence of diabetes or CKD, and goals of care goal BP is <150/90 mm Hg. Patient is stable with current plan of care and routine assessment..  --continue with carvedilol to 12.5 mg BID   --Continue to monitor blood pressure and heart rate, adjust medications as needed.    Spinal stenosis of lumbar region without neurogenic claudication   Chronic bilateral low back pain without sciatica   Chronic pain syndrome   Patient has long history of chronic back pain. Imaging with L3 and L1 compression fracture sustained from fall last year.  Previousy taking 40 mg oxycodone per day which was changed to dilaudid due to altered mental status prior to moving to the [place]. We had discussed tapering off the dilaudid in the past but they have not agreed with  this due to the chronic pain. She has significant deformities in her back.   --continue tylenol 1000 mg three times a day  --continue aspercreame/lidoderm patch once a day  --dilaudid 2 mg TIDbetween all toes on right and left foot daily. allow to dry then put nystatin powder in between toeent funal gwth  Electronically signed by:  TASHIA Caro CNP

## 2021-02-09 VITALS
TEMPERATURE: 97.8 F | DIASTOLIC BLOOD PRESSURE: 60 MMHG | BODY MASS INDEX: 18.14 KG/M2 | WEIGHT: 90 LBS | SYSTOLIC BLOOD PRESSURE: 113 MMHG | HEART RATE: 89 BPM | HEIGHT: 59 IN | RESPIRATION RATE: 16 BRPM | OXYGEN SATURATION: 94 %

## 2021-02-09 ASSESSMENT — MIFFLIN-ST. JEOR: SCORE: 743.87

## 2021-02-10 ENCOUNTER — ASSISTED LIVING VISIT (OUTPATIENT)
Dept: GERIATRICS | Facility: CLINIC | Age: 86
End: 2021-02-10
Payer: MEDICARE

## 2021-02-10 DIAGNOSIS — I48.0 PAROXYSMAL ATRIAL FIBRILLATION (H): ICD-10-CM

## 2021-02-10 DIAGNOSIS — I10 ESSENTIAL HYPERTENSION: ICD-10-CM

## 2021-02-10 DIAGNOSIS — G89.4 CHRONIC PAIN DISORDER: ICD-10-CM

## 2021-02-10 DIAGNOSIS — M48.061 SPINAL STENOSIS OF LUMBAR REGION WITHOUT NEUROGENIC CLAUDICATION: ICD-10-CM

## 2021-02-10 DIAGNOSIS — R23.9 IMPAIRED SKIN INTEGRITY: Primary | ICD-10-CM

## 2021-02-10 NOTE — LETTER
"    2/10/2021        RE: Litzy Kenny  C/o Ben Kenny  [address] [facility] [address]        [facility]  [facility] Record Number: [id number]  Place of Service where encounter took place: [facility] ([facility]) [[id number]]  Chief Complaint   Patient presents with     RECHECK       HPI:    Litzy Kenny is a 88 year old (4/10/1932), who is being seen today for an episodic care visit. HPI information obtained from: facility chart records, facility staff, patient report and [facility] chart review.     Notified by staff last week that resident had red feet and complaints of burning with the use of amlactin lotion. They also noted at that time that between her toes had an odor to it. Nystatin has been used for the past few days.    Litzy was visited this morning while she was just waking. Reports that she is tired and it is \"too early\". Her skin feels better on the feet now that \"they have been using the stuff in the tub.\" no longer having burning. She continues to sleep on the couch and says she is sleeping well.     Past Medical and Surgical History reviewed in Epic today.    MEDICATIONS:  Current Outpatient Medications   Medication Sig Dispense Refill     ACETAMINOPHEN EXTRA STRENGTH 500 MG tablet TAKE TWO TABLETS (1000MG) BY MOUTH THREE TIMES DAILY 60 tablet PRN     ASPERCREME LIDOCAINE 4 % Patch APPLY 1 PATCH TOPICALLY TO LEFT THIGH (ON IN THE MORNING, OFF AT NIGHT) 30 patch PRN     calcitonin, salmon, (MIACALCIN) 200 UNIT/ACT nasal spray SPRAY 1 SPRAY INTO ONE NOSTRIL ALTERNATING NOSTRILS DAILY. ALTERNATE NOSTRIL EACH DAY. 3.7 mL 97     calcium carbonate 600 mg-vitamin D 400 units (CALTRATE) 600-400 MG-UNIT per tablet TAKE 1 TABLET BY MOUTH TWICE DAILY 60 tablet PRN     carvedilol (COREG) 12.5 MG tablet TAKE 1 TABLET BY MOUTH TWICE DAILY 56 tablet 97     Cetaphil Moisturizing (CETAPHIL) external lotion Apply topically 2 times daily       Cholecalciferol (VITAMIN D3) " "50 MCG (2000 UT) CAPS TAKE TWO CAPSULES (100MCG / 4000 UNITS) BY MOUTH ONCE DAILY 56 capsule PRN     HYDROmorphone (DILAUDID) 2 MG tablet TAKE 1 TABLET BY MOUTH THREE TIMES DAILY 90 tablet 0     miconazole with skin protectant (SIGIFREDO ANTIFUNGAL) 2 % CREA cream APPLY TOPICALLY TO BUTTOCKS TWICE DAILY;& AS NEEDED 142 g 97     MYRBETRIQ 50 MG 24 hr tablet TAKE 1 TABLET BY MOUTH ONCE DAILY 30 tablet PRN     senna-docusate (SENOKOT-S/PERICOLACE) 8.6-50 MG tablet Take 1 tablet by mouth daily as needed for constipation       sennosides (SENOKOT) 8.6 MG tablet TAKE 1 TABLET BY MOUTH ONCE DAILY AS NEEDED 30 tablet 97     VITAMIN D3 25 MCG (1000 UT) tablet TAKE TWO TABLETS (2000 UNITS) BY MOUTH ONCE DAILY 60 tablet PRN     REVIEW OF SYSTEMS:  Limited secondary to cognitive impairment     Objective:  /60   Pulse 89   Temp 97.8  F (36.6  C)   Resp 16   Ht 1.499 m (4' 11\")   Wt 40.8 kg (90 lb)   SpO2 94%   BMI 18.18 kg/m    Exam:  GENERAL APPEARANCE:  Alert, in no distress, pleasant, cooperative, frail  EYES: no discharge or mattering on lids or lashes noted  ENT:  moist mucous membranes, hearing acuity intact  NECK: supple, symmetrical  RESP: no respiratory distress, Lung sounds clear, patient is on room air  CV:  Rate regular.  Edema trace in left lower extremity, and none in the right lower extremity.  VASCULAR: warm extremities without open areas.  ABDOMEN: normal bowel sounds, soft, nontender.  M/S:   Gait and station: ambulates with walker and assist, uses wheelchair in the morning, no tenderness or swelling of the joints; able to move all extremities, kyphosis  SKIN:  Inspection and palpation of skin and subcutaneous tissue: area between toes are moist, odorous, no open areas. Skin on left foot is red but not not warm.   NEURO: no facial asymmetry, no speech deficits and able to follow directions, moves all extremities symmetrically  PSYCH:  insight, judgement, and memory impaired affect and mood " normal    Labs:   CBC RESULTS:   Recent Labs   Lab Test 08/13/20   WBC 8.3   RBC 3.49*   HGB 11.5*   HCT 35.8   *   MCH 33.0   MCHC 32.1   RDW 13.1          Last Basic Metabolic Panel:  Recent Labs   Lab Test 08/13/20 03/05/20    137   POTASSIUM 4.2  --    CHLORIDE 101  --    KEYANNA 9.4  --    CO2 24  --    BUN 27  --    CR 0.77  --    GLC 80  --        Liver Function Studies -   Recent Labs   Lab Test 08/13/20   PROTTOTAL 7.4   ALBUMIN 3.9   BILITOTAL 0.6   ALKPHOS 27*   AST 22   ALT 11       ASSESSMENT/PLAN:  Impaired skin integrity  Has redness on her left foot from using the amlactin lotion. The redness appears to be more of an irritation rather than an infectious process. It has improved since using the vanicream so will continue with that. I was able to clean between her toes today and there was an odor but no open areas or concern for a fungal infection at this time but she is certainly at risk for this given how much moisture there was.  --continue with the use of vanicream  --clean daily between toes daily. Allow to dry then put nystatin powder between toes.   --will continue to monitor skin integrity.      Paroxysmal atrial fibrillation (H)   essential HTN  Comment: HR and blood pressure controlled. Based on JNC-8 goals,  patients age of 88 year old, no presence of diabetes or CKD, and goals of care goal BP is <150/90 mm Hg. Patient is stable with current plan of care and routine assessment..  --continue with carvedilol to 12.5 mg BID   --Continue to monitor blood pressure and heart rate, adjust medications as needed.    Spinal stenosis of lumbar region without neurogenic claudication   Chronic bilateral low back pain without sciatica   Chronic pain syndrome   Patient has long history of chronic back pain. Imaging with L3 and L1 compression fracture sustained from fall last year.  Previousy taking 40 mg oxycodone per day which was changed to dilaudid due to altered mental status prior to  moving to the [place]. We had discussed tapering off the dilaudid in the past but they have not agreed with this due to the chronic pain. She has significant deformities in her back.   --continue tylenol 1000 mg three times a day  --continue aspercreame/lidoderm patch once a day  --dilaudid 2 mg TIDbetween all toes on right and left foot daily. allow to dry then put nystatin powder in between toeent funal gwth  Electronically signed by:  TASHIA Caro CNP             Sincerely,        TASHIA Caro CNP

## 2021-02-11 ENCOUNTER — RECORDS - HEALTHEAST (OUTPATIENT)
Dept: LAB | Facility: CLINIC | Age: 86
End: 2021-02-11

## 2021-02-11 LAB
SARS-COV-2 PCR COMMENT: NORMAL
SARS-COV-2 RNA SPEC QL NAA+PROBE: NEGATIVE
SARS-COV-2 VIRUS SPECIMEN SOURCE: NORMAL

## 2021-02-23 VITALS — BODY MASS INDEX: 18.14 KG/M2 | HEIGHT: 59 IN | WEIGHT: 90 LBS | TEMPERATURE: 97.6 F | OXYGEN SATURATION: 95 %

## 2021-02-23 ASSESSMENT — MIFFLIN-ST. JEOR: SCORE: 743.87

## 2021-02-23 NOTE — PROGRESS NOTES
"[facility]  [place] Medical Record Number: [id number]  Place of Service where encounter took place: [facility] ([facility]) [[id number]]  Chief Complaint   Patient presents with     RECHECK       HPI:    Lizty Kenny is a 88 year old (4/10/1932), who is being seen today for an episodic care visit. HPI information obtained from: facility chart records, facility staff, patient report and [facility] chart review.      was visited today while she was sitting on the couch. Her breakfast and pills are on the chair, \"its too early.\" She has chronic back pain which is bothersome, reports pain to her left foot when touched. She has not noted any drainage coming from the foot.     Past Medical and Surgical History reviewed in Epic today.    MEDICATIONS:  Current Outpatient Medications   Medication Sig Dispense Refill     ACETAMINOPHEN EXTRA STRENGTH 500 MG tablet TAKE TWO TABLETS (1000MG) BY MOUTH THREE TIMES DAILY 60 tablet PRN     ASPERCREME LIDOCAINE 4 % Patch APPLY 1 PATCH TOPICALLY TO LEFT THIGH (ON IN THE MORNING, OFF AT NIGHT) 30 patch PRN     calcitonin, salmon, (MIACALCIN) 200 UNIT/ACT nasal spray SPRAY 1 SPRAY INTO ONE NOSTRIL ALTERNATING NOSTRILS DAILY. ALTERNATE NOSTRIL EACH DAY. 3.7 mL 97     calcium carbonate 600 mg-vitamin D 400 units (CALTRATE) 600-400 MG-UNIT per tablet TAKE 1 TABLET BY MOUTH TWICE DAILY 60 tablet PRN     carvedilol (COREG) 12.5 MG tablet TAKE 1 TABLET BY MOUTH TWICE DAILY 56 tablet 97     Cetaphil Moisturizing (CETAPHIL) external lotion Apply topically 2 times daily       Cholecalciferol (VITAMIN D3) 50 MCG (2000 UT) CAPS TAKE TWO CAPSULES (100MCG / 4000 UNITS) BY MOUTH ONCE DAILY 56 capsule PRN     HYDROmorphone (DILAUDID) 2 MG tablet TAKE 1 TABLET BY MOUTH THREE TIMES DAILY 90 tablet 0     miconazole with skin protectant (SIGIFREDO ANTIFUNGAL) 2 % CREA cream APPLY TOPICALLY TO BUTTOCKS TWICE DAILY;& AS NEEDED 142 g 97     MYRBETRIQ 50 MG 24 hr tablet TAKE 1 " "TABLET BY MOUTH ONCE DAILY 30 tablet PRN     senna-docusate (SENOKOT-S/PERICOLACE) 8.6-50 MG tablet Take 1 tablet by mouth daily as needed for constipation       sennosides (SENOKOT) 8.6 MG tablet TAKE 1 TABLET BY MOUTH ONCE DAILY AS NEEDED 30 tablet 97     VITAMIN D3 25 MCG (1000 UT) tablet TAKE TWO TABLETS (2000 UNITS) BY MOUTH ONCE DAILY 60 tablet PRN     REVIEW OF SYSTEMS:  Limited secondary to cognitive impairment     Objective:  Temp 97.6  F (36.4  C)   Ht 1.499 m (4' 11\")   Wt 40.8 kg (90 lb)   SpO2 95%   BMI 18.18 kg/m    Exam:  GENERAL APPEARANCE:  Alert, in no distress, pleasant, cooperative, frail  RESP: no respiratory distress, Lung sounds clear, patient is on room air  CV:  Rate regular.  Edema trace in left lower extremity, and none in the right lower extremity.  VASCULAR: warm extremities without open areas.  M/S:   Gait and station: ambulates with walker and assist, uses wheelchair in the morning, no tenderness or swelling of the joints; able to move all extremities, kyphosis  SKIN:  Inspection and palpation of skin and subcutaneous tissue: left foot with redness (improved from last week) no open areas or drainage present today.   NEURO: no facial asymmetry, no speech deficits and able to follow directions, moves all extremities symmetrically  PSYCH:  insight, judgement, and memory impaired affect and mood normal    Labs:   CBC RESULTS:   Recent Labs   Lab Test 08/13/20   WBC 8.3   RBC 3.49*   HGB 11.5*   HCT 35.8   *   MCH 33.0   MCHC 32.1   RDW 13.1          Last Basic Metabolic Panel:  Recent Labs   Lab Test 08/13/20 03/05/20    137   POTASSIUM 4.2  --    CHLORIDE 101  --    KEYANNA 9.4  --    CO2 24  --    BUN 27  --    CR 0.77  --    GLC 80  --        Liver Function Studies -   Recent Labs   Lab Test 08/13/20   PROTTOTAL 7.4   ALBUMIN 3.9   BILITOTAL 0.6   ALKPHOS 27*   AST 22   ALT 11       ASSESSMENT/PLAN:  Venous dermatitis  Had redness on her left foot from using the " amlactin lotion. Connected with the wound care RN whom has worked with Litzy recently and he recommended steroid cream to the area BID as this was likely a flair of her dermatitis. She has been getting the steroid cream on her feet for the past week and the irritation/redness has improved but still present so will continue with the steroid cream for another week.   --triamcinolone cream BID to areas of redness x 1 more week.   --continue with the use of vanicream  --clean daily between toes daily. Allow to dry then put nystatin powder between toes.   --will continue to monitor skin integrity.     Spinal stenosis of lumbar region without neurogenic claudication   Chronic bilateral low back pain without sciatica   Chronic pain syndrome   Patient has long history of chronic back pain. Imaging with L3 and L1 compression fracture sustained from fall last year.  Previousy taking 40 mg oxycodone per day which was changed to dilaudid due to altered mental status prior to moving to the [place]. We had discussed tapering off the dilaudid in the past but they have not agreed with this due to the chronic pain. She has significant deformities in her back, head always downward.   --continue tylenol 1000 mg three times a day  --continue aspercreame/lidoderm patch once a day  --dilaudid 2 mg TIDbetween all toes on right and left foot daily. allow to dry then put nystatin powder in between toeent maritza smith  Electronically signed by:  TASHIA Caro CNP

## 2021-02-24 ENCOUNTER — ASSISTED LIVING VISIT (OUTPATIENT)
Dept: GERIATRICS | Facility: CLINIC | Age: 86
End: 2021-02-24
Payer: MEDICARE

## 2021-02-24 DIAGNOSIS — M48.061 SPINAL STENOSIS OF LUMBAR REGION WITHOUT NEUROGENIC CLAUDICATION: ICD-10-CM

## 2021-02-24 DIAGNOSIS — I87.2 VENOUS STASIS DERMATITIS OF LEFT LOWER EXTREMITY: Primary | ICD-10-CM

## 2021-02-24 DIAGNOSIS — G89.4 CHRONIC PAIN DISORDER: ICD-10-CM

## 2021-02-24 NOTE — LETTER
"    2/24/2021        RE: Litzy Kenny  C/o Ben Kenny  [address] [address]        [facility]  [place] Medical Record Number: [id number]  Place of Service where encounter took place: [facility] ([facility]) [[id number]]  Chief Complaint   Patient presents with     RECHECK       HPI:    Litzy Kenny is a 88 year old (4/10/1932), who is being seen today for an episodic care visit. HPI information obtained from: facility chart records, facility staff, patient report and [facility] chart review.      was visited today while she was sitting on the couch. Her breakfast and pills are on the chair, \"its too early.\" She has chronic back pain which is bothersome, reports pain to her left foot when touched. She has not noted any drainage coming from the foot.     Past Medical and Surgical History reviewed in Epic today.    MEDICATIONS:  Current Outpatient Medications   Medication Sig Dispense Refill     ACETAMINOPHEN EXTRA STRENGTH 500 MG tablet TAKE TWO TABLETS (1000MG) BY MOUTH THREE TIMES DAILY 60 tablet PRN     ASPERCREME LIDOCAINE 4 % Patch APPLY 1 PATCH TOPICALLY TO LEFT THIGH (ON IN THE MORNING, OFF AT NIGHT) 30 patch PRN     calcitonin, salmon, (MIACALCIN) 200 UNIT/ACT nasal spray SPRAY 1 SPRAY INTO ONE NOSTRIL ALTERNATING NOSTRILS DAILY. ALTERNATE NOSTRIL EACH DAY. 3.7 mL 97     calcium carbonate 600 mg-vitamin D 400 units (CALTRATE) 600-400 MG-UNIT per tablet TAKE 1 TABLET BY MOUTH TWICE DAILY 60 tablet PRN     carvedilol (COREG) 12.5 MG tablet TAKE 1 TABLET BY MOUTH TWICE DAILY 56 tablet 97     Cetaphil Moisturizing (CETAPHIL) external lotion Apply topically 2 times daily       Cholecalciferol (VITAMIN D3) 50 MCG (2000 UT) CAPS TAKE TWO CAPSULES (100MCG / 4000 UNITS) BY MOUTH ONCE DAILY 56 capsule PRN     HYDROmorphone (DILAUDID) 2 MG tablet TAKE 1 TABLET BY MOUTH THREE TIMES DAILY 90 tablet 0     miconazole with skin protectant (SIGIFREDO ANTIFUNGAL) 2 % CREA " "cream APPLY TOPICALLY TO BUTTOCKS TWICE DAILY;& AS NEEDED 142 g 97     MYRBETRIQ 50 MG 24 hr tablet TAKE 1 TABLET BY MOUTH ONCE DAILY 30 tablet PRN     senna-docusate (SENOKOT-S/PERICOLACE) 8.6-50 MG tablet Take 1 tablet by mouth daily as needed for constipation       sennosides (SENOKOT) 8.6 MG tablet TAKE 1 TABLET BY MOUTH ONCE DAILY AS NEEDED 30 tablet 97     VITAMIN D3 25 MCG (1000 UT) tablet TAKE TWO TABLETS (2000 UNITS) BY MOUTH ONCE DAILY 60 tablet PRN     REVIEW OF SYSTEMS:  Limited secondary to cognitive impairment     Objective:  Temp 97.6  F (36.4  C)   Ht 1.499 m (4' 11\")   Wt 40.8 kg (90 lb)   SpO2 95%   BMI 18.18 kg/m    Exam:  GENERAL APPEARANCE:  Alert, in no distress, pleasant, cooperative, frail  RESP: no respiratory distress, Lung sounds clear, patient is on room air  CV:  Rate regular.  Edema trace in left lower extremity, and none in the right lower extremity.  VASCULAR: warm extremities without open areas.  M/S:   Gait and station: ambulates with walker and assist, uses wheelchair in the morning, no tenderness or swelling of the joints; able to move all extremities, kyphosis  SKIN:  Inspection and palpation of skin and subcutaneous tissue: left foot with redness (improved from last week) no open areas or drainage present today.   NEURO: no facial asymmetry, no speech deficits and able to follow directions, moves all extremities symmetrically  PSYCH:  insight, judgement, and memory impaired affect and mood normal    Labs:   CBC RESULTS:   Recent Labs   Lab Test 08/13/20   WBC 8.3   RBC 3.49*   HGB 11.5*   HCT 35.8   *   MCH 33.0   MCHC 32.1   RDW 13.1          Last Basic Metabolic Panel:  Recent Labs   Lab Test 08/13/20 03/05/20    137   POTASSIUM 4.2  --    CHLORIDE 101  --    KEYANNA 9.4  --    CO2 24  --    BUN 27  --    CR 0.77  --    GLC 80  --        Liver Function Studies -   Recent Labs   Lab Test 08/13/20   PROTTOTAL 7.4   ALBUMIN 3.9   BILITOTAL 0.6   ALKPHOS 27* "   AST 22   ALT 11       ASSESSMENT/PLAN:  Venous dermatitis  Had redness on her left foot from using the amlactin lotion. Connected with the wound care RN whom has worked with Litzy recently and he recommended steroid cream to the area BID as this was likely a flair of her dermatitis. She has been getting the steroid cream on her feet for the past week and the irritation/redness has improved but still present so will continue with the steroid cream for another week.   --triamcinolone cream BID to areas of redness x 1 more week.   --continue with the use of vanicream  --clean daily between toes daily. Allow to dry then put nystatin powder between toes.   --will continue to monitor skin integrity.     Spinal stenosis of lumbar region without neurogenic claudication   Chronic bilateral low back pain without sciatica   Chronic pain syndrome   Patient has long history of chronic back pain. Imaging with L3 and L1 compression fracture sustained from fall last year.  Previousy taking 40 mg oxycodone per day which was changed to dilaudid due to altered mental status prior to moving to the [place]. We had discussed tapering off the dilaudid in the past but they have not agreed with this due to the chronic pain. She has significant deformities in her back, head always downward.   --continue tylenol 1000 mg three times a day  --continue aspercreame/lidoderm patch once a day  --dilaudid 2 mg TIDbetween all toes on right and left foot daily. allow to dry then put nystatin powder in between toeent funal luis  Electronically signed by:  TASHIA Caro CNP                 Sincerely,        TASHIA Caro CNP

## 2021-03-01 DIAGNOSIS — S32.010D COMPRESSION FRACTURE OF L1 VERTEBRA WITH ROUTINE HEALING, SUBSEQUENT ENCOUNTER: ICD-10-CM

## 2021-03-02 RX ORDER — HYDROMORPHONE HYDROCHLORIDE 2 MG/1
TABLET ORAL
Qty: 90 TABLET | Refills: 0 | Status: SHIPPED | OUTPATIENT
Start: 2021-03-02 | End: 2021-03-25

## 2021-03-09 DIAGNOSIS — M54.50 CHRONIC BILATERAL LOW BACK PAIN WITHOUT SCIATICA: ICD-10-CM

## 2021-03-09 DIAGNOSIS — G89.29 CHRONIC BILATERAL LOW BACK PAIN WITHOUT SCIATICA: ICD-10-CM

## 2021-03-09 DIAGNOSIS — N32.81 OVERACTIVE BLADDER: ICD-10-CM

## 2021-03-10 RX ORDER — PSEUDOEPHED/ACETAMINOPH/DIPHEN 30MG-500MG
TABLET ORAL
Qty: 168 TABLET | Refills: 97 | Status: SHIPPED | OUTPATIENT
Start: 2021-03-10 | End: 2022-04-11

## 2021-03-10 RX ORDER — MIRABEGRON 50 MG/1
TABLET, FILM COATED, EXTENDED RELEASE ORAL
Qty: 28 TABLET | Refills: 97 | Status: SHIPPED | OUTPATIENT
Start: 2021-03-10 | End: 2022-04-11

## 2021-03-10 RX ORDER — CALCIUM CARBONATE/VITAMIN D3 600 MG-10
TABLET ORAL
Qty: 56 TABLET | Refills: 97 | Status: SHIPPED | OUTPATIENT
Start: 2021-03-10 | End: 2022-04-11

## 2021-03-24 DIAGNOSIS — S32.010D COMPRESSION FRACTURE OF L1 VERTEBRA WITH ROUTINE HEALING, SUBSEQUENT ENCOUNTER: ICD-10-CM

## 2021-03-25 RX ORDER — HYDROMORPHONE HYDROCHLORIDE 2 MG/1
TABLET ORAL
Qty: 90 TABLET | Refills: 0 | Status: SHIPPED | OUTPATIENT
Start: 2021-03-25 | End: 2021-04-22

## 2021-04-02 DIAGNOSIS — S32.010D COMPRESSION FRACTURE OF L1 VERTEBRA WITH ROUTINE HEALING, SUBSEQUENT ENCOUNTER: ICD-10-CM

## 2021-04-03 RX ORDER — CHOLECALCIFEROL (VITAMIN D3) 50 MCG
TABLET ORAL
Qty: 56 TABLET | Status: SHIPPED | OUTPATIENT
Start: 2021-04-03 | End: 2022-04-11

## 2021-04-21 DIAGNOSIS — S32.010D COMPRESSION FRACTURE OF L1 VERTEBRA WITH ROUTINE HEALING, SUBSEQUENT ENCOUNTER: ICD-10-CM

## 2021-04-22 ENCOUNTER — RECORDS - HEALTHEAST (OUTPATIENT)
Dept: LAB | Facility: CLINIC | Age: 86
End: 2021-04-22

## 2021-04-22 RX ORDER — HYDROMORPHONE HYDROCHLORIDE 2 MG/1
TABLET ORAL
Qty: 90 TABLET | Refills: 0 | Status: SHIPPED | OUTPATIENT
Start: 2021-04-22 | End: 2021-05-26

## 2021-04-28 ENCOUNTER — ASSISTED LIVING VISIT (OUTPATIENT)
Dept: GERIATRICS | Facility: CLINIC | Age: 86
End: 2021-04-28
Payer: MEDICARE

## 2021-04-28 VITALS — HEIGHT: 59 IN | TEMPERATURE: 98.2 F | WEIGHT: 90 LBS | BODY MASS INDEX: 18.14 KG/M2 | OXYGEN SATURATION: 96 %

## 2021-04-28 DIAGNOSIS — G89.4 CHRONIC PAIN DISORDER: ICD-10-CM

## 2021-04-28 DIAGNOSIS — I87.2 VENOUS STASIS DERMATITIS OF LEFT LOWER EXTREMITY: Primary | ICD-10-CM

## 2021-04-28 DIAGNOSIS — M48.061 SPINAL STENOSIS OF LUMBAR REGION WITHOUT NEUROGENIC CLAUDICATION: ICD-10-CM

## 2021-04-28 DIAGNOSIS — I10 ESSENTIAL HYPERTENSION: ICD-10-CM

## 2021-04-28 DIAGNOSIS — I48.0 PAROXYSMAL ATRIAL FIBRILLATION (H): ICD-10-CM

## 2021-04-28 RX ORDER — NYSTATIN 100000 [USP'U]/G
POWDER TOPICAL EVERY MORNING
COMMUNITY
End: 2021-09-23

## 2021-04-28 RX ORDER — BISACODYL 10 MG
10 SUPPOSITORY, RECTAL RECTAL DAILY PRN
COMMUNITY
End: 2021-10-15

## 2021-04-28 ASSESSMENT — MIFFLIN-ST. JEOR: SCORE: 738.87

## 2021-04-28 NOTE — PROGRESS NOTES
[facility]  [place] Medical Record Number: [id number]  Place of Service where encounter took place: [facility] ([facility]) [[id number]]  Chief Complaint   Patient presents with     RECHECK       HPI:    Oleg Kenny is a 88 year old (4/10/1932), who is being seen today for an episodic care visit. HPI information obtained from: facility chart records, facility staff, patient report and [facility] chart review.      was visited today initially early this afternoon while she was using the restroom she asked for me to return as she was feeling tired. Returned later, oleg was sitting on the couch. She is concerned about the appearance of the left foot. She believes the nystatin powder may be causing the redness. She has not pain. Shan believes the redness may be improving slightly after using the steroid cream for two days. They are both concerned about the weeping from the foot. We discussed the importance of elevating the legs throughout the day to prevent any swelling, and the elevation will also help the vascular dermatitis. She denies shortness of breath or cough. She continues to sleep on the couch with legs down.     Past Medical and Surgical History reviewed in Epic today.    MEDICATIONS:  Current Outpatient Medications   Medication Sig Dispense Refill     ACETAMINOPHEN EXTRA STRENGTH 500 MG tablet TAKE TWO TABLETS (1000MG) BY MOUTH THREE TIMES DAILY 168 tablet 97     ASPERCREME LIDOCAINE 4 % Patch APPLY 1 PATCH TOPICALLY TO LEFT THIGH (ON IN THE MORNING, OFF AT NIGHT) 30 patch PRN     bisacodyl (DULCOLAX) 10 MG suppository Place 10 mg rectally daily as needed for constipation       calcitonin, salmon, (MIACALCIN) 200 UNIT/ACT nasal spray SPRAY 1 SPRAY INTO ONE NOSTRIL ALTERNATING NOSTRILS DAILY. ALTERNATE NOSTRIL EACH DAY. 3.7 mL 97     CALCIUM + VITAMIN D3 600-10 MG-MCG TABS TAKE 1 TABLET BY MOUTH TWICE DAILY 56 tablet 97     carvedilol (COREG) 12.5 MG tablet TAKE 1 TABLET BY  "MOUTH TWICE DAILY 56 tablet 97     Cetaphil Moisturizing (CETAPHIL) external lotion Apply topically 2 times daily       Cholecalciferol (VITAMIN D3) 50 MCG (2000 UT) TABS TAKE TWO TABLETS (100MCG / 4000 UNITS) BY MOUTH ONCE DAILY 56 tablet PRN     HYDROmorphone (DILAUDID) 2 MG tablet TAKE 1 TABLET BY MOUTH THREE TIMES DAILY 90 tablet 0     miconazole with skin protectant (SIGIFREDO ANTIFUNGAL) 2 % CREA cream APPLY TOPICALLY TO BUTTOCKS TWICE DAILY;& AS NEEDED 142 g 97     MYRBETRIQ 50 MG 24 hr tablet TAKE 1 TABLET BY MOUTH ONCE DAILY 28 tablet 97     nystatin (NYSTOP) 913214 UNIT/GM external powder Apply topically every morning Apply topically to feet       senna-docusate (SENOKOT-S/PERICOLACE) 8.6-50 MG tablet Take 1 tablet by mouth daily as needed for constipation       sennosides (SENOKOT) 8.6 MG tablet TAKE 1 TABLET BY MOUTH ONCE DAILY AS NEEDED 30 tablet 97     VITAMIN D3 25 MCG (1000 UT) tablet TAKE TWO TABLETS (2000 UNITS) BY MOUTH ONCE DAILY 60 tablet PRN     REVIEW OF SYSTEMS:  Limited secondary to cognitive impairment     Objective:  Temp 98.2  F (36.8  C)   Ht 1.499 m (4' 11\")   Wt 40.8 kg (90 lb)   SpO2 96%   BMI 18.18 kg/m    Exam:  GENERAL APPEARANCE:  Alert, in no distress, pleasant, cooperative, frail  RESP: no respiratory distress, Lung sounds clear, patient is on room air  CV:  Rate regular.  Edema trace in left lower extremity, and none in the right lower extremity.  VASCULAR: warm extremities without open areas.  M/S:   Gait and station: ambulates with walker and assist, uses wheelchair in the morning, no tenderness or swelling of the joints; able to move all extremities, kyphosis  SKIN:  Inspection and palpation of skin and subcutaneous tissue: left foot with redness (improved from last week) no open areas or drainage present today.   NEURO: no facial asymmetry, no speech deficits and able to follow directions, moves all extremities symmetrically  PSYCH:  insight, judgement, and memory impaired " affect and mood normal    Labs:   CBC RESULTS:   Recent Labs   Lab Test 08/13/20   WBC 8.3   RBC 3.49*   HGB 11.5*   HCT 35.8   *   MCH 33.0   MCHC 32.1   RDW 13.1          Last Basic Metabolic Panel:  Recent Labs   Lab Test 08/13/20 03/05/20    137   POTASSIUM 4.2  --    CHLORIDE 101  --    KEYANNA 9.4  --    CO2 24  --    BUN 27  --    CR 0.77  --    GLC 80  --        Liver Function Studies -   Recent Labs   Lab Test 08/13/20   PROTTOTAL 7.4   ALBUMIN 3.9   BILITOTAL 0.6   ALKPHOS 27*   AST 22   ALT 11       ASSESSMENT/PLAN:  Venous dermatitis  Had another flare last week and it was difficult to determine if it was fungal and dermatitis together so mycolog was ordered but not covered by insurance therefore we started treatment with just triamcinolone which she has been getting for the past two days and the redness has toned down so will continue with that for now.   --triamcinolone cream BID to areas of redness x 5 more days  --will reassess next Monday and if ongoing redness, may need to continue with one more week of triamcinolone.   --continue with the use of vanicream  --clean daily between toes daily. Allow to dry then put nystatin powder between toes.   --will continue to monitor skin integrity.     Hypertension  Paroxysmal Atrial fibrillation  BP and HR controlled today.   --continue with carvedilol 12.5 mg PO BID  --Continue to monitor blood pressure and heart rate, adjust medications as needed.    Spinal stenosis of lumbar region without neurogenic claudication   Chronic bilateral low back pain without sciatica   Chronic pain syndrome   Patient has long history of chronic back pain. Imaging with L3 and L1 compression fracture sustained from fall last year.  Previousy taking 40 mg oxycodone per day which was changed to dilaudid due to altered mental status prior to moving to the [place]. We had discussed tapering off the dilaudid in the past but they have not agreed with this due to the  chronic pain. I would recommend methadone for pain control and will discuss with Litzy and Shan. She has significant deformities in her back, head always downward.   --continue tylenol 1000 mg three times a day  --continue aspercreame/lidoderm patch once a day  --dilaudid 2 mg TID    een all toes on right and left foot daily. allow to dry then put nystatin powder in b  Electronically signed by:  TASHIA Caro CNP

## 2021-04-28 NOTE — LETTER
4/28/2021        RE: Oleg Kenny  C/o Ben Kenny  [address] [facility] [address]        [facility]  [place] Medical Record Number: [id number]  Place of Service where encounter took place: [facility] ([facility]) [[id number]]  Chief Complaint   Patient presents with     RECHECK       HPI:    Oleg Kenny is a 88 year old (4/10/1932), who is being seen today for an episodic care visit. HPI information obtained from: facility chart records, facility staff, patient report and [facility] chart review.      was visited today initially early this afternoon while she was using the restroom she asked for me to return as she was feeling tired. Returned later, oleg was sitting on the couch. She is concerned about the appearance of the left foot. She believes the nystatin powder may be causing the redness. She has not pain. Shan believes the redness may be improving slightly after using the steroid cream for two days. They are both concerned about the weeping from the foot. We discussed the importance of elevating the legs throughout the day to prevent any swelling, and the elevation will also help the vascular dermatitis. She denies shortness of breath or cough. She continues to sleep on the couch with legs down.     Past Medical and Surgical History reviewed in Epic today.    MEDICATIONS:  Current Outpatient Medications   Medication Sig Dispense Refill     ACETAMINOPHEN EXTRA STRENGTH 500 MG tablet TAKE TWO TABLETS (1000MG) BY MOUTH THREE TIMES DAILY 168 tablet 97     ASPERCREME LIDOCAINE 4 % Patch APPLY 1 PATCH TOPICALLY TO LEFT THIGH (ON IN THE MORNING, OFF AT NIGHT) 30 patch PRN     bisacodyl (DULCOLAX) 10 MG suppository Place 10 mg rectally daily as needed for constipation       calcitonin, salmon, (MIACALCIN) 200 UNIT/ACT nasal spray SPRAY 1 SPRAY INTO ONE NOSTRIL ALTERNATING NOSTRILS DAILY. ALTERNATE NOSTRIL EACH DAY. 3.7 mL 97     CALCIUM + VITAMIN D3 600-10 MG-MCG  "TABS TAKE 1 TABLET BY MOUTH TWICE DAILY 56 tablet 97     carvedilol (COREG) 12.5 MG tablet TAKE 1 TABLET BY MOUTH TWICE DAILY 56 tablet 97     Cetaphil Moisturizing (CETAPHIL) external lotion Apply topically 2 times daily       Cholecalciferol (VITAMIN D3) 50 MCG (2000 UT) TABS TAKE TWO TABLETS (100MCG / 4000 UNITS) BY MOUTH ONCE DAILY 56 tablet PRN     HYDROmorphone (DILAUDID) 2 MG tablet TAKE 1 TABLET BY MOUTH THREE TIMES DAILY 90 tablet 0     miconazole with skin protectant (SIGIFREDO ANTIFUNGAL) 2 % CREA cream APPLY TOPICALLY TO BUTTOCKS TWICE DAILY;& AS NEEDED 142 g 97     MYRBETRIQ 50 MG 24 hr tablet TAKE 1 TABLET BY MOUTH ONCE DAILY 28 tablet 97     nystatin (NYSTOP) 650776 UNIT/GM external powder Apply topically every morning Apply topically to feet       senna-docusate (SENOKOT-S/PERICOLACE) 8.6-50 MG tablet Take 1 tablet by mouth daily as needed for constipation       sennosides (SENOKOT) 8.6 MG tablet TAKE 1 TABLET BY MOUTH ONCE DAILY AS NEEDED 30 tablet 97     VITAMIN D3 25 MCG (1000 UT) tablet TAKE TWO TABLETS (2000 UNITS) BY MOUTH ONCE DAILY 60 tablet PRN     REVIEW OF SYSTEMS:  Limited secondary to cognitive impairment     Objective:  Temp 98.2  F (36.8  C)   Ht 1.499 m (4' 11\")   Wt 40.8 kg (90 lb)   SpO2 96%   BMI 18.18 kg/m    Exam:  GENERAL APPEARANCE:  Alert, in no distress, pleasant, cooperative, frail  RESP: no respiratory distress, Lung sounds clear, patient is on room air  CV:  Rate regular.  Edema trace in left lower extremity, and none in the right lower extremity.  VASCULAR: warm extremities without open areas.  M/S:   Gait and station: ambulates with walker and assist, uses wheelchair in the morning, no tenderness or swelling of the joints; able to move all extremities, kyphosis  SKIN:  Inspection and palpation of skin and subcutaneous tissue: left foot with redness (improved from last week) no open areas or drainage present today.   NEURO: no facial asymmetry, no speech deficits and able " to follow directions, moves all extremities symmetrically  PSYCH:  insight, judgement, and memory impaired affect and mood normal    Labs:   CBC RESULTS:   Recent Labs   Lab Test 08/13/20   WBC 8.3   RBC 3.49*   HGB 11.5*   HCT 35.8   *   MCH 33.0   MCHC 32.1   RDW 13.1          Last Basic Metabolic Panel:  Recent Labs   Lab Test 08/13/20 03/05/20    137   POTASSIUM 4.2  --    CHLORIDE 101  --    KEYANNA 9.4  --    CO2 24  --    BUN 27  --    CR 0.77  --    GLC 80  --        Liver Function Studies -   Recent Labs   Lab Test 08/13/20   PROTTOTAL 7.4   ALBUMIN 3.9   BILITOTAL 0.6   ALKPHOS 27*   AST 22   ALT 11       ASSESSMENT/PLAN:  Venous dermatitis  Had another flare last week and it was difficult to determine if it was fungal and dermatitis together so mycolog was ordered but not covered by insurance therefore we started treatment with just triamcinolone which she has been getting for the past two days and the redness has toned down so will continue with that for now.   --triamcinolone cream BID to areas of redness x 5 more days  --will reassess next Monday and if ongoing redness, may need to continue with one more week of triamcinolone.   --continue with the use of vanicream  --clean daily between toes daily. Allow to dry then put nystatin powder between toes.   --will continue to monitor skin integrity.     Hypertension  Paroxysmal Atrial fibrillation  BP and HR controlled today.   --continue with carvedilol 12.5 mg PO BID  --Continue to monitor blood pressure and heart rate, adjust medications as needed.    Spinal stenosis of lumbar region without neurogenic claudication   Chronic bilateral low back pain without sciatica   Chronic pain syndrome   Patient has long history of chronic back pain. Imaging with L3 and L1 compression fracture sustained from fall last year.  Previousy taking 40 mg oxycodone per day which was changed to dilaudid due to altered mental status prior to moving to the  [place]. We had discussed tapering off the dilaudid in the past but they have not agreed with this due to the chronic pain. I would recommend methadone for pain control and will discuss with Litzy and Shan. She has significant deformities in her back, head always downward.   --continue tylenol 1000 mg three times a day  --continue aspercreame/lidoderm patch once a day  --dilaudid 2 mg TID    een all toes on right and left foot daily. allow to dry then put nystatin powder in b  Electronically signed by:  TASHIA Caro CNP                     Sincerely,        TASHIA Caro CNP

## 2021-04-30 DIAGNOSIS — M79.652 PAIN OF LEFT THIGH: ICD-10-CM

## 2021-05-01 RX ORDER — LIDOCAINE 246 MG/1
PATCH TOPICAL
Qty: 30 PATCH | Refills: 97 | Status: SHIPPED | OUTPATIENT
Start: 2021-05-01 | End: 2021-11-10

## 2021-05-13 ENCOUNTER — ASSISTED LIVING VISIT (OUTPATIENT)
Dept: GERIATRICS | Facility: CLINIC | Age: 86
End: 2021-05-13
Payer: MEDICARE

## 2021-05-13 VITALS — WEIGHT: 90 LBS | TEMPERATURE: 97.7 F | OXYGEN SATURATION: 96 % | BODY MASS INDEX: 18.14 KG/M2 | HEIGHT: 59 IN

## 2021-05-13 DIAGNOSIS — I89.0 LYMPHEDEMA: ICD-10-CM

## 2021-05-13 DIAGNOSIS — R23.9 IMPAIRED SKIN INTEGRITY: ICD-10-CM

## 2021-05-13 DIAGNOSIS — S81.801A WOUND OF RIGHT LOWER EXTREMITY, INITIAL ENCOUNTER: Primary | ICD-10-CM

## 2021-05-13 ASSESSMENT — MIFFLIN-ST. JEOR: SCORE: 738.87

## 2021-05-13 NOTE — LETTER
"    5/13/2021        RE: Litzy Kenny  C/o Ben Kenny  [address] [facility] [address]        [place] [facility]    Chief Complaint   Patient presents with     RECHECK     HPI:    Litzy Kenny is a 89 year old  (4/10/1932), who is being seen today for an episodic care visit at: [facility] ([facility]) [[id number]]     Received a message from nursing yesterday that Litzy is worried about the skin on her ankles. She was visited today while siting on the couch, eating breakfast. She reports pain to the areas that are scabbed on her ankles. She does not take showers, but sponge baths. The legs do not itch,     PMH/PSH reviewed in EPIC today    REVIEW OF SYSTEMS:  Limited secondary to cognitive impairment but today pt reports as noted above.     Temp 97.7  F (36.5  C)   Ht 1.499 m (4' 11\")   Wt 40.8 kg (90 lb)   SpO2 96%   BMI 18.18 kg/m    Exam:  GENERAL APPEARANCE:  Alert, in no distress, pleasant, cooperative, frail  RESP: no respiratory distress, patient is on room air  CV: Edema 1-2 in bilateral lower extremities. VASCULAR: warm extremities. 1/2 dime sized thick scabbed area on the medial aspect of right ankle, no surrounding redness. Left lateral ankle with thick, scaled skin that is discolored. Tender to touch.   M/S:   Gait and station: ambulates with walker and assist, uses wheelchair in the morning, able to move all extremities, kyphosis  SKIN:  Inspection and palpation of skin and subcutaneous tissue: left foot much improved after lotrisone  NEURO: no facial asymmetry, no speech deficits and able to follow directions, moves all extremities symmetrically  PSYCH:  insight, judgement, and memory impaired affect and mood normal    Assessment/Plan:  (S81.801A) Wound of right lower extremity, initial encounter  (primary encounter diagnosis)  (I89.0) Lymphedema  (R23.9) Impaired skin integrity  Comment: has a scabbed area on the medial aspect of right ankle that will ask home care to " eval and if needed, recommend treatment. We may want to leave it to dry up on its own rather than debriding it but again, will have home care eval. Edema does appear worse today than in past visits as she sitis with her legs in the dependent position almost 24 hours a day.   Plan: continue with lymph wraps  --will ask home care RN to evaluate.     Electronically signed by:  TASHIA Caro CNP          Sincerely,        TASHIA Caro CNP

## 2021-05-13 NOTE — PROGRESS NOTES
"[place] [facility]    Chief Complaint   Patient presents with     RECHECK     HPI:    Litzy Kenny is a 89 year old  (4/10/1932), who is being seen today for an episodic care visit at: [facility] ([facility]) [[id number]]     Received a message from nursing yesterday that Litzy is worried about the skin on her ankles. She was visited today while siting on the couch, eating breakfast. She reports pain to the areas that are scabbed on her ankles. She does not take showers, but sponge baths. The legs do not itch,     PMH/PSH reviewed in EPIC today    REVIEW OF SYSTEMS:  Limited secondary to cognitive impairment but today pt reports as noted above.     Temp 97.7  F (36.5  C)   Ht 1.499 m (4' 11\")   Wt 40.8 kg (90 lb)   SpO2 96%   BMI 18.18 kg/m    Exam:  GENERAL APPEARANCE:  Alert, in no distress, pleasant, cooperative, frail  RESP: no respiratory distress, patient is on room air  CV: Edema 1-2 in bilateral lower extremities. VASCULAR: warm extremities. 1/2 dime sized thick scabbed area on the medial aspect of right ankle, no surrounding redness. Left lateral ankle with thick, scaled skin that is discolored. Tender to touch.   M/S:   Gait and station: ambulates with walker and assist, uses wheelchair in the morning, able to move all extremities, kyphosis  SKIN:  Inspection and palpation of skin and subcutaneous tissue: left foot much improved after lotrisone  NEURO: no facial asymmetry, no speech deficits and able to follow directions, moves all extremities symmetrically  PSYCH:  insight, judgement, and memory impaired affect and mood normal    Assessment/Plan:  (S81.801A) Wound of right lower extremity, initial encounter  (primary encounter diagnosis)  (I89.0) Lymphedema  (R23.9) Impaired skin integrity  Comment: has a scabbed area on the medial aspect of right ankle that will ask home care to eval and if needed, recommend treatment. We may want to leave it to dry up on its own rather than debriding " it but again, will have home care eval. Edema does appear worse today than in past visits as she sitis with her legs in the dependent position almost 24 hours a day.   Plan: continue with lymph wraps  --will ask home care RN to evaluate.     Electronically signed by:  TASHIA Caro CNP

## 2021-06-02 DIAGNOSIS — L85.3 DRY SKIN: Primary | ICD-10-CM

## 2021-06-02 RX ORDER — VIT E ACET/GLY/DIMETH/WATER
LOTION (ML) TOPICAL
Qty: 473 ML | Refills: 97 | Status: SHIPPED | OUTPATIENT
Start: 2021-06-02

## 2021-06-23 DIAGNOSIS — S32.010D COMPRESSION FRACTURE OF L1 VERTEBRA WITH ROUTINE HEALING, SUBSEQUENT ENCOUNTER: ICD-10-CM

## 2021-06-24 RX ORDER — HYDROMORPHONE HYDROCHLORIDE 2 MG/1
TABLET ORAL
Qty: 90 TABLET | Refills: 0 | Status: SHIPPED | OUTPATIENT
Start: 2021-06-24 | End: 2021-07-28

## 2021-07-28 DIAGNOSIS — S32.010D COMPRESSION FRACTURE OF L1 VERTEBRA WITH ROUTINE HEALING, SUBSEQUENT ENCOUNTER: ICD-10-CM

## 2021-07-28 RX ORDER — HYDROMORPHONE HYDROCHLORIDE 2 MG/1
TABLET ORAL
Qty: 90 TABLET | Refills: 0 | Status: SHIPPED | OUTPATIENT
Start: 2021-07-28 | End: 2021-08-26

## 2021-08-17 ENCOUNTER — ASSISTED LIVING VISIT (OUTPATIENT)
Dept: GERIATRICS | Facility: CLINIC | Age: 86
End: 2021-08-17
Payer: MEDICARE

## 2021-08-17 VITALS — HEIGHT: 59 IN | TEMPERATURE: 98.2 F | BODY MASS INDEX: 18.14 KG/M2 | WEIGHT: 90 LBS | OXYGEN SATURATION: 97 %

## 2021-08-17 DIAGNOSIS — M48.061 SPINAL STENOSIS OF LUMBAR REGION WITHOUT NEUROGENIC CLAUDICATION: ICD-10-CM

## 2021-08-17 DIAGNOSIS — I87.2 VENOUS STASIS DERMATITIS OF BOTH LOWER EXTREMITIES: ICD-10-CM

## 2021-08-17 DIAGNOSIS — I48.0 PAROXYSMAL ATRIAL FIBRILLATION (H): ICD-10-CM

## 2021-08-17 DIAGNOSIS — I10 BENIGN ESSENTIAL HYPERTENSION: ICD-10-CM

## 2021-08-17 DIAGNOSIS — I89.0 LYMPHEDEMA: Primary | ICD-10-CM

## 2021-08-17 DIAGNOSIS — G89.4 CHRONIC PAIN DISORDER: ICD-10-CM

## 2021-08-17 ASSESSMENT — MIFFLIN-ST. JEOR: SCORE: 738.87

## 2021-08-17 NOTE — LETTER
"    8/17/2021        RE: Litzy Kenny  C/o Ben Kenny  [address] [facility] [address]        [place] [facility]  [place] Medical Record Number: [id number]  Place of Service where encounter took place: [facility]) [[id number]]  Chief Complaint   Patient presents with     RECHECK     Routine       HPI:    Litzy Kenny is a 88 year old (4/10/1932), who is being seen today for an episodic care visit. HPI information obtained from: facility chart records, facility staff, patient report and [facility] chart review.     was visited today while in her room, sitting on the couch. She continues to sleep on the couch at night, sitting up with the legs in the dependent positioning. She is concerned over the \"open area\" on the left foot. She has been eating fair. Has chronic back pain. Unable to obtain full hx due to dementia.     Past Medical and Surgical History reviewed in Epic today.    MEDICATIONS:  Current Outpatient Medications   Medication Sig Dispense Refill     ACETAMINOPHEN EXTRA STRENGTH 500 MG tablet TAKE TWO TABLETS (1000MG) BY MOUTH THREE TIMES DAILY 168 tablet 97     ASPERCREME LIDOCAINE 4 % Patch APPLY 1 PATCH TOPICALLY TO LEFT THIGH (ON FOR 12 HOURS, OFF FOR 12 HOURS) 30 patch 97     bisacodyl (DULCOLAX) 10 MG suppository Place 10 mg rectally daily as needed for constipation       calcitonin, salmon, (MIACALCIN) 200 UNIT/ACT nasal spray SPRAY 1 SPRAY INTO ONE NOSTRIL ALTERNATING NOSTRILS DAILY. ALTERNATE NOSTRIL EACH DAY. 3.7 mL 97     CALCIUM + VITAMIN D3 600-10 MG-MCG TABS TAKE 1 TABLET BY MOUTH TWICE DAILY 56 tablet 97     carvedilol (COREG) 12.5 MG tablet TAKE 1 TABLET BY MOUTH TWICE DAILY 56 tablet 97     Cetaphil Moisturizing (CETAPHIL) external lotion APPLY TOPICALLY TWICE DAILY 473 mL 97     Cholecalciferol (VITAMIN D3) 50 MCG (2000 UT) TABS TAKE TWO TABLETS (100MCG / 4000 UNITS) BY MOUTH ONCE DAILY 56 tablet PRN     Emollient (VANICREAM EX) Externally " "apply topically 2 times daily       HYDROmorphone (DILAUDID) 2 MG tablet TAKE 1 TABLET BY MOUTH THREE TIMES DAILY 90 tablet 0     miconazole with skin protectant (SIGIFREDO ANTIFUNGAL) 2 % CREA cream APPLY TOPICALLY TO BUTTOCKS TWICE DAILY;& AS NEEDED 142 g 97     MYRBETRIQ 50 MG 24 hr tablet TAKE 1 TABLET BY MOUTH ONCE DAILY 28 tablet 97     nystatin (NYSTOP) 937560 UNIT/GM external powder Apply topically every morning Apply topically to feet       senna-docusate (SENOKOT-S/PERICOLACE) 8.6-50 MG tablet Take 1 tablet by mouth daily as needed for constipation       sennosides (SENOKOT) 8.6 MG tablet TAKE 1 TABLET BY MOUTH ONCE DAILY AS NEEDED 30 tablet 97     VITAMIN D3 25 MCG (1000 UT) tablet TAKE TWO TABLETS (2000 UNITS) BY MOUTH ONCE DAILY 60 tablet PRN     REVIEW OF SYSTEMS:  Limited secondary to cognitive impairment     Objective:  Temp 98.2  F (36.8  C)   Ht 1.499 m (4' 11\")   Wt 40.8 kg (90 lb)   SpO2 97%   BMI 18.18 kg/m    Exam:  GENERAL APPEARANCE:  Alert, in no distress, pleasant, cooperative, frail  RESP: no respiratory distress, Lung sounds clear, patient is on room air  CV:  Rate regular.  Edema trace in bilateral lower extremities. Lymphedema wraps are in place..  VASCULAR: warm extremities without open areas.  M/S:   Gait and station: ambulates with walker and assist, uses wheelchair in the morning, no tenderness or swelling of the joints; able to move all extremities, kyphosis  SKIN:  Inspection and palpation of skin and subcutaneous tissue: left foot with pink but no red  NEURO: no facial asymmetry, no speech deficits and able to follow directions, moves all extremities symmetrically  PSYCH:  insight, judgement, and memory impaired affect and mood normal    Labs:   CBC RESULTS:   Recent Labs   Lab Test 08/13/20   WBC 8.3   RBC 3.49*   HGB 11.5*   HCT 35.8   *   MCH 33.0   MCHC 32.1   RDW 13.1          Last Basic Metabolic Panel:  Recent Labs   Lab Test 08/13/20 03/05/20    137 "   POTASSIUM 4.2  --    CHLORIDE 101  --    KEYANNA 9.4  --    CO2 24  --    BUN 27  --    CR 0.77  --    GLC 80  --        Liver Function Studies -   Recent Labs   Lab Test 08/13/20   PROTTOTAL 7.4   ALBUMIN 3.9   BILITOTAL 0.6   ALKPHOS 27*   AST 22   ALT 11       ASSESSMENT/PLAN:  Venous dermatitis  lymphedema  Has had intermittent flares this year but her skin looks the best today as it ever has. No open areas. Edema is well controlled today.   --continue with the use of vanicream  --clean daily between toes daily. Allow to dry then put nystatin powder between toes.   --will continue to monitor skin integrity.     Hypertension  Paroxysmal Atrial fibrillation  BP and HR controlled today.   --continue with carvedilol 12.5 mg PO BID  --Continue to monitor blood pressure and heart rate, adjust medications as needed.    Spinal stenosis of lumbar region without neurogenic claudication   Chronic bilateral low back pain without sciatica   Chronic pain syndrome   Patient has long history of chronic back pain. Imaging with L3 and L1 compression fracture sustained from a fall several years ago. Has significant kyphosis  --continue tylenol 1000 mg three times a day  --continue aspercreame/lidoderm patch once a day  --dilaudid 2 mg TID  een all toes on right and left foot daily. allow to dry then put nystatin powder in b  Electronically signed by:  TASHIA Caro CNP                       Sincerely,        TASHIA Caro CNP

## 2021-08-17 NOTE — PROGRESS NOTES
"[place] [facility]  [place] Medical Record Number: [id number]  Place of Service where encounter took place: [facility] ([facility]) [[id number]]  Chief Complaint   Patient presents with     RECHECK     Routine       HPI:    Litzy Kenny is a 88 year old (4/10/1932), who is being seen today for an episodic care visit. HPI information obtained from: facility chart records, facility staff, patient report and [facility] chart review.     was visited today while in her room, sitting on the couch. She continues to sleep on the couch at night, sitting up with the legs in the dependent positioning. She is concerned over the \"open area\" on the left foot. She has been eating fair. Has chronic back pain. Unable to obtain full hx due to dementia.     Past Medical and Surgical History reviewed in Epic today.    MEDICATIONS:  Current Outpatient Medications   Medication Sig Dispense Refill     ACETAMINOPHEN EXTRA STRENGTH 500 MG tablet TAKE TWO TABLETS (1000MG) BY MOUTH THREE TIMES DAILY 168 tablet 97     ASPERCREME LIDOCAINE 4 % Patch APPLY 1 PATCH TOPICALLY TO LEFT THIGH (ON FOR 12 HOURS, OFF FOR 12 HOURS) 30 patch 97     bisacodyl (DULCOLAX) 10 MG suppository Place 10 mg rectally daily as needed for constipation       calcitonin, salmon, (MIACALCIN) 200 UNIT/ACT nasal spray SPRAY 1 SPRAY INTO ONE NOSTRIL ALTERNATING NOSTRILS DAILY. ALTERNATE NOSTRIL EACH DAY. 3.7 mL 97     CALCIUM + VITAMIN D3 600-10 MG-MCG TABS TAKE 1 TABLET BY MOUTH TWICE DAILY 56 tablet 97     carvedilol (COREG) 12.5 MG tablet TAKE 1 TABLET BY MOUTH TWICE DAILY 56 tablet 97     Cetaphil Moisturizing (CETAPHIL) external lotion APPLY TOPICALLY TWICE DAILY 473 mL 97     Cholecalciferol (VITAMIN D3) 50 MCG (2000 UT) TABS TAKE TWO TABLETS (100MCG / 4000 UNITS) BY MOUTH ONCE DAILY 56 tablet PRN     Emollient (VANICREAM EX) Externally apply topically 2 times daily       HYDROmorphone (DILAUDID) 2 MG tablet TAKE 1 TABLET BY MOUTH THREE TIMES " "DAILY 90 tablet 0     miconazole with skin protectant (SIGIFREDO ANTIFUNGAL) 2 % CREA cream APPLY TOPICALLY TO BUTTOCKS TWICE DAILY;& AS NEEDED 142 g 97     MYRBETRIQ 50 MG 24 hr tablet TAKE 1 TABLET BY MOUTH ONCE DAILY 28 tablet 97     nystatin (NYSTOP) 648815 UNIT/GM external powder Apply topically every morning Apply topically to feet       senna-docusate (SENOKOT-S/PERICOLACE) 8.6-50 MG tablet Take 1 tablet by mouth daily as needed for constipation       sennosides (SENOKOT) 8.6 MG tablet TAKE 1 TABLET BY MOUTH ONCE DAILY AS NEEDED 30 tablet 97     VITAMIN D3 25 MCG (1000 UT) tablet TAKE TWO TABLETS (2000 UNITS) BY MOUTH ONCE DAILY 60 tablet PRN     REVIEW OF SYSTEMS:  Limited secondary to cognitive impairment     Objective:  Temp 98.2  F (36.8  C)   Ht 1.499 m (4' 11\")   Wt 40.8 kg (90 lb)   SpO2 97%   BMI 18.18 kg/m    Exam:  GENERAL APPEARANCE:  Alert, in no distress, pleasant, cooperative, frail  RESP: no respiratory distress, Lung sounds clear, patient is on room air  CV:  Rate regular.  Edema trace in bilateral lower extremities. Lymphedema wraps are in place..  VASCULAR: warm extremities without open areas.  M/S:   Gait and station: ambulates with walker and assist, uses wheelchair in the morning, no tenderness or swelling of the joints; able to move all extremities, kyphosis  SKIN:  Inspection and palpation of skin and subcutaneous tissue: left foot with pink but no red  NEURO: no facial asymmetry, no speech deficits and able to follow directions, moves all extremities symmetrically  PSYCH:  insight, judgement, and memory impaired affect and mood normal    Labs:   CBC RESULTS:   Recent Labs   Lab Test 08/13/20   WBC 8.3   RBC 3.49*   HGB 11.5*   HCT 35.8   *   MCH 33.0   MCHC 32.1   RDW 13.1          Last Basic Metabolic Panel:  Recent Labs   Lab Test 08/13/20 03/05/20    137   POTASSIUM 4.2  --    CHLORIDE 101  --    KEYANNA 9.4  --    CO2 24  --    BUN 27  --    CR 0.77  --    GLC 80  -- "        Liver Function Studies -   Recent Labs   Lab Test 08/13/20   PROTTOTAL 7.4   ALBUMIN 3.9   BILITOTAL 0.6   ALKPHOS 27*   AST 22   ALT 11       ASSESSMENT/PLAN:  Venous dermatitis  lymphedema  Has had intermittent flares this year but her skin looks the best today as it ever has. No open areas. Edema is well controlled today.   --continue with the use of vanicream  --clean daily between toes daily. Allow to dry then put nystatin powder between toes.   --will continue to monitor skin integrity.     Hypertension  Paroxysmal Atrial fibrillation  BP and HR controlled today.   --continue with carvedilol 12.5 mg PO BID  --Continue to monitor blood pressure and heart rate, adjust medications as needed.    Spinal stenosis of lumbar region without neurogenic claudication   Chronic bilateral low back pain without sciatica   Chronic pain syndrome   Patient has long history of chronic back pain. Imaging with L3 and L1 compression fracture sustained from a fall several years ago. Has significant kyphosis  --continue tylenol 1000 mg three times a day  --continue aspercreame/lidoderm patch once a day  --dilaudid 2 mg TID  een all toes on right and left foot daily. allow to dry then put nystatin powder in b  Electronically signed by:  TASHIA Caro CNP

## 2021-08-26 DIAGNOSIS — S32.010D COMPRESSION FRACTURE OF L1 VERTEBRA WITH ROUTINE HEALING, SUBSEQUENT ENCOUNTER: ICD-10-CM

## 2021-08-26 RX ORDER — HYDROMORPHONE HYDROCHLORIDE 2 MG/1
TABLET ORAL
Qty: 90 TABLET | Refills: 0 | Status: SHIPPED | OUTPATIENT
Start: 2021-08-26 | End: 2021-09-17

## 2021-09-08 ENCOUNTER — LAB REQUISITION (OUTPATIENT)
Dept: LAB | Facility: CLINIC | Age: 86
End: 2021-09-08
Payer: MEDICARE

## 2021-09-08 DIAGNOSIS — U07.1 COVID-19: ICD-10-CM

## 2021-09-08 PROCEDURE — U0005 INFEC AGEN DETEC AMPLI PROBE: HCPCS | Mod: ORL | Performed by: INTERNAL MEDICINE

## 2021-09-09 LAB — SARS-COV-2 RNA RESP QL NAA+PROBE: NEGATIVE

## 2021-09-12 ENCOUNTER — HEALTH MAINTENANCE LETTER (OUTPATIENT)
Age: 86
End: 2021-09-12

## 2021-09-16 DIAGNOSIS — S32.010D COMPRESSION FRACTURE OF L1 VERTEBRA WITH ROUTINE HEALING, SUBSEQUENT ENCOUNTER: ICD-10-CM

## 2021-09-16 PROCEDURE — U0003 INFECTIOUS AGENT DETECTION BY NUCLEIC ACID (DNA OR RNA); SEVERE ACUTE RESPIRATORY SYNDROME CORONAVIRUS 2 (SARS-COV-2) (CORONAVIRUS DISEASE [COVID-19]), AMPLIFIED PROBE TECHNIQUE, MAKING USE OF HIGH THROUGHPUT TECHNOLOGIES AS DESCRIBED BY CMS-2020-01-R: HCPCS | Mod: ORL | Performed by: INTERNAL MEDICINE

## 2021-09-17 ENCOUNTER — LAB REQUISITION (OUTPATIENT)
Dept: LAB | Facility: CLINIC | Age: 86
End: 2021-09-17
Payer: MEDICARE

## 2021-09-17 DIAGNOSIS — U07.1 COVID-19: ICD-10-CM

## 2021-09-17 RX ORDER — HYDROMORPHONE HYDROCHLORIDE 2 MG/1
TABLET ORAL
Qty: 90 TABLET | Refills: 0 | Status: SHIPPED | OUTPATIENT
Start: 2021-09-17 | End: 2021-10-21

## 2021-09-20 LAB — SARS-COV-2 RNA RESP QL NAA+PROBE: NOT DETECTED

## 2021-09-22 DIAGNOSIS — I87.2 VENOUS STASIS DERMATITIS OF LEFT LOWER EXTREMITY: Primary | ICD-10-CM

## 2021-09-23 RX ORDER — NYSTATIN 100000 [USP'U]/G
POWDER TOPICAL
Qty: 60 G | Refills: 97 | Status: SHIPPED | OUTPATIENT
Start: 2021-09-23 | End: 2022-01-01

## 2021-10-14 DIAGNOSIS — K59.00 CONSTIPATION, UNSPECIFIED CONSTIPATION TYPE: Primary | ICD-10-CM

## 2021-10-15 DIAGNOSIS — I48.0 PAROXYSMAL ATRIAL FIBRILLATION (H): ICD-10-CM

## 2021-10-15 RX ORDER — BISACODYL 10 MG
SUPPOSITORY, RECTAL RECTAL
Qty: 10 SUPPOSITORY | Refills: 97 | Status: SHIPPED | OUTPATIENT
Start: 2021-10-15

## 2021-10-17 RX ORDER — CARVEDILOL 12.5 MG/1
TABLET ORAL
Qty: 56 TABLET | Status: SHIPPED | OUTPATIENT
Start: 2021-10-17 | End: 2022-01-01

## 2021-10-20 DIAGNOSIS — S32.010D COMPRESSION FRACTURE OF L1 VERTEBRA WITH ROUTINE HEALING, SUBSEQUENT ENCOUNTER: ICD-10-CM

## 2021-10-21 RX ORDER — HYDROMORPHONE HYDROCHLORIDE 2 MG/1
TABLET ORAL
Qty: 90 TABLET | Refills: 0 | Status: SHIPPED | OUTPATIENT
Start: 2021-10-21 | End: 2021-11-23

## 2021-11-10 ENCOUNTER — ASSISTED LIVING VISIT (OUTPATIENT)
Dept: GERIATRICS | Facility: CLINIC | Age: 86
End: 2021-11-10
Payer: MEDICARE

## 2021-11-10 VITALS — WEIGHT: 90 LBS | BODY MASS INDEX: 18.18 KG/M2 | TEMPERATURE: 97.9 F

## 2021-11-10 DIAGNOSIS — S32.000S LUMBAR COMPRESSION FRACTURE, SEQUELA: ICD-10-CM

## 2021-11-10 DIAGNOSIS — M48.061 SPINAL STENOSIS OF LUMBAR REGION WITHOUT NEUROGENIC CLAUDICATION: ICD-10-CM

## 2021-11-10 DIAGNOSIS — I89.0 LYMPHEDEMA: ICD-10-CM

## 2021-11-10 DIAGNOSIS — F02.80 LATE ONSET ALZHEIMER'S DISEASE WITHOUT BEHAVIORAL DISTURBANCE (H): Primary | ICD-10-CM

## 2021-11-10 DIAGNOSIS — I87.2 VENOUS STASIS DERMATITIS OF BOTH LOWER EXTREMITIES: ICD-10-CM

## 2021-11-10 DIAGNOSIS — I10 BENIGN ESSENTIAL HYPERTENSION: ICD-10-CM

## 2021-11-10 DIAGNOSIS — G89.29 CHRONIC BILATERAL LOW BACK PAIN WITHOUT SCIATICA: ICD-10-CM

## 2021-11-10 DIAGNOSIS — M54.50 CHRONIC BILATERAL LOW BACK PAIN WITHOUT SCIATICA: ICD-10-CM

## 2021-11-10 DIAGNOSIS — I48.0 PAROXYSMAL ATRIAL FIBRILLATION (H): ICD-10-CM

## 2021-11-10 DIAGNOSIS — R32 URINARY INCONTINENCE, UNSPECIFIED TYPE: ICD-10-CM

## 2021-11-10 DIAGNOSIS — G30.1 LATE ONSET ALZHEIMER'S DISEASE WITHOUT BEHAVIORAL DISTURBANCE (H): Primary | ICD-10-CM

## 2021-11-10 RX ORDER — LIDOCAINE 4 G/G
1 PATCH TOPICAL EVERY 24 HOURS
COMMUNITY
End: 2021-12-10

## 2021-11-10 NOTE — LETTER
"    11/10/2021        RE: Litzy Kenny  C/o Ben Kenny  [address] [facility] [address]        [place] [facility]  [place] [facility] Record Number:  [id number]  [facility] [[id number][facility]  Chief Complaint   Patient presents with     RECHECK       HPI:    Litzy Kenny  is a 89 year old (4/10/1932), who is being seen today for an episodic care visit.  HPI information obtained from: facility chart records, facility staff, patient report and [facility] chart review.  She and her  have lived at this facility since 1/2020. Medical history significant for dementia, chronic LBP, chronic compression fractures, opioid dependent, paroxsymal afib, bilateral LE edema.      Today's concern is:     Late onset Alzheimer's disease without behavioral disturbance (H)  Paroxysmal atrial fibrillation (H)  Spinal stenosis of lumbar region without neurogenic claudication  Chronic bilateral low back pain without sciatica  Lumbar compression fracture, sequela  Benign essential hypertension  Lymphedema  Venous stasis dermatitis of both lower extremities  Urinary incontinence, unspecified type   She is a fair historian. Reports she has back pain \"all the time\" but does not think it is worse than usual. Stays on the couch and sleeps there at night. Uses a wheelchair for toileting and cares. Incontinent of urine, denies pain or burning.   She and her  stay in their apt and meals are brought to them. Apartment is somewhat cluttered.  They both feel that they are managing well.       Past Medical and Surgical History reviewed in Epic today.    MEDICATIONS:    Current Outpatient Medications   Medication Sig Dispense Refill     ACETAMINOPHEN EXTRA STRENGTH 500 MG tablet TAKE TWO TABLETS (1000MG) BY MOUTH THREE TIMES DAILY 168 tablet 97     bisacodyl (DULCOLAX) 10 MG suppository UNWRAP AND INSERT ONE SUPPOSITORY RECTALLY ONCE DAILY AS NEEDED FOR " CONSTIPATION 10 suppository 97     CALCIUM + VITAMIN D3 600-10 MG-MCG TABS TAKE 1 TABLET BY MOUTH TWICE DAILY 56 tablet 97     carvedilol (COREG) 12.5 MG tablet TAKE 1 TABLET BY MOUTH TWICE DAILY 56 tablet PRN     Cetaphil Moisturizing (CETAPHIL) external lotion APPLY TOPICALLY TWICE DAILY 473 mL 97     Cholecalciferol (VITAMIN D3) 50 MCG (2000 UT) TABS TAKE TWO TABLETS (100MCG / 4000 UNITS) BY MOUTH ONCE DAILY 56 tablet PRN     HYDROmorphone (DILAUDID) 2 MG tablet TAKE 1 TABLET BY MOUTH THREE TIMES DAILY 90 tablet 0     Lidocaine (LIDOCARE) 4 % Patch Place 1 patch onto the skin every 24 hours To back for 12 hrs daily       miconazole with skin protectant (SIGIFREDO ANTIFUNGAL) 2 % CREA cream APPLY TOPICALLY TO BUTTOCKS TWICE DAILY;& AS NEEDED 142 g 97     MYRBETRIQ 50 MG 24 hr tablet TAKE 1 TABLET BY MOUTH ONCE DAILY 28 tablet 97     NYSTOP 085306 UNIT/GM powder APPLY TOPICALLY TO FEET IN THE MORNING 60 g 97     sennosides (SENOKOT) 8.6 MG tablet TAKE 1 TABLET BY MOUTH ONCE DAILY AS NEEDED 30 tablet 97         REVIEW OF SYSTEMS:  Limited secondary to cognitive impairment. Positives as noted under HPI.     Objective:  Temp 97.9  F (36.6  C)   Wt 40.8 kg (90 lb)   BMI 18.18 kg/m    Exam:  GENERAL APPEARANCE:  Alert, in no distress  ENT:  [place], oropharynx clear  EYES:  EOM normal, conjunctiva and lids normal  NECK: no adenopathy,masses or thyromegaly  RESP:  no respiratory distress, diminished breath sounds bilaterally, no crackles or wheezes  CV:  regular rate and rhythm, no murmur, +2 pedal pulses, peripheral edema trace+ in both LE  ABDOMEN:  soft, non-tender, no distension, no masses  M/S:   sitting on cough, severe kyphosis. LIZARRAGA with good strength. No joint inflammation  SKIN:  stasis discoloration both LE, no open areas. No rashes  PSYCH:  oriented to self, , place, insight and judgement impaired, memory impaired , affect and mood normal    Labs:   Recent labs in [facility] reviewed by me today.     ASSESSMENT /  PLAN:  (G30.1,  F02.80) Late onset Alzheimer's disease without behavioral disturbance (H)  (primary encounter diagnosis)  Comment: severe deficits with SLUMS 9/30, CPT 4.1/5.6. Low functional status. Appears to have hoarding tendencies.   Plan: AL staff to assist with cares, meals and med admin.   Will update daughter Susan Hume re: plan of care.     (I48.0) Paroxysmal atrial fibrillation (H)  Comment: recent HR 76. CHADS-VASc of 4. Not anticoagulated per patient preference   Plan: continue carvedilol.     (M48.061) Spinal stenosis of lumbar region without neurogenic claudication  (M54.50,  G89.29) Chronic bilateral low back pain without sciatica  (S32.000S) Lumbar compression fracture, sequela  Comment: long standing. Pain fairly well managed. Opioid dependent for years.    Plan: continue lidocaine patch, tylenol, dilaudid 2 mg tid. Continue calcium and vitamin D. Wheelchair for mobility     (I10) Benign essential hypertension  Comment: controlled with recent /65   Plan: continue carvedilol. Avoid hypotension due to advanced age and fall risk     (I89.0) Lymphedema   (I87.2) Venous stasis dermatitis of both lower extremities  Comment: minimal edema on exam today, skin intact. Hospitalized 10/33598 for venous stasis ulcers with infection.   Plan: continue moisturizer and closely monitor for breakdown.     (R32) Urinary incontinence, unspecified type  Comment: chronic. Last evaluated by Urology in 2019.  No s/s of infection   Plan: continue Myrbetriq. Staff to assist with toileting and hygiene.       Electronically signed by:  TASHIA Youngblood CNP                 Sincerely,        TASHIA Youngblood CNP

## 2021-11-10 NOTE — PROGRESS NOTES
"[place] [facility]  [place] Medical Record Number:  [id number]  Place of Service where encounter took place:  [facility] ([facility]) [[id number]]  Chief Complaint   Patient presents with     RECHECK       HPI:    Litzy Kenny  is a 89 year old (4/10/1932), who is being seen today for an episodic care visit.  HPI information obtained from: facility chart records, facility staff, patient report and [facility] chart review.  She and her  have lived at this facility since 1/2020. Medical history significant for dementia, chronic LBP, chronic compression fractures, opioid dependent, paroxsymal afib, bilateral LE edema.      Today's concern is:     Late onset Alzheimer's disease without behavioral disturbance (H)  Paroxysmal atrial fibrillation (H)  Spinal stenosis of lumbar region without neurogenic claudication  Chronic bilateral low back pain without sciatica  Lumbar compression fracture, sequela  Benign essential hypertension  Lymphedema  Venous stasis dermatitis of both lower extremities  Urinary incontinence, unspecified type   She is a fair historian. Reports she has back pain \"all the time\" but does not think it is worse than usual. Stays on the couch and sleeps there at night. Uses a wheelchair for toileting and cares. Incontinent of urine, denies pain or burning.   She and her  stay in their apt and meals are brought to them. Apartment is somewhat cluttered.  They both feel that they are managing well.       Past Medical and Surgical History reviewed in Epic today.    MEDICATIONS:    Current Outpatient Medications   Medication Sig Dispense Refill     ACETAMINOPHEN EXTRA STRENGTH 500 MG tablet TAKE TWO TABLETS (1000MG) BY MOUTH THREE TIMES DAILY 168 tablet 97     bisacodyl (DULCOLAX) 10 MG suppository UNWRAP AND INSERT ONE SUPPOSITORY RECTALLY ONCE DAILY AS NEEDED FOR CONSTIPATION 10 suppository 97     CALCIUM + VITAMIN D3 600-10 MG-MCG TABS TAKE 1 TABLET BY MOUTH TWICE DAILY 56 " tablet 97     carvedilol (COREG) 12.5 MG tablet TAKE 1 TABLET BY MOUTH TWICE DAILY 56 tablet PRN     Cetaphil Moisturizing (CETAPHIL) external lotion APPLY TOPICALLY TWICE DAILY 473 mL 97     Cholecalciferol (VITAMIN D3) 50 MCG (2000 UT) TABS TAKE TWO TABLETS (100MCG / 4000 UNITS) BY MOUTH ONCE DAILY 56 tablet PRN     HYDROmorphone (DILAUDID) 2 MG tablet TAKE 1 TABLET BY MOUTH THREE TIMES DAILY 90 tablet 0     Lidocaine (LIDOCARE) 4 % Patch Place 1 patch onto the skin every 24 hours To back for 12 hrs daily       miconazole with skin protectant (SIGIFREDO ANTIFUNGAL) 2 % CREA cream APPLY TOPICALLY TO BUTTOCKS TWICE DAILY;& AS NEEDED 142 g 97     MYRBETRIQ 50 MG 24 hr tablet TAKE 1 TABLET BY MOUTH ONCE DAILY 28 tablet 97     NYSTOP [id number] UNIT/GM powder APPLY TOPICALLY TO FEET IN THE MORNING 60 g 97     sennosides (SENOKOT) 8.6 MG tablet TAKE 1 TABLET BY MOUTH ONCE DAILY AS NEEDED 30 tablet 97         REVIEW OF SYSTEMS:  Limited secondary to cognitive impairment. Positives as noted under HPI.     Objective:  Temp 97.9  F (36.6  C)   Wt 40.8 kg (90 lb)   BMI 18.18 kg/m    Exam:  GENERAL APPEARANCE:  Alert, in no distress  ENT:  "[place] oropharynx clear  EYES:  EOM normal, conjunctiva and lids normal  NECK: no adenopathy,masses or thyromegaly  RESP:  no respiratory distress, diminished breath sounds bilaterally, no crackles or wheezes  CV:  regular rate and rhythm, no murmur, +2 pedal pulses, peripheral edema trace+ in both LE  ABDOMEN:  soft, non-tender, no distension, no masses  M/S:   sitting on cough, severe kyphosis. LIZARRAGA with good strength. No joint inflammation  SKIN:  stasis discoloration both LE, no open areas. No rashes  PSYCH:  oriented to self, , place, insight and judgement impaired, memory impaired , affect and mood normal    Labs:   Recent labs in [facility] reviewed by me today.     ASSESSMENT / PLAN:  (G30.1,  F02.80) Late onset Alzheimer's disease without behavioral disturbance (H)  (primary encounter  diagnosis)  Comment: severe deficits with SLUMS 9/30, CPT 4.1/5.6. Low functional status. Appears to have hoarding tendencies.   Plan: AL staff to assist with cares, meals and med admin.   Will update daughter Susan Hume re: plan of care.     (I48.0) Paroxysmal atrial fibrillation (H)  Comment: recent HR 76. CHADS-VASc of 4. Not anticoagulated per patient preference   Plan: continue carvedilol.     (M48.061) Spinal stenosis of lumbar region without neurogenic claudication  (M54.50,  G89.29) Chronic bilateral low back pain without sciatica  (S32.000S) Lumbar compression fracture, sequela  Comment: long standing. Pain fairly well managed. Opioid dependent for years.    Plan: continue lidocaine patch, tylenol, dilaudid 2 mg tid. Continue calcium and vitamin D. Wheelchair for mobility     (I10) Benign essential hypertension  Comment: controlled with recent /65   Plan: continue carvedilol. Avoid hypotension due to advanced age and fall risk     (I89.0) Lymphedema   (I87.2) Venous stasis dermatitis of both lower extremities  Comment: minimal edema on exam today, skin intact. Hospitalized 10/97400 for venous stasis ulcers with infection.   Plan: continue moisturizer and closely monitor for breakdown.     (R32) Urinary incontinence, unspecified type  Comment: chronic. Last evaluated by Urology in 2019.  No s/s of infection   Plan: continue Myrbetriq. Staff to assist with toileting and hygiene.       Electronically signed by:  TASHIA Youngblood CNP

## 2021-11-23 DIAGNOSIS — S32.010D COMPRESSION FRACTURE OF L1 VERTEBRA WITH ROUTINE HEALING, SUBSEQUENT ENCOUNTER: ICD-10-CM

## 2021-11-23 RX ORDER — HYDROMORPHONE HYDROCHLORIDE 2 MG/1
TABLET ORAL
Qty: 90 TABLET | Refills: 0 | Status: SHIPPED | OUTPATIENT
Start: 2021-11-23 | End: 2021-12-27

## 2021-12-10 DIAGNOSIS — M54.50 CHRONIC BILATERAL LOW BACK PAIN WITHOUT SCIATICA: Primary | ICD-10-CM

## 2021-12-10 DIAGNOSIS — G89.29 CHRONIC BILATERAL LOW BACK PAIN WITHOUT SCIATICA: Primary | ICD-10-CM

## 2021-12-10 RX ORDER — LIDOCAINE 4 G/G
1 PATCH TOPICAL EVERY 24 HOURS
Qty: 5 PATCH | Refills: 3 | Status: SHIPPED | OUTPATIENT
Start: 2021-12-10 | End: 2022-01-25

## 2021-12-27 DIAGNOSIS — S32.010D COMPRESSION FRACTURE OF L1 VERTEBRA WITH ROUTINE HEALING, SUBSEQUENT ENCOUNTER: ICD-10-CM

## 2021-12-27 RX ORDER — HYDROMORPHONE HYDROCHLORIDE 2 MG/1
TABLET ORAL
Qty: 90 TABLET | Refills: 0 | Status: SHIPPED | OUTPATIENT
Start: 2021-12-27 | End: 2022-01-20

## 2021-12-28 VITALS — TEMPERATURE: 97 F | WEIGHT: 90 LBS | BODY MASS INDEX: 18.18 KG/M2

## 2021-12-29 ENCOUNTER — ASSISTED LIVING VISIT (OUTPATIENT)
Dept: GERIATRICS | Facility: CLINIC | Age: 86
End: 2021-12-29
Payer: MEDICARE

## 2021-12-29 DIAGNOSIS — G89.4 CHRONIC PAIN DISORDER: ICD-10-CM

## 2021-12-29 DIAGNOSIS — I48.0 PAROXYSMAL ATRIAL FIBRILLATION (H): ICD-10-CM

## 2021-12-29 DIAGNOSIS — I10 BENIGN ESSENTIAL HYPERTENSION: ICD-10-CM

## 2021-12-29 DIAGNOSIS — G30.1 LATE ONSET ALZHEIMER'S DISEASE WITHOUT BEHAVIORAL DISTURBANCE (H): Primary | ICD-10-CM

## 2021-12-29 DIAGNOSIS — F02.80 LATE ONSET ALZHEIMER'S DISEASE WITHOUT BEHAVIORAL DISTURBANCE (H): Primary | ICD-10-CM

## 2021-12-29 DIAGNOSIS — M48.061 SPINAL STENOSIS OF LUMBAR REGION WITHOUT NEUROGENIC CLAUDICATION: ICD-10-CM

## 2021-12-29 DIAGNOSIS — I89.0 LYMPHEDEMA: ICD-10-CM

## 2021-12-29 DIAGNOSIS — I87.2 VENOUS STASIS DERMATITIS OF BOTH LOWER EXTREMITIES: ICD-10-CM

## 2021-12-29 DIAGNOSIS — S32.000S LUMBAR COMPRESSION FRACTURE, SEQUELA: ICD-10-CM

## 2021-12-29 NOTE — PROGRESS NOTES
"[place] [facility]  [place] Medical Record Number:  [id number]  Place of Service where encounter took place:  [facility] ([facility]) [[id number]]  Chief Complaint   Patient presents with     RECHECK       HPI:    Litzy Kenny  is a 89 year old (4/10/1932), who is being seen today for an episodic care visit.  HPI information obtained from: facility chart records, facility staff, patient report and [facility] chart review.   She and her  have lived at this facility since 1/2020. Medical history significant for dementia, chronic LBP, chronic compression fractures, opioid dependent, paroxsymal afib, bilateral LE edema.    Today's concern is:     Late onset Alzheimer's disease without behavioral disturbance (H)  Paroxysmal atrial fibrillation (H)  Venous stasis dermatitis of both lower extremities  Lymphedema  Spinal stenosis of lumbar region without neurogenic claudication  Lumbar compression fracture, sequela  Chronic pain disorder  Benign essential hypertension   She is seen today to check 2 areas on her feet, as requested by her . Patient is a fair historian. Reports \"constant\" back and leg pain, unchanged from usual. Denies pain or open areas of her feet. Good appetite. She stays on the couch, including sleeping there at night. Uses a wheelchair for toileting and cares. Incontinent of urine. Staff assists with hygiene. She and her  have meals delivered to their apt.     Past Medical and Surgical History reviewed in Epic today.    MEDICATIONS:    Current Outpatient Medications   Medication Sig Dispense Refill     ACETAMINOPHEN EXTRA STRENGTH 500 MG tablet TAKE TWO TABLETS (1000MG) BY MOUTH THREE TIMES DAILY 168 tablet 97     bisacodyl (DULCOLAX) 10 MG suppository UNWRAP AND INSERT ONE SUPPOSITORY RECTALLY ONCE DAILY AS NEEDED FOR CONSTIPATION 10 suppository 97     CALCIUM + VITAMIN D3 600-10 MG-MCG TABS TAKE 1 TABLET BY MOUTH TWICE DAILY 56 tablet 97     carvedilol (COREG) 12.5 " MG tablet TAKE 1 TABLET BY MOUTH TWICE DAILY 56 tablet PRN     Cetaphil Moisturizing (CETAPHIL) external lotion APPLY TOPICALLY TWICE DAILY 473 mL 97     Cholecalciferol (VITAMIN D3) 50 MCG (2000 UT) TABS TAKE TWO TABLETS (100MCG / 4000 UNITS) BY MOUTH ONCE DAILY 56 tablet PRN     HYDROmorphone (DILAUDID) 2 MG tablet TAKE 1 TABLET BY MOUTH THREE TIMES DAILY 90 tablet 0     Lidocaine (LIDOCARE) 4 % Patch Place 1 patch onto the skin every 24 hours To back for 12 hrs daily 5 patch 3     miconazole with skin protectant (SIGIFREDO ANTIFUNGAL) 2 % CREA cream APPLY TOPICALLY TO BUTTOCKS TWICE DAILY;& AS NEEDED 142 g 97     MYRBETRIQ 50 MG 24 hr tablet TAKE 1 TABLET BY MOUTH ONCE DAILY 28 tablet 97     NYSTOP [id number] UNIT/GM powder APPLY TOPICALLY TO FEET IN THE MORNING 60 g 97     sennosides (SENOKOT) 8.6 MG tablet TAKE 1 TABLET BY MOUTH ONCE DAILY AS NEEDED 30 tablet 97         REVIEW OF SYSTEMS:  4 point ROS including Respiratory, CV, GI and , other than that noted in the HPI,  is negative    Objective:  Temp 97  F (36.1  C)   Wt 40.8 kg (90 lb)   BMI 18.18 kg/m    Exam:  GENERAL APPEARANCE:  Alert, in no distress  ENT:  [place], oropharynx clear  EYES:  EOM normal, conjunctiva and lids normal  NECK: no adenopathy,masses or thyromegaly  RESP:  diminished breath sounds bilaterally, no crackles or wheezes  CV:  regular rate and rhythm, no murmur, +2 pedal pulses, peripheral edema trace+ in both LE  ABDOMEN:  soft, non-tender, no distension, no masses  M/S:   sitting on cough, severe kyphosis. LIZARRAGA with good strength. No joint inflammation  SKIN:  stasis discoloration both LE, no erythema. Small dry raised lesion on both inner ankles, skin intact.   PSYCH:  oriented to self, , place, insight and judgement impaired, memory impaired , affect and mood normal    Labs:   Recent labs in [facility] reviewed by me today.     ASSESSMENT/PLAN:  (G30.1,  F02.80) Late onset Alzheimer's disease without behavioral disturbance (H)  (primary  encounter diagnosis)   Comment: severe deficits with poor functional status.   SLUMS 9/30, CPT 4.1/5.6. Appears to be managing fairly well in AL with her .   Plan: AL staff assistance with cares, meals and med admin.     (I48.0) Paroxysmal atrial fibrillation (H)  Comment: AP 74.CHADS-VASc of 4. Not anticoagulated per patient preference   Plan: continue carvedilol.      (I87.2) Venous stasis dermatitis of both lower extremities  (I89.0) Lymphedema  Comment: minimal edema on exam. Dry raised lesions of both inner ankles appears to be scar tissue or callus. Skin is intact   Plan: closely monitor for breakdown     (M48.061) Spinal stenosis of lumbar region without neurogenic claudication  (S32.000S) Lumbar compression fracture, sequela  (G89.4) Chronic pain disorder  Comment: pain appears controlled. Opioid dependent for many years   Plan: continue lidocaine patch, tylenol, dilaudid 2 mg tid. Continue calcium and vitamin D. Wheelchair for mobility     (I10) Benign essential hypertension  Comment: no recent VS to review   Plan: continue carvedilol. Monitor VS per facility protocol       Electronically signed by:  TASHIA Youngblood CNP

## 2021-12-29 NOTE — LETTER
"    12/29/2021        RE: Litzy Kenny  C/o Ben Kenny  [address] [facility] [address]        [place] [facility]  [place] Medical Record Number:  [id number]  [facility] [[id number][facility]  Chief Complaint   Patient presents with     RECHECK       HPI:    Litzy Kenny  is a 89 year old (4/10/1932), who is being seen today for an episodic care visit.  HPI information obtained from: facility chart records, facility staff, patient report and [facility] chart review.   She and her  have lived at this facility since 1/2020. Medical history significant for dementia, chronic LBP, chronic compression fractures, opioid dependent, paroxsymal afib, bilateral LE edema.    Today's concern is:     Late onset Alzheimer's disease without behavioral disturbance (H)  Paroxysmal atrial fibrillation (H)  Venous stasis dermatitis of both lower extremities  Lymphedema  Spinal stenosis of lumbar region without neurogenic claudication  Lumbar compression fracture, sequela  Chronic pain disorder  Benign essential hypertension   She is seen today to check 2 areas on her feet, as requested by her . Patient is a fair historian. Reports \"constant\" back and leg pain, unchanged from usual. Denies pain or open areas of her feet. Good appetite. She stays on the couch, including sleeping there at night. Uses a wheelchair for toileting and cares. Incontinent of urine. Staff assists with hygiene. She and her  have meals delivered to their apt.     Past Medical and Surgical History reviewed in Epic today.    MEDICATIONS:    Current Outpatient Medications   Medication Sig Dispense Refill     ACETAMINOPHEN EXTRA STRENGTH 500 MG tablet TAKE TWO TABLETS (1000MG) BY MOUTH THREE TIMES DAILY 168 tablet 97     bisacodyl (DULCOLAX) 10 MG suppository UNWRAP AND INSERT ONE SUPPOSITORY RECTALLY ONCE DAILY AS NEEDED FOR CONSTIPATION 10 suppository 97     CALCIUM " + VITAMIN D3 600-10 MG-MCG TABS TAKE 1 TABLET BY MOUTH TWICE DAILY 56 tablet 97     carvedilol (COREG) 12.5 MG tablet TAKE 1 TABLET BY MOUTH TWICE DAILY 56 tablet PRN     Cetaphil Moisturizing (CETAPHIL) external lotion APPLY TOPICALLY TWICE DAILY 473 mL 97     Cholecalciferol (VITAMIN D3) 50 MCG (2000 UT) TABS TAKE TWO TABLETS (100MCG / 4000 UNITS) BY MOUTH ONCE DAILY 56 tablet PRN     HYDROmorphone (DILAUDID) 2 MG tablet TAKE 1 TABLET BY MOUTH THREE TIMES DAILY 90 tablet 0     Lidocaine (LIDOCARE) 4 % Patch Place 1 patch onto the skin every 24 hours To back for 12 hrs daily 5 patch 3     miconazole with skin protectant (SIGIFREDO ANTIFUNGAL) 2 % CREA cream APPLY TOPICALLY TO BUTTOCKS TWICE DAILY;& AS NEEDED 142 g 97     MYRBETRIQ 50 MG 24 hr tablet TAKE 1 TABLET BY MOUTH ONCE DAILY 28 tablet 97     NYSTOP [id number] UNIT/GM powder APPLY TOPICALLY TO FEET IN THE MORNING 60 g 97     sennosides (SENOKOT) 8.6 MG tablet TAKE 1 TABLET BY MOUTH ONCE DAILY AS NEEDED 30 tablet 97         REVIEW OF SYSTEMS:  4 point ROS including Respiratory, CV, GI and , other than that noted in the HPI,  is negative    Objective:  Temp 97  F (36.1  C)   Wt 40.8 kg (90 lb)   BMI 18.18 kg/m    Exam:  GENERAL APPEARANCE:  Alert, in no distress  ENT:  [place], oropharynx clear  EYES:  EOM normal, conjunctiva and lids normal  NECK: no adenopathy,masses or thyromegaly  RESP:  diminished breath sounds bilaterally, no crackles or wheezes  CV:  regular rate and rhythm, no murmur, +2 pedal pulses, peripheral edema trace+ in both LE  ABDOMEN:  soft, non-tender, no distension, no masses  M/S:   sitting on cough, severe kyphosis. LIZARRAGA with good strength. No joint inflammation  SKIN:  stasis discoloration both LE, no erythema. Small dry raised lesion on both inner ankles, skin intact.   PSYCH:  oriented to self, , place, insight and judgement impaired, memory impaired , affect and mood normal    Labs:   Recent labs in [facility] reviewed by me today.      ASSESSMENT/PLAN:  (G30.1,  F02.80) Late onset Alzheimer's disease without behavioral disturbance (H)  (primary encounter diagnosis)   Comment: severe deficits with poor functional status.   SLUMS 9/30, CPT 4.1/5.6. Appears to be managing fairly well in AL with her .   Plan: AL staff assistance with cares, meals and med admin.     (I48.0) Paroxysmal atrial fibrillation (H)  Comment: AP 74.CHADS-VASc of 4. Not anticoagulated per patient preference   Plan: continue carvedilol.      (I87.2) Venous stasis dermatitis of both lower extremities  (I89.0) Lymphedema  Comment: minimal edema on exam. Dry raised lesions of both inner ankles appears to be scar tissue or callus. Skin is intact   Plan: closely monitor for breakdown     (M48.061) Spinal stenosis of lumbar region without neurogenic claudication  (S32.000S) Lumbar compression fracture, sequela  (G89.4) Chronic pain disorder  Comment: pain appears controlled. Opioid dependent for many years   Plan: continue lidocaine patch, tylenol, dilaudid 2 mg tid. Continue calcium and vitamin D. Wheelchair for mobility     (I10) Benign essential hypertension  Comment: no recent VS to review   Plan: continue carvedilol. Monitor VS per facility protocol       Electronically signed by:  TASHIA Youngblood CNP                 Sincerely,        TASHIA Youngblood CNP

## 2022-01-01 ENCOUNTER — LAB REQUISITION (OUTPATIENT)
Dept: LAB | Facility: CLINIC | Age: 87
End: 2022-01-01
Payer: MEDICARE

## 2022-01-01 ENCOUNTER — ASSISTED LIVING VISIT (OUTPATIENT)
Dept: GERIATRICS | Facility: CLINIC | Age: 87
End: 2022-01-01
Payer: MEDICARE

## 2022-01-01 VITALS
DIASTOLIC BLOOD PRESSURE: 60 MMHG | WEIGHT: 87.8 LBS | HEIGHT: 59 IN | HEART RATE: 88 BPM | SYSTOLIC BLOOD PRESSURE: 120 MMHG | BODY MASS INDEX: 17.7 KG/M2 | OXYGEN SATURATION: 94 % | RESPIRATION RATE: 16 BRPM | TEMPERATURE: 49.8 F

## 2022-01-01 VITALS
OXYGEN SATURATION: 95 % | TEMPERATURE: 98.2 F | BODY MASS INDEX: 18.14 KG/M2 | HEART RATE: 79 BPM | DIASTOLIC BLOOD PRESSURE: 49 MMHG | WEIGHT: 90 LBS | HEIGHT: 59 IN | SYSTOLIC BLOOD PRESSURE: 87 MMHG | RESPIRATION RATE: 20 BRPM

## 2022-01-01 VITALS
SYSTOLIC BLOOD PRESSURE: 118 MMHG | WEIGHT: 90 LBS | RESPIRATION RATE: 18 BRPM | HEIGHT: 59 IN | BODY MASS INDEX: 18.14 KG/M2 | HEART RATE: 80 BPM | TEMPERATURE: 97.8 F | DIASTOLIC BLOOD PRESSURE: 51 MMHG | OXYGEN SATURATION: 93 %

## 2022-01-01 VITALS
RESPIRATION RATE: 20 BRPM | OXYGEN SATURATION: 96 % | TEMPERATURE: 97.9 F | HEIGHT: 59 IN | BODY MASS INDEX: 18.14 KG/M2 | WEIGHT: 90 LBS | HEART RATE: 81 BPM | SYSTOLIC BLOOD PRESSURE: 137 MMHG | DIASTOLIC BLOOD PRESSURE: 69 MMHG

## 2022-01-01 VITALS
DIASTOLIC BLOOD PRESSURE: 56 MMHG | TEMPERATURE: 97.4 F | HEART RATE: 92 BPM | SYSTOLIC BLOOD PRESSURE: 113 MMHG | BODY MASS INDEX: 18.14 KG/M2 | OXYGEN SATURATION: 97 % | RESPIRATION RATE: 16 BRPM | WEIGHT: 90 LBS | HEIGHT: 59 IN

## 2022-01-01 VITALS
SYSTOLIC BLOOD PRESSURE: 120 MMHG | TEMPERATURE: 97.9 F | HEART RATE: 88 BPM | DIASTOLIC BLOOD PRESSURE: 60 MMHG | RESPIRATION RATE: 16 BRPM | BODY MASS INDEX: 17.7 KG/M2 | WEIGHT: 87.8 LBS | HEIGHT: 59 IN

## 2022-01-01 DIAGNOSIS — G30.9 DEMENTIA IN ALZHEIMER'S DISEASE (H): ICD-10-CM

## 2022-01-01 DIAGNOSIS — I48.0 PAROXYSMAL ATRIAL FIBRILLATION (H): ICD-10-CM

## 2022-01-01 DIAGNOSIS — D64.9 ANEMIA, UNSPECIFIED: ICD-10-CM

## 2022-01-01 DIAGNOSIS — E55.9 VITAMIN D DEFICIENCY: Primary | ICD-10-CM

## 2022-01-01 DIAGNOSIS — R52 PAIN: ICD-10-CM

## 2022-01-01 DIAGNOSIS — I48.0 PAF (PAROXYSMAL ATRIAL FIBRILLATION) (H): ICD-10-CM

## 2022-01-01 DIAGNOSIS — D72.829 ELEVATED WHITE BLOOD CELL COUNT, UNSPECIFIED: ICD-10-CM

## 2022-01-01 DIAGNOSIS — E44.0 MODERATE PROTEIN-CALORIE MALNUTRITION (H): Primary | ICD-10-CM

## 2022-01-01 DIAGNOSIS — S32.010D COMPRESSION FRACTURE OF L1 VERTEBRA WITH ROUTINE HEALING, SUBSEQUENT ENCOUNTER: ICD-10-CM

## 2022-01-01 DIAGNOSIS — D72.828 OTHER ELEVATED WHITE BLOOD CELL (WBC) COUNT: ICD-10-CM

## 2022-01-01 DIAGNOSIS — F02.80 DEMENTIA IN ALZHEIMER'S DISEASE (H): ICD-10-CM

## 2022-01-01 DIAGNOSIS — M54.50 CHRONIC BILATERAL LOW BACK PAIN WITHOUT SCIATICA: ICD-10-CM

## 2022-01-01 DIAGNOSIS — R53.1 WEAKNESS: ICD-10-CM

## 2022-01-01 DIAGNOSIS — S32.000S LUMBAR COMPRESSION FRACTURE, SEQUELA: ICD-10-CM

## 2022-01-01 DIAGNOSIS — H11.32 SCLERAL HEMORRHAGE OF LEFT EYE: Primary | ICD-10-CM

## 2022-01-01 DIAGNOSIS — H05.232 HEMORRHAGE OF LEFT ORBIT: ICD-10-CM

## 2022-01-01 DIAGNOSIS — D64.9 ANEMIA, UNSPECIFIED TYPE: Primary | ICD-10-CM

## 2022-01-01 DIAGNOSIS — H04.129 DRY EYE: ICD-10-CM

## 2022-01-01 DIAGNOSIS — M48.061 SPINAL STENOSIS OF LUMBAR REGION WITHOUT NEUROGENIC CLAUDICATION: ICD-10-CM

## 2022-01-01 DIAGNOSIS — R21 RASH: Primary | ICD-10-CM

## 2022-01-01 DIAGNOSIS — T14.90XD HEALING WOUND: ICD-10-CM

## 2022-01-01 DIAGNOSIS — D64.9 ANEMIA, UNSPECIFIED TYPE: ICD-10-CM

## 2022-01-01 DIAGNOSIS — G89.29 CHRONIC BILATERAL LOW BACK PAIN WITHOUT SCIATICA: ICD-10-CM

## 2022-01-01 DIAGNOSIS — E44.0 MODERATE PROTEIN-CALORIE MALNUTRITION (H): ICD-10-CM

## 2022-01-01 DIAGNOSIS — R53.83 OTHER FATIGUE: ICD-10-CM

## 2022-01-01 DIAGNOSIS — H11.32: ICD-10-CM

## 2022-01-01 DIAGNOSIS — F22 PARANOIA (H): ICD-10-CM

## 2022-01-01 DIAGNOSIS — K59.01 SLOW TRANSIT CONSTIPATION: ICD-10-CM

## 2022-01-01 DIAGNOSIS — Z60.4 ISOLATION, SOCIAL: ICD-10-CM

## 2022-01-01 DIAGNOSIS — H11.32: Primary | ICD-10-CM

## 2022-01-01 LAB
ALBUMIN SERPL BCG-MCNC: 3.8 G/DL (ref 3.5–5.2)
ALBUMIN UR-MCNC: 20 MG/DL
ALP SERPL-CCNC: 28 U/L (ref 35–104)
ALT SERPL W P-5'-P-CCNC: 6 U/L (ref 10–35)
ANION GAP SERPL CALCULATED.3IONS-SCNC: 13 MMOL/L (ref 7–15)
APPEARANCE UR: ABNORMAL
AST SERPL W P-5'-P-CCNC: 21 U/L (ref 10–35)
BACTERIA UR CULT: NORMAL
BASOPHILS # BLD AUTO: 0.1 10E3/UL (ref 0–0.2)
BASOPHILS # BLD AUTO: 0.1 10E3/UL (ref 0–0.2)
BASOPHILS NFR BLD AUTO: 0 %
BASOPHILS NFR BLD AUTO: 1 %
BILIRUB SERPL-MCNC: 0.5 MG/DL
BILIRUB UR QL STRIP: NEGATIVE
BUN SERPL-MCNC: 24.5 MG/DL (ref 8–23)
CALCIUM SERPL-MCNC: 9.2 MG/DL (ref 8.2–9.6)
CAOX CRY #/AREA URNS HPF: ABNORMAL /HPF
CHLORIDE SERPL-SCNC: 95 MMOL/L (ref 98–107)
COLOR UR AUTO: ABNORMAL
CREAT SERPL-MCNC: 0.79 MG/DL (ref 0.51–0.95)
DEPRECATED CALCIDIOL+CALCIFEROL SERPL-MC: 104 UG/L (ref 20–75)
DEPRECATED HCO3 PLAS-SCNC: 26 MMOL/L (ref 22–29)
EOSINOPHIL # BLD AUTO: 0.1 10E3/UL (ref 0–0.7)
EOSINOPHIL # BLD AUTO: 0.1 10E3/UL (ref 0–0.7)
EOSINOPHIL NFR BLD AUTO: 0 %
EOSINOPHIL NFR BLD AUTO: 1 %
ERYTHROCYTE [DISTWIDTH] IN BLOOD BY AUTOMATED COUNT: 12.9 % (ref 10–15)
ERYTHROCYTE [DISTWIDTH] IN BLOOD BY AUTOMATED COUNT: 13.4 % (ref 10–15)
ERYTHROCYTE [DISTWIDTH] IN BLOOD BY AUTOMATED COUNT: 13.5 % (ref 10–15)
GFR SERPL CREATININE-BSD FRML MDRD: 71 ML/MIN/1.73M2
GLUCOSE SERPL-MCNC: 77 MG/DL (ref 70–99)
GLUCOSE UR STRIP-MCNC: NEGATIVE MG/DL
HCT VFR BLD AUTO: 31.2 % (ref 35–47)
HCT VFR BLD AUTO: 32 % (ref 35–47)
HCT VFR BLD AUTO: 33.8 % (ref 35–47)
HGB BLD-MCNC: 10.3 G/DL (ref 11.7–15.7)
HGB BLD-MCNC: 10.6 G/DL (ref 11.7–15.7)
HGB BLD-MCNC: 9.8 G/DL (ref 11.7–15.7)
HGB UR QL STRIP: NEGATIVE
IMM GRANULOCYTES # BLD: 0 10E3/UL
IMM GRANULOCYTES # BLD: 0.1 10E3/UL
IMM GRANULOCYTES NFR BLD: 0 %
IMM GRANULOCYTES NFR BLD: 0 %
KETONES UR STRIP-MCNC: NEGATIVE MG/DL
LEUKOCYTE ESTERASE UR QL STRIP: ABNORMAL
LYMPHOCYTES # BLD AUTO: 2.4 10E3/UL (ref 0.8–5.3)
LYMPHOCYTES # BLD AUTO: 2.8 10E3/UL (ref 0.8–5.3)
LYMPHOCYTES NFR BLD AUTO: 19 %
LYMPHOCYTES NFR BLD AUTO: 24 %
MCH RBC QN AUTO: 32 PG (ref 26.5–33)
MCH RBC QN AUTO: 32 PG (ref 26.5–33)
MCH RBC QN AUTO: 32.3 PG (ref 26.5–33)
MCHC RBC AUTO-ENTMCNC: 31.4 G/DL (ref 31.5–36.5)
MCHC RBC AUTO-ENTMCNC: 31.4 G/DL (ref 31.5–36.5)
MCHC RBC AUTO-ENTMCNC: 32.2 G/DL (ref 31.5–36.5)
MCV RBC AUTO: 100 FL (ref 78–100)
MCV RBC AUTO: 102 FL (ref 78–100)
MCV RBC AUTO: 102 FL (ref 78–100)
MONOCYTES # BLD AUTO: 0.9 10E3/UL (ref 0–1.3)
MONOCYTES # BLD AUTO: 1.1 10E3/UL (ref 0–1.3)
MONOCYTES NFR BLD AUTO: 7 %
MONOCYTES NFR BLD AUTO: 9 %
MUCOUS THREADS #/AREA URNS LPF: PRESENT /LPF
NEUTROPHILS # BLD AUTO: 11 10E3/UL (ref 1.6–8.3)
NEUTROPHILS # BLD AUTO: 6.5 10E3/UL (ref 1.6–8.3)
NEUTROPHILS NFR BLD AUTO: 65 %
NEUTROPHILS NFR BLD AUTO: 74 %
NITRATE UR QL: NEGATIVE
NRBC # BLD AUTO: 0 10E3/UL
NRBC # BLD AUTO: 0 10E3/UL
NRBC BLD AUTO-RTO: 0 /100
NRBC BLD AUTO-RTO: 0 /100
PH UR STRIP: 5.5 [PH] (ref 5–7)
PLATELET # BLD AUTO: 356 10E3/UL (ref 150–450)
PLATELET # BLD AUTO: 362 10E3/UL (ref 150–450)
PLATELET # BLD AUTO: 374 10E3/UL (ref 150–450)
POTASSIUM SERPL-SCNC: 4.6 MMOL/L (ref 3.4–5.3)
PROT SERPL-MCNC: 6.9 G/DL (ref 6.4–8.3)
RBC # BLD AUTO: 3.06 10E6/UL (ref 3.8–5.2)
RBC # BLD AUTO: 3.19 10E6/UL (ref 3.8–5.2)
RBC # BLD AUTO: 3.31 10E6/UL (ref 3.8–5.2)
RBC URINE: 0 /HPF
SODIUM SERPL-SCNC: 134 MMOL/L (ref 136–145)
SP GR UR STRIP: 1.03 (ref 1–1.03)
SQUAMOUS EPITHELIAL: <1 /HPF
TRANSITIONAL EPI: <1 /HPF
UROBILINOGEN UR STRIP-MCNC: NORMAL MG/DL
VIT B12 SERPL-MCNC: 455 PG/ML (ref 232–1245)
VIT B12 SERPL-MCNC: 497 PG/ML (ref 232–1245)
WBC # BLD AUTO: 10.1 10E3/UL (ref 4–11)
WBC # BLD AUTO: 11.7 10E3/UL (ref 4–11)
WBC # BLD AUTO: 15 10E3/UL (ref 4–11)
WBC URINE: 17 /HPF

## 2022-01-01 PROCEDURE — 80053 COMPREHEN METABOLIC PANEL: CPT | Mod: ORL | Performed by: INTERNAL MEDICINE

## 2022-01-01 PROCEDURE — 85027 COMPLETE CBC AUTOMATED: CPT | Mod: ORL | Performed by: INTERNAL MEDICINE

## 2022-01-01 PROCEDURE — P9603 ONE-WAY ALLOW PRORATED MILES: HCPCS | Mod: ORL | Performed by: NURSE PRACTITIONER

## 2022-01-01 PROCEDURE — 82607 VITAMIN B-12: CPT | Mod: ORL | Performed by: NURSE PRACTITIONER

## 2022-01-01 PROCEDURE — 36415 COLL VENOUS BLD VENIPUNCTURE: CPT | Mod: ORL | Performed by: NURSE PRACTITIONER

## 2022-01-01 PROCEDURE — 87086 URINE CULTURE/COLONY COUNT: CPT | Mod: ORL | Performed by: NURSE PRACTITIONER

## 2022-01-01 PROCEDURE — 82607 VITAMIN B-12: CPT | Mod: ORL | Performed by: INTERNAL MEDICINE

## 2022-01-01 PROCEDURE — 81001 URINALYSIS AUTO W/SCOPE: CPT | Mod: ORL | Performed by: NURSE PRACTITIONER

## 2022-01-01 PROCEDURE — 85025 COMPLETE CBC W/AUTO DIFF WBC: CPT | Mod: ORL | Performed by: NURSE PRACTITIONER

## 2022-01-01 PROCEDURE — P9603 ONE-WAY ALLOW PRORATED MILES: HCPCS | Mod: ORL | Performed by: INTERNAL MEDICINE

## 2022-01-01 PROCEDURE — 82306 VITAMIN D 25 HYDROXY: CPT | Mod: ORL | Performed by: NURSE PRACTITIONER

## 2022-01-01 PROCEDURE — 36415 COLL VENOUS BLD VENIPUNCTURE: CPT | Mod: ORL | Performed by: INTERNAL MEDICINE

## 2022-01-01 RX ORDER — HYDROMORPHONE HYDROCHLORIDE 2 MG/1
2 TABLET ORAL 3 TIMES DAILY
Qty: 90 TABLET | Refills: 0 | Status: SHIPPED | OUTPATIENT
Start: 2022-01-01 | End: 2023-01-01

## 2022-01-01 RX ORDER — CARVEDILOL 6.25 MG/1
TABLET ORAL
Qty: 180 TABLET | Refills: 97 | Status: SHIPPED | OUTPATIENT
Start: 2022-01-01

## 2022-01-01 RX ORDER — HYDROMORPHONE HYDROCHLORIDE 2 MG/1
2 TABLET ORAL 3 TIMES DAILY
Qty: 90 TABLET | Refills: 0 | Status: SHIPPED | OUTPATIENT
Start: 2022-01-01 | End: 2022-01-01

## 2022-01-01 RX ORDER — FERROUS SULFATE 325(65) MG
325 TABLET ORAL EVERY OTHER DAY
Start: 2022-01-01

## 2022-01-01 RX ORDER — OMEPRAZOLE 40 MG/1
40 CAPSULE, DELAYED RELEASE ORAL DAILY
Qty: 60 CAPSULE | Refills: 0
Start: 2022-01-01 | End: 2022-01-01

## 2022-01-01 RX ORDER — HYDROMORPHONE HYDROCHLORIDE 2 MG/1
TABLET ORAL
Qty: 90 TABLET | Refills: 0 | Status: SHIPPED | OUTPATIENT
Start: 2022-01-01 | End: 2022-01-01

## 2022-01-01 RX ORDER — CIPROFLOXACIN 250 MG/1
250 TABLET, FILM COATED ORAL 2 TIMES DAILY
Qty: 10 TABLET | Refills: 0
Start: 2022-01-01 | End: 2022-01-01

## 2022-01-01 RX ORDER — FERROUS SULFATE 325(65) MG
TABLET, DELAYED RELEASE (ENTERIC COATED) ORAL
Qty: 14 TABLET | Refills: 97 | Status: SHIPPED | OUTPATIENT
Start: 2022-01-01

## 2022-01-01 RX ORDER — SENNOSIDES 8.6 MG
1 TABLET ORAL DAILY PRN
Qty: 30 TABLET | Refills: 97 | Status: SHIPPED | OUTPATIENT
Start: 2022-01-01

## 2022-01-01 SDOH — SOCIAL STABILITY - SOCIAL INSECURITY: SOCIAL EXCLUSION AND REJECTION: Z60.4

## 2022-01-06 PROCEDURE — U0005 INFEC AGEN DETEC AMPLI PROBE: HCPCS | Mod: ORL | Performed by: INTERNAL MEDICINE

## 2022-01-07 ENCOUNTER — LAB REQUISITION (OUTPATIENT)
Dept: LAB | Facility: CLINIC | Age: 87
End: 2022-01-07
Payer: MEDICARE

## 2022-01-07 DIAGNOSIS — U07.1 COVID-19: ICD-10-CM

## 2022-01-07 LAB — SARS-COV-2 RNA RESP QL NAA+PROBE: NEGATIVE

## 2022-01-12 ENCOUNTER — LAB REQUISITION (OUTPATIENT)
Dept: LAB | Facility: CLINIC | Age: 87
End: 2022-01-12
Payer: MEDICARE

## 2022-01-12 DIAGNOSIS — U07.1 COVID-19: ICD-10-CM

## 2022-01-18 ENCOUNTER — LAB REQUISITION (OUTPATIENT)
Dept: LAB | Facility: CLINIC | Age: 87
End: 2022-01-18
Payer: MEDICARE

## 2022-01-18 DIAGNOSIS — U07.1 COVID-19: ICD-10-CM

## 2022-01-19 DIAGNOSIS — S32.010D COMPRESSION FRACTURE OF L1 VERTEBRA WITH ROUTINE HEALING, SUBSEQUENT ENCOUNTER: ICD-10-CM

## 2022-01-20 RX ORDER — HYDROMORPHONE HYDROCHLORIDE 2 MG/1
TABLET ORAL
Qty: 90 TABLET | Refills: 0 | Status: SHIPPED | OUTPATIENT
Start: 2022-01-20 | End: 2022-02-16

## 2022-01-25 DIAGNOSIS — M54.50 CHRONIC BILATERAL LOW BACK PAIN WITHOUT SCIATICA: ICD-10-CM

## 2022-01-25 DIAGNOSIS — G89.29 CHRONIC BILATERAL LOW BACK PAIN WITHOUT SCIATICA: ICD-10-CM

## 2022-01-25 RX ORDER — LIDOCAINE 4 G/G
1 PATCH TOPICAL EVERY 24 HOURS
Qty: 5 PATCH | Refills: 3 | Status: SHIPPED | OUTPATIENT
Start: 2022-01-25 | End: 2022-03-07

## 2022-01-27 PROCEDURE — U0003 INFECTIOUS AGENT DETECTION BY NUCLEIC ACID (DNA OR RNA); SEVERE ACUTE RESPIRATORY SYNDROME CORONAVIRUS 2 (SARS-COV-2) (CORONAVIRUS DISEASE [COVID-19]), AMPLIFIED PROBE TECHNIQUE, MAKING USE OF HIGH THROUGHPUT TECHNOLOGIES AS DESCRIBED BY CMS-2020-01-R: HCPCS | Mod: ORL | Performed by: INTERNAL MEDICINE

## 2022-01-28 ENCOUNTER — LAB REQUISITION (OUTPATIENT)
Dept: LAB | Facility: CLINIC | Age: 87
End: 2022-01-28
Payer: MEDICARE

## 2022-01-28 DIAGNOSIS — U07.1 COVID-19: ICD-10-CM

## 2022-01-30 LAB — SARS-COV-2 RNA RESP QL NAA+PROBE: NEGATIVE

## 2022-02-02 PROCEDURE — U0003 INFECTIOUS AGENT DETECTION BY NUCLEIC ACID (DNA OR RNA); SEVERE ACUTE RESPIRATORY SYNDROME CORONAVIRUS 2 (SARS-COV-2) (CORONAVIRUS DISEASE [COVID-19]), AMPLIFIED PROBE TECHNIQUE, MAKING USE OF HIGH THROUGHPUT TECHNOLOGIES AS DESCRIBED BY CMS-2020-01-R: HCPCS | Mod: ORL | Performed by: INTERNAL MEDICINE

## 2022-02-03 ENCOUNTER — LAB REQUISITION (OUTPATIENT)
Dept: LAB | Facility: CLINIC | Age: 87
End: 2022-02-03
Payer: MEDICARE

## 2022-02-03 DIAGNOSIS — U07.1 COVID-19: ICD-10-CM

## 2022-02-04 LAB — SARS-COV-2 RNA RESP QL NAA+PROBE: NOT DETECTED

## 2022-02-07 ENCOUNTER — LAB REQUISITION (OUTPATIENT)
Dept: LAB | Facility: CLINIC | Age: 87
End: 2022-02-07
Payer: MEDICARE

## 2022-02-07 DIAGNOSIS — U07.1 COVID-19: ICD-10-CM

## 2022-02-08 PROCEDURE — U0005 INFEC AGEN DETEC AMPLI PROBE: HCPCS | Mod: ORL | Performed by: INTERNAL MEDICINE

## 2022-02-10 LAB — SARS-COV-2 RNA RESP QL NAA+PROBE: NEGATIVE

## 2022-02-15 ENCOUNTER — LAB REQUISITION (OUTPATIENT)
Dept: LAB | Facility: CLINIC | Age: 87
End: 2022-02-15
Payer: MEDICARE

## 2022-02-15 DIAGNOSIS — U07.1 COVID-19: ICD-10-CM

## 2022-02-16 DIAGNOSIS — S32.010D COMPRESSION FRACTURE OF L1 VERTEBRA WITH ROUTINE HEALING, SUBSEQUENT ENCOUNTER: ICD-10-CM

## 2022-02-16 RX ORDER — HYDROMORPHONE HYDROCHLORIDE 2 MG/1
TABLET ORAL
Qty: 90 TABLET | Refills: 0 | Status: SHIPPED | OUTPATIENT
Start: 2022-02-16 | End: 2022-03-19

## 2022-02-22 ENCOUNTER — LAB REQUISITION (OUTPATIENT)
Dept: LAB | Facility: CLINIC | Age: 87
End: 2022-02-22
Payer: MEDICARE

## 2022-02-22 DIAGNOSIS — U07.1 COVID-19: ICD-10-CM

## 2022-02-22 PROCEDURE — U0005 INFEC AGEN DETEC AMPLI PROBE: HCPCS | Mod: ORL | Performed by: INTERNAL MEDICINE

## 2022-02-23 LAB — SARS-COV-2 RNA RESP QL NAA+PROBE: NEGATIVE

## 2022-02-27 ENCOUNTER — HEALTH MAINTENANCE LETTER (OUTPATIENT)
Age: 87
End: 2022-02-27

## 2022-03-07 DIAGNOSIS — M54.50 CHRONIC BILATERAL LOW BACK PAIN WITHOUT SCIATICA: ICD-10-CM

## 2022-03-07 DIAGNOSIS — G89.29 CHRONIC BILATERAL LOW BACK PAIN WITHOUT SCIATICA: ICD-10-CM

## 2022-03-07 RX ORDER — LIDOCAINE 4 G/G
1 PATCH TOPICAL EVERY 24 HOURS
Qty: 5 PATCH | Refills: 11 | Status: SHIPPED | OUTPATIENT
Start: 2022-03-07 | End: 2022-04-22

## 2022-03-16 ENCOUNTER — ASSISTED LIVING VISIT (OUTPATIENT)
Dept: GERIATRICS | Facility: CLINIC | Age: 87
End: 2022-03-16
Payer: MEDICARE

## 2022-03-16 VITALS
WEIGHT: 90 LBS | HEART RATE: 65 BPM | BODY MASS INDEX: 18.14 KG/M2 | DIASTOLIC BLOOD PRESSURE: 87 MMHG | SYSTOLIC BLOOD PRESSURE: 121 MMHG | HEIGHT: 59 IN | TEMPERATURE: 97.4 F

## 2022-03-16 DIAGNOSIS — G30.1 LATE ONSET ALZHEIMER'S DISEASE WITHOUT BEHAVIORAL DISTURBANCE (H): Primary | ICD-10-CM

## 2022-03-16 DIAGNOSIS — F22 PARANOIA (H): ICD-10-CM

## 2022-03-16 DIAGNOSIS — F02.80 LATE ONSET ALZHEIMER'S DISEASE WITHOUT BEHAVIORAL DISTURBANCE (H): Primary | ICD-10-CM

## 2022-03-16 NOTE — LETTER
"    3/16/2022        RE: Litzy Kenny  C/o Ben Kenny  [address] [facility] [facility] [address]        Chief Complaint: Cognitive concerns     HPI:    Litzy Kenny is a 89 year old  (4/10/1932), who is being seen today for an episodic care visit at [facility]). Today's concern is:  Patient in room with  Shan, resting on couch. HPI difficult to obtain 2/2 memory difficulties, spouse was able to answer questions.    -Couple non-compliant with exam, reports \"there has been two ladies in this room asking questions all day; get your information from them. I hate retelling everything\".   -Litzy very paranoid about questions asked and repeated \"Shan, what does she want from me or need, just answer everything because I cannot\".   -Some staffing concerns regarding safety of residing on 3rd floor AL.    -No recent falls per chart review.     BP Readings from Last 3 Encounters:   03/15/22 121/87   02/09/21 113/60   12/07/20 113/60     Pulse Readings from Last 4 Encounters:   03/15/22 65   02/09/21 89   12/07/20 89   11/17/20 89     Wt Readings from Last 4 Encounters:   01/21/22 40.8 kg (90 lb)   12/28/21 40.8 kg (90 lb)   11/10/21 40.8 kg (90 lb)   08/17/21 40.8 kg (90 lb)         REVIEW OF SYSTEMS:  Unobtainable secondary to cognitive impairment or aphasia, but today pt reports I am fine today    /87   Pulse 65   Temp 97.4  F (36.3  C)   Ht 1.499 m (4' 11\")   Wt 40.8 kg (90 lb)   BMI 18.18 kg/m    GENERAL APPEARANCE:  Alert,oriented x2 in no distress. Lungs CT, S1/S2 without M/G/R . +2 edema. Abdomen is soft, +BTS. No focal neurological deficits.      Assessment/Plan:   Diagnosis Comments   1. Late onset Alzheimer's disease without behavioral disturbance (H)  Chronic, progressing. Safety concerns noted regarding living alone with spouse.   -OT CPT testing   -Physical therapy consult- physical deconditioning    2.     3. Paranoia (H)  Spouse noted is new, will monitor closely and make " medication adjustments as indicated.              Electronically Signed by:  Galilea Meredith NP    Documentation of Face to Face and Certification for Home Health Services    I certify that patient: Litzy Kneny is under my care and that I, or a nurse practitioner or physician's assistant working with me, had a face-to-face encounter that meets the physician face-to-face encounter requirements with this patient on: 3/16/2022.    This encounter with the patient was in whole, or in part, for the following medical condition, which is the primary reason for home health care: cognitive changes .    I certify that, based on my findings, the following services are medically necessary home health services: Occupational Therapy and Physical Therapy.    My clinical findings support the need for the above services because: Occupational Therapy Services are needed to assess and treat cognitive ability and address ADL safety due to impairment in cognitive changes, CPT testing . and Physical Therapy Services are needed to assess and treat the following functional impairments: physical deconditioning .    Further, I certify that my clinical findings support that this patient is homebound (i.e. absences from home require considerable and taxing effort and are for medical reasons or [employer] services or infrequently or of short duration when for other reasons) because: Leaving home is medically contraindicated for the following reason(s): dementia..    Based on the above findings. I certify that this patient is confined to the home and needs intermittent skilled nursing care, physical therapy and/or speech therapy.  The patient is under my care, and I have initiated the establishment of the plan of care.  This patient will be followed by a physician who will periodically review the plan of care.  Physician/Provider to provide follow up care: Galilea Meredith    Attending hospital physician (the Medicare certified [place] provider):  Galilea Meredith NP  Physician Signature: See electronic signature associated with these discharge orders.  Date: 3/29/2022          Sincerely,        Galilea Meredith NP

## 2022-03-16 NOTE — PROGRESS NOTES
"Chief Complaint: Cognitive concerns     HPI:    Litzy Kenny is a 89 year old  (4/10/1932), who is being seen today for an episodic care visit at [facility]). Today's concern is:  Patient in room with  Shan, resting on couch. HPI difficult to obtain 2/2 memory difficulties, spouse was able to answer questions.    -Couple non-compliant with exam, reports \"there has been two ladies in this room asking questions all day; get your information from them. I hate retelling everything\".   -Litzy very paranoid about questions asked and repeated \"Shan, what does she want from me or need, just answer everything because I cannot\".   -Some staffing concerns regarding safety of residing on 3rd floor AL.    -No recent falls per chart review.     BP Readings from Last 3 Encounters:   03/15/22 121/87   02/09/21 113/60   12/07/20 113/60     Pulse Readings from Last 4 Encounters:   03/15/22 65   02/09/21 89   12/07/20 89   11/17/20 89     Wt Readings from Last 4 Encounters:   01/21/22 40.8 kg (90 lb)   12/28/21 40.8 kg (90 lb)   11/10/21 40.8 kg (90 lb)   08/17/21 40.8 kg (90 lb)         REVIEW OF SYSTEMS:  Unobtainable secondary to cognitive impairment or aphasia, but today pt reports I am fine today    /87   Pulse 65   Temp 97.4  F (36.3  C)   Ht 1.499 m (4' 11\")   Wt 40.8 kg (90 lb)   BMI 18.18 kg/m    GENERAL APPEARANCE:  Alert,oriented x2 in no distress. Lungs CT, S1/S2 without M/G/R . +2 edema. Abdomen is soft, +BTS. No focal neurological deficits.      Assessment/Plan:   Diagnosis Comments   1. Late onset Alzheimer's disease without behavioral disturbance (H)  Chronic, progressing. Safety concerns noted regarding living alone with spouse.   -OT CPT testing   -Physical therapy consult- physical deconditioning    2.     3. Paranoia (H)  Spouse noted is new, will monitor closely and make medication adjustments as indicated.              Electronically Signed by:  Galilea Meredith, " NP    Documentation of Face to Face and Certification for Home Health Services    I certify that patient: Litzy Kenny is under my care and that I, or a nurse practitioner or physician's assistant working with me, had a face-to-face encounter that meets the physician face-to-face encounter requirements with this patient on: 3/16/2022.    This encounter with the patient was in whole, or in part, for the following medical condition, which is the primary reason for home health care: cognitive changes .    I certify that, based on my findings, the following services are medically necessary home health services: Occupational Therapy and Physical Therapy.    My clinical findings support the need for the above services because: Occupational Therapy Services are needed to assess and treat cognitive ability and address ADL safety due to impairment in cognitive changes, CPT testing . and Physical Therapy Services are needed to assess and treat the following functional impairments: physical deconditioning .    Further, I certify that my clinical findings support that this patient is homebound (i.e. absences from home require considerable and taxing effort and are for medical reasons or [employer] services or infrequently or of short duration when for other reasons) because: Leaving home is medically contraindicated for the following reason(s): dementia..    Based on the above findings. I certify that this patient is confined to the home and needs intermittent skilled nursing care, physical therapy and/or speech therapy.  The patient is under my care, and I have initiated the establishment of the plan of care.  This patient will be followed by a physician who will periodically review the plan of care.  Physician/Provider to provide follow up care: Galilea Meredith    Attending hospital physician (the Medicare certified [place] provider): Galilea Meredith NP  Physician Signature: See electronic signature associated with these  discharge orders.  Date: 3/29/2022

## 2022-03-17 DIAGNOSIS — S32.010D COMPRESSION FRACTURE OF L1 VERTEBRA WITH ROUTINE HEALING, SUBSEQUENT ENCOUNTER: ICD-10-CM

## 2022-03-19 RX ORDER — HYDROMORPHONE HYDROCHLORIDE 2 MG/1
TABLET ORAL
Qty: 90 TABLET | Refills: 0 | Status: SHIPPED | OUTPATIENT
Start: 2022-03-19 | End: 2022-04-13

## 2022-04-01 ENCOUNTER — ASSISTED LIVING VISIT (OUTPATIENT)
Dept: GERIATRICS | Facility: CLINIC | Age: 87
End: 2022-04-01
Payer: MEDICARE

## 2022-04-01 VITALS
DIASTOLIC BLOOD PRESSURE: 77 MMHG | OXYGEN SATURATION: 96 % | TEMPERATURE: 97.4 F | HEART RATE: 77 BPM | SYSTOLIC BLOOD PRESSURE: 134 MMHG

## 2022-04-01 DIAGNOSIS — I48.0 PAROXYSMAL ATRIAL FIBRILLATION (H): ICD-10-CM

## 2022-04-01 DIAGNOSIS — F02.80 DEMENTIA IN ALZHEIMER'S DISEASE (H): ICD-10-CM

## 2022-04-01 DIAGNOSIS — E44.0 MODERATE PROTEIN-CALORIE MALNUTRITION (H): Primary | ICD-10-CM

## 2022-04-01 DIAGNOSIS — G30.9 DEMENTIA IN ALZHEIMER'S DISEASE (H): ICD-10-CM

## 2022-04-01 DIAGNOSIS — R30.0 DYSURIA: ICD-10-CM

## 2022-04-01 NOTE — PROGRESS NOTES
Chief Complaint: Cognitive decline, wraps     HPI:    Litzy Kenny is a 89 year old  (4/10/1932), who is being seen today for an episodic care visit at [facility]).     Today's concern is:  Patient resting on couch upon exam. Per  she is wheelchair bound and stays in one place throughout the day. Staff reports she is having increased cognitive impairment. Complaining of dysuria, staff assists with toileting. Has redness on coccyx and barrier cream being applied PRN.     BP Readings from Last 3 Encounters:   04/07/22 110/65   03/31/22 134/77   03/15/22 121/87     Pulse Readings from Last 4 Encounters:   04/07/22 100   03/31/22 77   03/15/22 65   02/09/21 89     REVIEW OF SYSTEMS:  Unobtainable secondary to cognitive impairment or aphasia, but today pt reports her bottom hurts    /77   Pulse 77   Temp 97.4  F (36.3  C)   SpO2 96%   GENERAL APPEARANCE:  Alert,oriented x 2 in no distress. Lungs CTA, S1/S2 without M/G/R . +2 edema bilateral LE. Abdomen is soft, +BTS. No focal neurological deficits.      Assessment/Plan:  (E44.0) Moderate protein-calorie malnutrition (H)  (primary encounter diagnosis)  Comment:   No BMI on file. Has dietary restriction 2/2 dental problems and lactose intolerance.   Plan:   -Weights weekly  -Encourage healthy meal choices    (I48.0) Paroxysmal atrial fibrillation (H)  Comment:   HR stable, taking carvedilol BID   Plan:   -Continue HR monitoring. Not anticoagulated per patient preference.     (R30.0) Dysuria  Comment: New Onset, no further s/sx of infection.   Plan:   -UA/UC  -monitor     (G30.9,  F02.80) Dementia in Alzheimer's disease (H)  Comment: Chronic, progressing. Staff requesting CPT score.   Plan:   -Occupational therapy consult for cognitive testing.   -Continue AL support with medication administration, meals and safety.       Orders:  1. UA/UC  2. Occupational therapy- cognitive testing   3. Ok to TubiGrips and farrow wraps.       Electronically Signed by:  Galilea Meredith NP    Documentation of Face to Face and Certification for Home Health Services    I certify that patient: Litzy Kenny is under my care and that I, or a nurse practitioner or physician's assistant working with me, had a face-to-face encounter that meets the physician face-to-face encounter requirements with this patient on: 4/1/2022.    This encounter with the patient was in whole, or in part, for the following medical condition, which is the primary reason for home health care: Cognitive progression.    I certify that, based on my findings, the following services are medically necessary home health services: Occupational Therapy and Physical Therapy.    My clinical findings support the need for the above services because: Occupational Therapy Services are needed to assess and treat cognitive ability and address ADL safety due to impairment in cognitive impairment; CPT testing . and Physical Therapy Services are needed to assess and treat the following functional impairments: Weakness.    Further, I certify that my clinical findings support that this patient is homebound (i.e. absences from home require considerable and taxing effort and are for medical reasons or [facility] services or infrequently or of short duration when for other reasons) because: Leaving home is medically contraindicated for the following reason(s): inability to leave/wheelchair bound ..    Based on the above findings. I certify that this patient is confined to the home and needs intermittent skilled nursing care, physical therapy and/or speech therapy.  The patient is under my care, and I have initiated the establishment of the plan of care.  This patient will be followed by a physician who will periodically review the plan of care.  Physician/Provider to provide follow up care: Galilea Meredith    Attending hospital physician (the Medicare certified [place] provider): Galilea Meredith  NP  Physician Signature: See electronic signature associated with these discharge orders.  Date: 4/11/2022

## 2022-04-07 NOTE — PROGRESS NOTES
Chief Complaint: Depressed mood    HPI:    Litzy Kenny is a 89 year old  (4/10/1932), who is being seen today for an episodic care visit at [facility] Today's concern is:  -Patient resting on couch upon exam. Unable to walk and WC assist at all times. Cognitive impairment is progressing and mood more down than usual.   -Continues to complain that her bottom hurts when voiding. Urine culture was not complete last time urine taken- lab mistake.   -Edema of bilateral LE noted. Denies SOB or chest pain today.     REVIEW OF SYSTEMS:  Unobtainable secondary to cognitive impairment or aphasia, but today pt reports my bottom hurst when I pee    /65   Pulse 100   Temp 97.9  F (36.6  C)   SpO2 95%   GENERAL APPEARANCE:  Alert,oriented x 2 in no distress. Lungs CTA, S1/S2 without M/G/R . +2 edema. Abdomen is soft, +BTS. No focal neurological deficits.      Assessment/Plan:  (F32.0) Current mild episode of major depressive disorder without prior episode (H)  (primary encounter diagnosis)  Comment: New, likely 2/2 progression of dementia and sedetary lifestyle.   Plan:   -Celexa 10 mg/day   -BMP in 2 weeks to monitor electrolytes     (R60.0) Edema of both lower extremities  Comment: Chronic, worsening with +2 edema, no open areas noted.   Plan:   -Lymphedema management   -Farrow wraps  -Monitor weights and for s/sx of fluid overflow     (G30.9,  F02.80) Dementia in Alzheimer's disease (H)  Comment: Chronic, progressive. Without behaviors   Plan:   -Chronic, progressive. Nursing to continue to help with medication management, nursing and meals. Resident appears to be doing well in AL setting     (R30.0) Dysuria  Comment: Acute.   Plan:   -UA/UC  -Monitor             Electronically Signed by:  Galilea Meredith NP

## 2022-04-08 ENCOUNTER — ASSISTED LIVING VISIT (OUTPATIENT)
Dept: GERIATRICS | Facility: CLINIC | Age: 87
End: 2022-04-08
Payer: MEDICARE

## 2022-04-08 VITALS
SYSTOLIC BLOOD PRESSURE: 110 MMHG | OXYGEN SATURATION: 95 % | HEART RATE: 100 BPM | TEMPERATURE: 97.9 F | DIASTOLIC BLOOD PRESSURE: 65 MMHG

## 2022-04-08 DIAGNOSIS — F02.80 DEMENTIA IN ALZHEIMER'S DISEASE (H): ICD-10-CM

## 2022-04-08 DIAGNOSIS — F32.0 CURRENT MILD EPISODE OF MAJOR DEPRESSIVE DISORDER WITHOUT PRIOR EPISODE (H): Primary | ICD-10-CM

## 2022-04-08 DIAGNOSIS — R30.0 DYSURIA: ICD-10-CM

## 2022-04-08 DIAGNOSIS — G30.9 DEMENTIA IN ALZHEIMER'S DISEASE (H): ICD-10-CM

## 2022-04-08 DIAGNOSIS — R60.0 EDEMA OF BOTH LOWER EXTREMITIES: ICD-10-CM

## 2022-04-09 ENCOUNTER — LAB REQUISITION (OUTPATIENT)
Dept: LAB | Facility: CLINIC | Age: 87
End: 2022-04-09
Payer: MEDICARE

## 2022-04-09 DIAGNOSIS — G30.9 DEMENTIA IN ALZHEIMER'S DISEASE (H): Primary | ICD-10-CM

## 2022-04-09 DIAGNOSIS — F02.80 DEMENTIA IN ALZHEIMER'S DISEASE (H): Primary | ICD-10-CM

## 2022-04-10 ENCOUNTER — LAB REQUISITION (OUTPATIENT)
Dept: LAB | Facility: CLINIC | Age: 87
End: 2022-04-10
Payer: MEDICARE

## 2022-04-10 DIAGNOSIS — N39.0 URINARY TRACT INFECTION, SITE NOT SPECIFIED: ICD-10-CM

## 2022-04-10 LAB
ALBUMIN UR-MCNC: 20 MG/DL
APPEARANCE UR: ABNORMAL
BACTERIA #/AREA URNS HPF: ABNORMAL /HPF
BILIRUB UR QL STRIP: NEGATIVE
COLOR UR AUTO: ABNORMAL
GLUCOSE UR STRIP-MCNC: NEGATIVE MG/DL
HGB UR QL STRIP: NEGATIVE
KETONES UR STRIP-MCNC: NEGATIVE MG/DL
LEUKOCYTE ESTERASE UR QL STRIP: NEGATIVE
MUCOUS THREADS #/AREA URNS LPF: PRESENT /LPF
NITRATE UR QL: NEGATIVE
PH UR STRIP: 6.5 [PH] (ref 5–7)
RBC URINE: 4 /HPF
SP GR UR STRIP: 1.03 (ref 1–1.03)
SQUAMOUS EPITHELIAL: 2 /HPF
UROBILINOGEN UR STRIP-MCNC: <2 MG/DL
WBC URINE: 2 /HPF

## 2022-04-10 PROCEDURE — 81001 URINALYSIS AUTO W/SCOPE: CPT | Mod: ORL | Performed by: NURSE PRACTITIONER

## 2022-04-11 DIAGNOSIS — S32.010D COMPRESSION FRACTURE OF L1 VERTEBRA WITH ROUTINE HEALING, SUBSEQUENT ENCOUNTER: ICD-10-CM

## 2022-04-11 DIAGNOSIS — N32.81 OVERACTIVE BLADDER: ICD-10-CM

## 2022-04-11 DIAGNOSIS — G89.29 CHRONIC BILATERAL LOW BACK PAIN WITHOUT SCIATICA: ICD-10-CM

## 2022-04-11 DIAGNOSIS — M54.50 CHRONIC BILATERAL LOW BACK PAIN WITHOUT SCIATICA: ICD-10-CM

## 2022-04-11 PROBLEM — L97.929 STASIS ULCER OF LEFT LOWER EXTREMITY (H): Status: RESOLVED | Noted: 2022-04-11 | Resolved: 2022-04-11

## 2022-04-11 PROBLEM — L97.929 STASIS ULCER OF LEFT LOWER EXTREMITY (H): Status: ACTIVE | Noted: 2022-04-11

## 2022-04-11 PROBLEM — I83.029 STASIS ULCER OF LEFT LOWER EXTREMITY (H): Status: ACTIVE | Noted: 2022-04-11

## 2022-04-11 PROBLEM — I83.029 STASIS ULCER OF LEFT LOWER EXTREMITY (H): Status: RESOLVED | Noted: 2022-04-11 | Resolved: 2022-04-11

## 2022-04-11 RX ORDER — ACETAMINOPHEN 500 MG
TABLET ORAL
Qty: 540 TABLET | Refills: 3 | Status: SHIPPED | OUTPATIENT
Start: 2022-04-11 | End: 2023-01-01

## 2022-04-11 RX ORDER — MIRABEGRON 50 MG/1
50 TABLET, EXTENDED RELEASE ORAL DAILY
Qty: 90 TABLET | Refills: 3 | Status: SHIPPED | OUTPATIENT
Start: 2022-04-11 | End: 2023-01-01

## 2022-04-11 RX ORDER — CITALOPRAM HYDROBROMIDE 10 MG/1
TABLET ORAL
Qty: 31 TABLET | Refills: 11 | Status: SHIPPED | OUTPATIENT
Start: 2022-04-11 | End: 2023-01-01

## 2022-04-11 RX ORDER — CHOLECALCIFEROL (VITAMIN D3) 50 MCG
TABLET ORAL
Qty: 180 TABLET | Refills: 3 | Status: SHIPPED | OUTPATIENT
Start: 2022-04-11 | End: 2022-01-01

## 2022-04-13 DIAGNOSIS — S32.010D COMPRESSION FRACTURE OF L1 VERTEBRA WITH ROUTINE HEALING, SUBSEQUENT ENCOUNTER: ICD-10-CM

## 2022-04-13 RX ORDER — HYDROMORPHONE HYDROCHLORIDE 2 MG/1
TABLET ORAL
Qty: 90 TABLET | Refills: 0 | Status: SHIPPED | OUTPATIENT
Start: 2022-04-13 | End: 2022-05-20

## 2022-04-14 PROCEDURE — 87088 URINE BACTERIA CULTURE: CPT | Mod: ORL | Performed by: NURSE PRACTITIONER

## 2022-04-15 ENCOUNTER — LAB REQUISITION (OUTPATIENT)
Dept: LAB | Facility: CLINIC | Age: 87
End: 2022-04-15
Payer: MEDICARE

## 2022-04-15 DIAGNOSIS — R35.0 FREQUENCY OF MICTURITION: ICD-10-CM

## 2022-04-15 PROCEDURE — U0003 INFECTIOUS AGENT DETECTION BY NUCLEIC ACID (DNA OR RNA); SEVERE ACUTE RESPIRATORY SYNDROME CORONAVIRUS 2 (SARS-COV-2) (CORONAVIRUS DISEASE [COVID-19]), AMPLIFIED PROBE TECHNIQUE, MAKING USE OF HIGH THROUGHPUT TECHNOLOGIES AS DESCRIBED BY CMS-2020-01-R: HCPCS | Mod: ORL | Performed by: INTERNAL MEDICINE

## 2022-04-16 ENCOUNTER — LAB REQUISITION (OUTPATIENT)
Dept: LAB | Facility: CLINIC | Age: 87
End: 2022-04-16
Payer: MEDICARE

## 2022-04-16 DIAGNOSIS — U07.1 COVID-19: ICD-10-CM

## 2022-04-16 LAB — SARS-COV-2 RNA RESP QL NAA+PROBE: NEGATIVE

## 2022-04-17 LAB
BACTERIA UR CULT: ABNORMAL
BACTERIA UR CULT: ABNORMAL

## 2022-04-18 ENCOUNTER — TELEPHONE (OUTPATIENT)
Dept: GERIATRICS | Facility: CLINIC | Age: 87
End: 2022-04-18
Payer: MEDICARE

## 2022-04-18 NOTE — TELEPHONE ENCOUNTER
[facility] Triage Nurse Telephone Encounter    Provider: TASHIA Moran CNP  Facility: [facility]  Facility Type:  AL    Caller: John   Call Back Number: [phone]    Allergies:    Allergies   Allergen Reactions     No Clinical Screening - See Comments Rash     Kerlix roll gauze      Tramadol Swelling     LE edema and erythema         Reason for call: this was done d/t dysuria, per nursing no further complaints, VS stable at this time.       Verbal Order/Direction given by Provider: Monitor for any s/s of UTI and call, NNO with the UC at this time    Provider giving Order:  Margie Campos MD    Verbal Order given to: John Cruz RN

## 2022-04-19 RX ORDER — CITALOPRAM HYDROBROMIDE 10 MG/1
10 TABLET ORAL DAILY
Start: 2022-04-19 | End: 2022-04-19

## 2022-04-20 ENCOUNTER — LAB REQUISITION (OUTPATIENT)
Dept: LAB | Facility: CLINIC | Age: 87
End: 2022-04-20
Payer: MEDICARE

## 2022-04-20 DIAGNOSIS — U07.1 COVID-19: ICD-10-CM

## 2022-04-20 DIAGNOSIS — N18.1 CHRONIC KIDNEY DISEASE, STAGE 1: ICD-10-CM

## 2022-04-20 PROCEDURE — U0003 INFECTIOUS AGENT DETECTION BY NUCLEIC ACID (DNA OR RNA); SEVERE ACUTE RESPIRATORY SYNDROME CORONAVIRUS 2 (SARS-COV-2) (CORONAVIRUS DISEASE [COVID-19]), AMPLIFIED PROBE TECHNIQUE, MAKING USE OF HIGH THROUGHPUT TECHNOLOGIES AS DESCRIBED BY CMS-2020-01-R: HCPCS | Mod: ORL | Performed by: INTERNAL MEDICINE

## 2022-04-21 LAB — SARS-COV-2 RNA RESP QL NAA+PROBE: NEGATIVE

## 2022-04-22 DIAGNOSIS — M54.50 CHRONIC BILATERAL LOW BACK PAIN WITHOUT SCIATICA: ICD-10-CM

## 2022-04-22 DIAGNOSIS — G89.29 CHRONIC BILATERAL LOW BACK PAIN WITHOUT SCIATICA: ICD-10-CM

## 2022-04-22 LAB
ANION GAP SERPL CALCULATED.3IONS-SCNC: 11 MMOL/L (ref 5–18)
BUN SERPL-MCNC: 27 MG/DL (ref 8–28)
CALCIUM SERPL-MCNC: 9.4 MG/DL (ref 8.5–10.5)
CHLORIDE BLD-SCNC: 98 MMOL/L (ref 98–107)
CO2 SERPL-SCNC: 28 MMOL/L (ref 22–31)
CREAT SERPL-MCNC: 0.79 MG/DL (ref 0.6–1.1)
GFR SERPL CREATININE-BSD FRML MDRD: 71 ML/MIN/1.73M2
GLUCOSE BLD-MCNC: 82 MG/DL (ref 70–125)
POTASSIUM BLD-SCNC: 4.5 MMOL/L (ref 3.5–5)
SODIUM SERPL-SCNC: 137 MMOL/L (ref 136–145)

## 2022-04-22 PROCEDURE — P9604 ONE-WAY ALLOW PRORATED TRIP: HCPCS | Mod: ORL | Performed by: NURSE PRACTITIONER

## 2022-04-22 PROCEDURE — 36415 COLL VENOUS BLD VENIPUNCTURE: CPT | Mod: ORL | Performed by: NURSE PRACTITIONER

## 2022-04-22 PROCEDURE — 80048 BASIC METABOLIC PNL TOTAL CA: CPT | Mod: ORL | Performed by: NURSE PRACTITIONER

## 2022-04-22 RX ORDER — LIDOCAINE PAIN RELIEF 40 MG/1000MG
PATCH TOPICAL
Qty: 30 PATCH | Refills: 11 | Status: SHIPPED | OUTPATIENT
Start: 2022-04-22 | End: 2023-01-01

## 2022-04-27 ENCOUNTER — LAB REQUISITION (OUTPATIENT)
Dept: LAB | Facility: CLINIC | Age: 87
End: 2022-04-27
Payer: MEDICARE

## 2022-04-27 DIAGNOSIS — U07.1 COVID-19: ICD-10-CM

## 2022-04-27 PROCEDURE — U0005 INFEC AGEN DETEC AMPLI PROBE: HCPCS | Mod: ORL | Performed by: INTERNAL MEDICINE

## 2022-04-28 LAB — SARS-COV-2 RNA RESP QL NAA+PROBE: NEGATIVE

## 2022-04-29 DIAGNOSIS — Z53.9 DIAGNOSIS NOT YET DEFINED: Primary | ICD-10-CM

## 2022-04-29 PROCEDURE — G0180 MD CERTIFICATION HHA PATIENT: HCPCS | Performed by: NURSE PRACTITIONER

## 2022-05-03 ENCOUNTER — LAB REQUISITION (OUTPATIENT)
Dept: LAB | Facility: CLINIC | Age: 87
End: 2022-05-03
Payer: MEDICARE

## 2022-05-03 DIAGNOSIS — U07.1 COVID-19: ICD-10-CM

## 2022-05-04 PROCEDURE — U0005 INFEC AGEN DETEC AMPLI PROBE: HCPCS | Mod: ORL | Performed by: INTERNAL MEDICINE

## 2022-05-05 LAB — SARS-COV-2 RNA RESP QL NAA+PROBE: NEGATIVE

## 2022-05-09 ENCOUNTER — LAB REQUISITION (OUTPATIENT)
Dept: LAB | Facility: CLINIC | Age: 87
End: 2022-05-09
Payer: MEDICARE

## 2022-05-09 DIAGNOSIS — U07.1 COVID-19: ICD-10-CM

## 2022-05-11 PROCEDURE — U0005 INFEC AGEN DETEC AMPLI PROBE: HCPCS | Mod: ORL | Performed by: INTERNAL MEDICINE

## 2022-05-12 LAB — SARS-COV-2 RNA RESP QL NAA+PROBE: NEGATIVE

## 2022-05-17 ENCOUNTER — LAB REQUISITION (OUTPATIENT)
Dept: LAB | Facility: CLINIC | Age: 87
End: 2022-05-17
Payer: MEDICARE

## 2022-05-17 DIAGNOSIS — U07.1 COVID-19: ICD-10-CM

## 2022-05-18 PROCEDURE — U0005 INFEC AGEN DETEC AMPLI PROBE: HCPCS | Mod: ORL | Performed by: INTERNAL MEDICINE

## 2022-05-19 LAB — SARS-COV-2 RNA RESP QL NAA+PROBE: NEGATIVE

## 2022-05-20 DIAGNOSIS — S32.010D COMPRESSION FRACTURE OF L1 VERTEBRA WITH ROUTINE HEALING, SUBSEQUENT ENCOUNTER: ICD-10-CM

## 2022-05-20 RX ORDER — HYDROMORPHONE HYDROCHLORIDE 2 MG/1
TABLET ORAL
Qty: 90 TABLET | Refills: 0 | Status: SHIPPED | OUTPATIENT
Start: 2022-05-20 | End: 2022-06-24

## 2022-06-24 ENCOUNTER — LAB REQUISITION (OUTPATIENT)
Dept: LAB | Facility: CLINIC | Age: 87
End: 2022-06-24
Payer: MEDICARE

## 2022-06-24 DIAGNOSIS — U07.1 COVID-19: ICD-10-CM

## 2022-06-24 DIAGNOSIS — S32.010D COMPRESSION FRACTURE OF L1 VERTEBRA WITH ROUTINE HEALING, SUBSEQUENT ENCOUNTER: ICD-10-CM

## 2022-06-24 PROCEDURE — U0005 INFEC AGEN DETEC AMPLI PROBE: HCPCS | Mod: ORL | Performed by: INTERNAL MEDICINE

## 2022-06-24 RX ORDER — HYDROMORPHONE HYDROCHLORIDE 2 MG/1
TABLET ORAL
Qty: 90 TABLET | Refills: 0 | Status: SHIPPED | OUTPATIENT
Start: 2022-06-24 | End: 2022-07-20

## 2022-06-25 LAB — SARS-COV-2 RNA RESP QL NAA+PROBE: NEGATIVE

## 2022-06-27 ENCOUNTER — LAB REQUISITION (OUTPATIENT)
Dept: LAB | Facility: CLINIC | Age: 87
End: 2022-06-27
Payer: MEDICARE

## 2022-06-27 DIAGNOSIS — U07.1 COVID-19: ICD-10-CM

## 2022-06-28 PROCEDURE — U0003 INFECTIOUS AGENT DETECTION BY NUCLEIC ACID (DNA OR RNA); SEVERE ACUTE RESPIRATORY SYNDROME CORONAVIRUS 2 (SARS-COV-2) (CORONAVIRUS DISEASE [COVID-19]), AMPLIFIED PROBE TECHNIQUE, MAKING USE OF HIGH THROUGHPUT TECHNOLOGIES AS DESCRIBED BY CMS-2020-01-R: HCPCS | Mod: ORL | Performed by: INTERNAL MEDICINE

## 2022-06-29 LAB — SARS-COV-2 RNA RESP QL NAA+PROBE: NEGATIVE

## 2022-06-30 DIAGNOSIS — A49.8 PSEUDOMONAS INFECTION: ICD-10-CM

## 2022-06-30 RX ORDER — MICONAZOLE NITRATE 20 MG/G
CREAM TOPICAL
Qty: 141 G | Refills: 11 | Status: SHIPPED | OUTPATIENT
Start: 2022-06-30

## 2022-07-06 ENCOUNTER — ASSISTED LIVING VISIT (OUTPATIENT)
Dept: GERIATRICS | Facility: CLINIC | Age: 87
End: 2022-07-06
Payer: MEDICARE

## 2022-07-06 VITALS — HEIGHT: 59 IN | WEIGHT: 90 LBS | BODY MASS INDEX: 18.14 KG/M2 | TEMPERATURE: 96.7 F

## 2022-07-06 DIAGNOSIS — F02.80 DEMENTIA IN ALZHEIMER'S DISEASE (H): ICD-10-CM

## 2022-07-06 DIAGNOSIS — F32.0 CURRENT MILD EPISODE OF MAJOR DEPRESSIVE DISORDER WITHOUT PRIOR EPISODE (H): Primary | ICD-10-CM

## 2022-07-06 DIAGNOSIS — G30.9 DEMENTIA IN ALZHEIMER'S DISEASE (H): ICD-10-CM

## 2022-07-06 DIAGNOSIS — E44.0 MODERATE PROTEIN-CALORIE MALNUTRITION (H): ICD-10-CM

## 2022-07-06 NOTE — PROGRESS NOTES
"[facility] GERIATRICS    Chief Complaint   Patient presents with     [facility] Care Coordination - Regulatory     HPI:  Litzy Kenny is a 89 year old  (4/10/1932), who is being seen today for an episodic care visit at: [facility]) [[id number]]. Today's concern is:     1. Depression: patient's mood is flat and without expression. Feels she is at her baseline. Currently taking celexa 10 mg/day  2. Dementia: Baseline cognition. Asked the same questions frequently throughout visit and with short term memory loss.  Shan helpful filling in HPI. Scored 3 on Global Disorientation Scale.   3. Moderate protein-calorie malnutrition: Has lots of dietary restrictions and upset with kitchen a lot. Complains about menu at every visit. This morning ate 25% of breakfast. Body mass index is 18.18 kg/m .         Allergies, and PMH/PSH reviewed in frintit today.  REVIEW OF SYSTEMS:  Unobtainable secondary to cognitive impairment.  and 4 point ROS including Respiratory, CV, GI and , other than that noted in the HPI,  is negative    Objective:   Temp (!) 96.7  F (35.9  C)   Ht 1.499 m (4' 11\")   Wt 40.8 kg (90 lb)   BMI 18.18 kg/m    GENERAL APPEARANCE:  Alert, in no distress  ENT:  Mouth and posterior oropharynx normal, moist mucous membranes, Telida  RESP:  respiratory effort and palpation of chest normal, lungs clear to auscultation   CV:  Palpation and auscultation of heart done , regular rate and rhythm, no murmur, rub, or gallop  M/S:   Gait and station normal  Digits and nails normal  SKIN:  Inspection of skin and subcutaneous tissue baseline, Palpation of skin and subcutaneous tissue baseline  NEURO:   Cranial nerves 2-12 are normal tested and grossly at patient's baseline  PSYCH:  memory impaired , affect and mood normal    Labs done in SNF are in [facility]. Please refer to them using frintit/Care Everywhere. and Recent labs in [facility] reviewed by me today.     Assessment/Plan:  (F32.0) Current mild episode " of major depressive disorder without prior episode (H)  (primary encounter diagnosis)  Comment: Longstanding, stable on current dose of celexa 10 mg/day.   Plan:   -no change at this time and monitor.     (G30.9,  F02.80) Dementia in Alzheimer's disease (H)  Comment: Chronic, progressive. Able to stay with spouse's apartment with his support and help.   Plan:   Continue AL support with medication administration, meals and safety.   -Expect further decline with progression of disease.       (E44.0) Moderate protein-calorie malnutrition (H)  Comment:   Body mass index is 18.18 kg/m .  Plan:   -Encourage snacks and balanced meals.   -monthly weights      Orders:  Monthly weights     Electronically signed by:   Galilea Meredith NP

## 2022-07-06 NOTE — LETTER
"    7/6/2022        RE: Litzy Kenny  C/o Ben Kenny  [address] [facility] [address]        [facility] GERIATRICS    Chief Complaint   Patient presents with     [facility] Care Coordination - Regulatory     HPI:  Litzy Kenny is a 89 year old  (4/10/1932), who is being seen today for an episodic care visit at: [facility]) [[id number]]. Today's concern is:     1. Depression: patient's mood is flat and without expression. Feels she is at her baseline. Currently taking celexa 10 mg/day  2. Dementia: Baseline cognition. Asked the same questions frequently throughout visit and with short term memory loss.  Shan helpful filling in HPI. Scored 3 on Global Disorientation Scale.   3. Moderate protein-calorie malnutrition: Has lots of dietary restrictions and upset with kitchen a lot. Complains about menu at every visit. This morning ate 25% of breakfast. Body mass index is 18.18 kg/m .         Allergies, and PMH/PSH reviewed in [facility] today.  REVIEW OF SYSTEMS:  Unobtainable secondary to cognitive impairment.  and 4 point ROS including Respiratory, CV, GI and , other than that noted in the HPI,  is negative    Objective:   Temp (!) 96.7  F (35.9  C)   Ht 1.499 m (4' 11\")   Wt 40.8 kg (90 lb)   BMI 18.18 kg/m    GENERAL APPEARANCE:  Alert, in no distress  ENT:  Mouth and posterior oropharynx normal, moist mucous membranes, [place]  RESP:  respiratory effort and palpation of chest normal, lungs clear to auscultation   CV:  Palpation and auscultation of heart done , regular rate and rhythm, no murmur, rub, or gallop  M/S:   Gait and station normal  Digits and nails normal  SKIN:  Inspection of skin and subcutaneous tissue baseline, Palpation of skin and subcutaneous tissue baseline  NEURO:   Cranial nerves 2-12 are normal tested and grossly at patient's baseline  PSYCH:  memory impaired , affect and mood normal    Labs done in SNF are in [facility]. Please refer to them using EPIC/Care " Everywhere. and Recent labs in EPIC reviewed by me today.     Assessment/Plan:  (F32.0) Current mild episode of major depressive disorder without prior episode (H)  (primary encounter diagnosis)  Comment: Longstanding, stable on current dose of celexa 10 mg/day.   Plan:   -no change at this time and monitor.     (G30.9,  F02.80) Dementia in Alzheimer's disease (H)  Comment: Chronic, progressive. Able to stay with spouse's apartment with his support and help.   Plan:   Continue AL support with medication administration, meals and safety.   -Expect further decline with progression of disease.       (E44.0) Moderate protein-calorie malnutrition (H)  Comment:   Body mass index is 18.18 kg/m .  Plan:   -Encourage snacks and balanced meals.   -monthly weights      Orders:  Monthly weights     Electronically signed by:   Galilea Meredith NP          Sincerely,        Galilea Meredith, NP

## 2022-08-14 DIAGNOSIS — S32.010D COMPRESSION FRACTURE OF L1 VERTEBRA WITH ROUTINE HEALING, SUBSEQUENT ENCOUNTER: ICD-10-CM

## 2022-08-31 NOTE — LETTER
8/31/2022        RE: Litzy Kenny  C/o Ben Kenny  [address]  [facility] [address]        Litzy Kenny is a 89 year old female seen August 31, 2022 at [facility] where she has resided for 2 and a half years (admit 1/2020), seen to follow up chronic pain and stasis edema, recently more weak and tired.    It is difficult to get patient's attention, states she is looking for her brown garbage bag.   She is getting more frail.   Pt is seen in her apartment with her  Shan present.   She has eaten just a few bites of her breakfast, states she doesn't want anymore, toast is burnt.  Talks at length about how she doesn't like the meals that are brought up to her.    She has not been out of the apartment since they moved in.   She is on the sofa 24/7, per .  Transfers with Nursing assist to  into bathroom, otherwise she is on the sofa with clutter all around her.  Doesn't want anything moved    By chart review, she has been seen Urology for urinary incontinence, most recently in April 2019, started on Myrbetriq    Pt was not interested in other options offered, e.g., Botox   Pt has a past h/o PAF, followed by Cardiology in the past with CHADS-VASc score 4.   She has declined anticoagulation.     Pt was hospitalized in October 2019 for venous stasis ulcer, dementia and impaired mobility. She went to TCU then returned home with her 's care.   She was readmitted to [facility] 12/2019 for altered mental status and new compression fractures at L3 and L1.   She also has severe spinal stenosis lumbar spine, and chronic compression fracture L4.  She has been on narcotics for many years.       PMH:  Cystoid macular edema, right eye  Cataract  Atrial fibrillation /PAF  Dementia, late onset Alzheimer's type  Osteoporosis   Multiple compression fractures  Chronic bilateral LBP without sciatica  Chronic pain /controlled substance agreement signed  HTN  Anemia  H/o SCC and  "BCC  Venous stasis ulcers LEs, 2019  Lipodermatosclerosis    SH:  Lives with her  in [place].   They previously lived in a house in [place]; had lived there since 1960.   They have a daughter Nathalie (in [place]), son Reynold (in [place]) and son Ben (in [place]).   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   Non smoker    ROS: non-ambulatory    SLUMS 9/30  CPT 4.1 at admission  Poor appetite, weight 90 lbs  Incontinence  Tinetti 20/28      EXAM:  Very frail, NAD  BP (!) 87/49   Pulse 79   Temp 98.2  F (36.8  C)   Resp 20   Ht 1.499 m (4' 11\")   Wt 40.8 kg (90 lb)   SpO2 95%   BMI 18.18 kg/m     Severe kyphosis, folded almost in half when standing  Neck supple without adenopathy  Lungs with decreased BS, no rales or wheeze  Heart RRR s1s2 at 80  Abd soft, NT, no distention or guarding, +BS  Ext without edema, +sarcopenia  Deforming changes of OA bilaterally hands    Neuro: STML, limited history, no focal deficits  Psych: perseverative, obsessive-compulsive, unable to give any reliable history     Last Comprehensive Metabolic Panel:  Sodium   Date Value Ref Range Status   04/22/2022 137 136 - 145 mmol/L Final   08/13/2020 136 136 - 145 mmol/L Final     Potassium   Date Value Ref Range Status   04/22/2022 4.5 3.5 - 5.0 mmol/L Final   08/13/2020 4.2 3.5 - 5.0 mmol/L Final     Chloride   Date Value Ref Range Status   04/22/2022 98 98 - 107 mmol/L Final   08/13/2020 101 98 - 107 mmol/L Final     Carbon Dioxide   Date Value Ref Range Status   08/13/2020 24 22 - 31 mmol/L Final     Carbon Dioxide (CO2)   Date Value Ref Range Status   04/22/2022 28 22 - 31 mmol/L Final     Anion Gap   Date Value Ref Range Status   04/22/2022 11 5 - 18 mmol/L Final   08/13/2020 11 5 - 18 mmol/L Final     Glucose   Date Value Ref Range Status   04/22/2022 82 70 - 125 mg/dL Final   08/13/2020 80 70 - 125 mg/dL Final     Urea Nitrogen   Date Value Ref Range Status   04/22/2022 27 8 - 28 mg/dL Final "   08/13/2020 27 8 - 28 mg/dL Final     Creatinine   Date Value Ref Range Status   04/22/2022 0.79 0.60 - 1.10 mg/dL Final   08/13/2020 0.77 0.60 - 1.10 mg/dL Final     GFR Estimate   Date Value Ref Range Status   04/22/2022 71 >60 mL/min/1.73m2 Final     Comment:     Effective December 21, 2021 eGFRcr in adults is calculated using the 2021 CKD-EPI creatinine equation which includes age and gender (Vinod et al., NEJ, DOI: 10.1056/HKERkw8704177)   08/13/2020 >60 >60 mL/min/1.73m2 Final   08/13/2020 >60 >60 ml/min/1.73m2 Final     Calcium   Date Value Ref Range Status   04/22/2022 9.4 8.5 - 10.5 mg/dL Final   08/13/2020 9.4 8.5 - 10.5 mg/dL Final       IMP/PLAN:   (I10) Benign essential hypertension   Comment: good control   BP Readings from Last 3 Encounters:   08/31/22 (!) 87/49   04/07/22 110/65   03/31/22 134/77      Plan: decrease carvedilol to 6.25 mg bid       (S32.010D) Compression fracture of L1 vertebra with routine healing, subsequent encounter   (M81.0) Osteoporosis without current pathological fracture, unspecified osteoporosis type  Comment: acute fractures of L1 and L3, chronic L4 compression fracture.   Severe kyphosis     Plan: vit D and calcium replacement.  Assist with all mobility        (M48.061) Spinal stenosis of lumbar region without neurogenic claudication  (M54.5,  G89.29) Chronic bilateral low back pain without sciatica  Comment: many years of chronic pain and opioid dependence      Plan: regimen has been decreased, now on Dilaudid 2 mg tid>>>would consider further decrease to 1 mg tid   Also on scheduled acetaminophen and Lidocaine patches to back     (E44.0) Moderate protein-calorie malnutrition (H)    Comment: she is a picky eater, continues to have a poor appetite  Plan: nutritional supplements may be an option    (N32.81) Overactive bladder  Comment: longstanding  Plan: Myrbetriq 50 mg/day.   Has seen Urology and declined other tx  Continue with incontinence products and assist with  cares  - unchanged     (I48.0) Paroxysmal atrial fibrillation (H)  Comment:   Pulse Readings from Last 4 Encounters:   08/31/22 79   04/07/22 100   03/31/22 77   03/15/22 65     Plan: decrease carvedilol as above.  She has declined anticoagulation.       (G30.9,  F02.80) Dementia in Alzheimer's disease (H)  Comment: low functional status; pt is guarded and resistant to change, has hoarding and obsessive compulsive tendencies but has declined medications for anxiety.   Plan: AL support for meds, meals, activity.   She is close to needing a higher level of care        Gloria Quezada MD         Sincerely,        Gloria Quezada MD

## 2022-08-31 NOTE — PROGRESS NOTES
Litzy Kenny is a 89 year old female seen August 31, 2022 at [facility] where she has resided for 2 and a half years (admit 1/2020), seen to follow up chronic pain and stasis edema, recently more weak and tired.    It is difficult to get patient's attention, states she is looking for her brown garbage bag.   She is getting more frail.   Pt is seen in her apartment with her  Shan present.   She has eaten just a few bites of her breakfast, states she doesn't want anymore, toast is burnt.  Talks at length about how she doesn't like the meals that are brought up to her.    She has not been out of the apartment since they moved in.   She is on the sofa 24/7, per .  Transfers with Nursing assist to  into bathroom, otherwise she is on the sofa with clutter all around her.  Doesn't want anything moved    By chart review, she has been seen Urology for urinary incontinence, most recently in April 2019, started on Myrbetriq    Pt was not interested in other options offered, e.g., Botox   Pt has a past h/o PAF, followed by Cardiology in the past with CHADS-VASc score 4.   She has declined anticoagulation.     Pt was hospitalized in October 2019 for venous stasis ulcer, dementia and impaired mobility. She went to TCU then returned home with her 's care.   She was readmitted to [facility] 12/2019 for altered mental status and new compression fractures at L3 and L1.   She also has severe spinal stenosis lumbar spine, and chronic compression fracture L4.  She has been on narcotics for many years.       PMH:  Cystoid macular edema, right eye  Cataract  Atrial fibrillation /PAF  Dementia, late onset Alzheimer's type  Osteoporosis   Multiple compression fractures  Chronic bilateral LBP without sciatica  Chronic pain /controlled substance agreement signed  HTN  Anemia  H/o SCC and BCC  Venous stasis ulcers LEs, 2019  Lipodermatosclerosis    SH:  [place]ives [place] her  in [place].   They  "previously lived in a house in [place]; had lived there since 1960.   They have a daughter Nathalie (in [place]), son Reynold (in [place]) and son Ben (in [place]).   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   Non smoker    ROS: non-ambulatory    SLUMS 9/30  CPT 4.1 at admission  Poor appetite, weight 90 lbs  Incontinence  Tinetti 20/28      EXAM:  Very frail, NAD  BP (!) 87/49   Pulse 79   Temp 98.2  F (36.8  C)   Resp 20   Ht 1.499 m (4' 11\")   Wt 40.8 kg (90 lb)   SpO2 95%   BMI 18.18 kg/m     Severe kyphosis, folded almost in half when standing  Neck supple without adenopathy  Lungs with decreased BS, no rales or wheeze  Heart RRR s1s2 at 80  Abd soft, NT, no distention or guarding, +BS  Ext without edema, +sarcopenia  Deforming changes of OA bilaterally hands    Neuro: STML, limited history, no focal deficits  Psych: perseverative, obsessive-compulsive, unable to give any reliable history     Last Comprehensive Metabolic Panel:  Sodium   Date Value Ref Range Status   04/22/2022 137 136 - 145 mmol/L Final   08/13/2020 136 136 - 145 mmol/L Final     Potassium   Date Value Ref Range Status   04/22/2022 4.5 3.5 - 5.0 mmol/L Final   08/13/2020 4.2 3.5 - 5.0 mmol/L Final     Chloride   Date Value Ref Range Status   04/22/2022 98 98 - 107 mmol/L Final   08/13/2020 101 98 - 107 mmol/L Final     Carbon Dioxide   Date Value Ref Range Status   08/13/2020 24 22 - 31 mmol/L Final     Carbon Dioxide (CO2)   Date Value Ref Range Status   04/22/2022 28 22 - 31 mmol/L Final     Anion Gap   Date Value Ref Range Status   04/22/2022 11 5 - 18 mmol/L Final   08/13/2020 11 5 - 18 mmol/L Final     Glucose   Date Value Ref Range Status   04/22/2022 82 70 - 125 mg/dL Final   08/13/2020 80 70 - 125 mg/dL Final     Urea Nitrogen   Date Value Ref Range Status   04/22/2022 27 8 - 28 mg/dL Final   08/13/2020 27 8 - 28 mg/dL Final     Creatinine   Date Value Ref Range Status   04/22/2022 0.79 0.60 - 1.10 " mg/dL Final   08/13/2020 0.77 0.60 - 1.10 mg/dL Final     GFR Estimate   Date Value Ref Range Status   04/22/2022 71 >60 mL/min/1.73m2 Final     Comment:     Effective December 21, 2021 eGFRcr in adults is calculated using the 2021 CKD-EPI creatinine equation which includes age and gender (Vinod et al., NE, DOI: 10.1056/QBYXka8889261)   08/13/2020 >60 >60 mL/min/1.73m2 Final   08/13/2020 >60 >60 ml/min/1.73m2 Final     Calcium   Date Value Ref Range Status   04/22/2022 9.4 8.5 - 10.5 mg/dL Final   08/13/2020 9.4 8.5 - 10.5 mg/dL Final       IMP/PLAN:   (I10) Benign essential hypertension   Comment: good control   BP Readings from Last 3 Encounters:   08/31/22 (!) 87/49   04/07/22 110/65   03/31/22 134/77      Plan: decrease carvedilol to 6.25 mg bid       (S32.010D) Compression fracture of L1 vertebra with routine healing, subsequent encounter   (M81.0) Osteoporosis without current pathological fracture, unspecified osteoporosis type  Comment: acute fractures of L1 and L3, chronic L4 compression fracture.   Severe kyphosis     Plan: vit D and calcium replacement.  Assist with all mobility        (M48.061) Spinal stenosis of lumbar region without neurogenic claudication  (M54.5,  G89.29) Chronic bilateral low back pain without sciatica  Comment: many years of chronic pain and opioid dependence      Plan: regimen has been decreased, now on Dilaudid 2 mg tid>>>would consider further decrease to 1 mg tid   Also on scheduled acetaminophen and Lidocaine patches to back     (E44.0) Moderate protein-calorie malnutrition (H)    Comment: she is a picky eater, continues to have a poor appetite  Plan: nutritional supplements may be an option    (N32.81) Overactive bladder  Comment: longstanding  Plan: Myrbetriq 50 mg/day.   Has seen Urology and declined other tx  Continue with incontinence products and assist with cares  - unchanged     (I48.0) Paroxysmal atrial fibrillation (H)  Comment:   Pulse Readings from Last 4  Encounters:   08/31/22 79   04/07/22 100   03/31/22 77   03/15/22 65     Plan: decrease carvedilol as above.  She has declined anticoagulation.       (G30.9,  F02.80) Dementia in Alzheimer's disease (H)  Comment: low functional status; pt is guarded and resistant to change, has hoarding and obsessive compulsive tendencies but has declined medications for anxiety.   Plan: AL support for meds, meals, activity.   She is close to needing a higher level of care        Gloria Quezada MD

## 2022-09-09 NOTE — LETTER
"    9/9/2022        RE: Litzy Kenny  C/o Ben Kenny  [address] [address]        [facility]    Chief Complaint   Patient presents with     RECHECK     F/U yeast infection/eye      HPI:  Litzy Kenny is a 89 year old  (4/10/1932), who is being seen today for an episodic care visit at: [facility] [[id number]]. Today's concern is: patient with new left eye redness in outer sclera, no drainage noted. She denies pain and vision is unchanged. Spouse reports it has been there a long time and no one has noticed. Denies straining with BMs and blood pressure within goal range. No fall or injury to eye reported. Also complaining of pain and itching under right breast. Raw and without drainage.     BP Readings from Last 3 Encounters:   09/08/22 113/56   08/31/22 (!) 87/49   04/07/22 110/65     Pulse Readings from Last 4 Encounters:   09/08/22 92   08/31/22 79   04/07/22 100   03/31/22 77     Wt Readings from Last 4 Encounters:   01/21/22 40.8 kg (90 lb)   08/31/22 40.8 kg (90 lb)   07/06/22 40.8 kg (90 lb)   01/21/22 40.8 kg (90 lb)         Allergies, and PMH/PSH reviewed in EPIC today.  REVIEW OF SYSTEMS:  Unobtainable secondary to cognitive impairment.     Objective:   /56   Pulse 92   Temp 97.4  F (36.3  C)   Resp 16   Ht 1.499 m (4' 11\")   Wt 40.8 kg (90 lb)   SpO2 97%   BMI 18.18 kg/m    GENERAL APPEARANCE:  Alert, in no distress  ENT:  Mouth and posterior oropharynx normal, moist mucous membranes, [place]  RESP:  respiratory effort and palpation of chest normal, lungs clear to auscultation   CV:  Palpation and auscultation of heart done , regular rate and rhythm, no murmur, rub, or gallop  M/S:   Gait and station normal  Digits and nails normal  SKIN:  redness under right breast  NEURO:   Cranial nerves 2-12 are normal tested and grossly at patient's baseline  PSYCH:  insight and judgement impaired, memory impaired , affect and mood normal    Labs done " in SNF are in [facility]. Please refer to them using EPIC/Care Everywhere. and Recent labs in [facility]IC reviewed by me today.     Assessment/Plan:  (R21) Rash  (primary encounter diagnosis)  Comment: Acute rash under right breast, appears yeasty  Plan:   -Nystatin powder  BID until resolved.     (H11.32) Scleral hemorrhage, left  Comment: Acute, spontaneous.   Plan:   -Monitor, no new orders     (G30.9,  F02.80) Dementia in Alzheimer's disease (H)  Comment: Chronic, progressive. With progressive and functional decline.   Plan:   -Continue AL assist   -Expect further decline with progression of disease   -Patient and family declined Journey program           Post Medication Reconciliation Status: Patient was not discharged from an inpatient facility or TCU        Orders:  -Nystatin powder under right breast BID until resolved.     Electronically signed by:   Galilea Meredith NP            Sincerely,        Galilea Meredith NP

## 2022-09-09 NOTE — PROGRESS NOTES
"[facility]    Chief Complaint   Patient presents with     RECHECK     F/U yeast infection/eye      HPI:  Litzy Kenny is a 89 year old  (4/10/1932), who is being seen today for an episodic care visit at: [facility]) [[id number]]. Today's concern is: patient with new left eye redness in outer sclera, no drainage noted. She denies pain and vision is unchanged. Spouse reports it has been there a long time and no one has noticed. Denies straining with BMs and blood pressure within goal range. No fall or injury to eye reported. Also complaining of pain and itching under right breast. Raw and without drainage.     BP Readings from Last 3 Encounters:   09/08/22 113/56   08/31/22 (!) 87/49   04/07/22 110/65     Pulse Readings from Last 4 Encounters:   09/08/22 92   08/31/22 79   04/07/22 100   03/31/22 77     Wt Readings from Last 4 Encounters:   01/21/22 40.8 kg (90 lb)   08/31/22 40.8 kg (90 lb)   07/06/22 40.8 kg (90 lb)   01/21/22 40.8 kg (90 lb)         Allergies, and PMH/PSH reviewed in [facility] today.  REVIEW OF SYSTEMS:  Unobtainable secondary to cognitive impairment.     Objective:   /56   Pulse 92   Temp 97.4  F (36.3  C)   Resp 16   Ht 1.499 m (4' 11\")   Wt 40.8 kg (90 lb)   SpO2 97%   BMI 18.18 kg/m    GENERAL APPEARANCE:  Alert, in no distress  ENT:  Mouth and posterior oropharynx normal, moist mucous membranes, [place]  RESP:  respiratory effort and palpation of chest normal, lungs clear to auscultation   CV:  Palpation and auscultation of heart done , regular rate and rhythm, no murmur, rub, or gallop  M/S:   Gait and station normal  Digits and nails normal  SKIN:  redness under right breast  NEURO:   Cranial nerves 2-12 are normal tested and grossly at patient's baseline  PSYCH:  insight and judgement impaired, memory impaired , affect and mood normal    Labs done in SNF are in [facility]. Please refer to them using [facility]/Care Everywhere. and Recent labs in [facility] " reviewed by me today.     Assessment/Plan:  (R21) Rash  (primary encounter diagnosis)  Comment: Acute rash under right breast, appears yeasty  Plan:   -Nystatin powder  BID until resolved.     (H11.32) Scleral hemorrhage, left  Comment: Acute, spontaneous.   Plan:   -Monitor, no new orders     (G30.9,  F02.80) Dementia in Alzheimer's disease (H)  Comment: Chronic, progressive. With progressive and functional decline.   Plan:   -Continue [place] assist   -Expect further decline with progression of disease   -Patient and family declined [facility] program           Post Medication Reconciliation Status: Patient was not discharged from an inpatient facility or TCU        Orders:  -Nystatin powder under right breast BID until resolved.     Electronically signed by:   Galilea Meredith NP

## 2022-10-05 PROBLEM — L25.9 CONTACT DERMATITIS: Status: ACTIVE | Noted: 2019-11-19

## 2022-10-05 NOTE — LETTER
"    10/5/2022        RE: Litzy Kenny  C/o Ben Kenny  [address] [address]        [facility]    Chief Complaint   Patient presents with     RECHECK     Husbands request     HPI:  Litzy Kenny is a 89 year old  (4/10/1932), who is being seen today for an episodic care visit at: [facility] [[id number]]. Today's concern is:   -Patient alert to self only. HPI difficult to obtain.   -Seen in room, patient and spouse do not leave and refuse outside referrals.   -Left eye sclera hemorrhage x3 weeks. Per  unchanged. Denies constipation, ophthalmology consults, HTN ok.   -No visual changes.      Allergies, and PMH/PSH reviewed in [facility] today.  REVIEW OF SYSTEMS:  Unobtainable secondary to cognitive impairment.     Objective:   /69   Pulse 81   Temp 97.9  F (36.6  C)   Resp 20   Ht 1.499 m (4' 11\")   Wt 40.8 kg (90 lb)   SpO2 96%   BMI 18.18 kg/m    A & O x 3, NAD. Lungs CTA, non labored. RRR, S1/S2 w/o murmur,gallop or rub.  edema.  Abdomen soft, nontender, +BT'S. No focal neurological deficits.    -Left eye scleral hemorrhage.        Labs done in SNF are in [facility]. Please refer to them using [facility]/Care Everywhere. and Recent labs in [facility] reviewed by me today.     Assessment/Plan:  (H11.32) Scleral hemorrhage of left eye  (primary encounter diagnosis)  Comment: new onset of left eye scleral hemorrhage, spontaneous. Refuses to see ophthalmology. Eye exam otherwise neg and denies injury.   Plan:   -CBC with diff next lab day           Post Medication Reconciliation Status: Patient was not discharged from an inpatient facility or TCU        Orders:  1. CBC    Electronically signed by:   Galilea Meredith NP          Sincerely,        Galilea Meredith NP    "

## 2022-10-05 NOTE — PROGRESS NOTES
"[facility]    Chief Complaint   Patient presents with     RECHECK     Husbands request     HPI:  Litzy Kenny is a 89 year old  (4/10/1932), who is being seen today for an episodic care visit at: [facility]) [[id number]]. Today's concern is:   -Patient alert to self only. HPI difficult to obtain.   -Seen in room, patient and spouse do not leave and refuse outside referrals.   -Left eye sclera hemorrhage x3 weeks. Per  unchanged. Denies constipation, ophthalmology consults, HTN ok.   -No visual changes.      Allergies, and PMH/PSH reviewed in [facility] [facility] today.  REVIEW OF SYSTEMS:  Unobtainable secondary to cognitive impairment.     Objective:   /69   Pulse 81   Temp 97.9  F (36.6  C)   Resp 20   Ht 1.499 m (4' 11\")   Wt 40.8 kg (90 lb)   SpO2 96%   BMI 18.18 kg/m    A & O x 3, NAD. Lungs CTA, non labored. RRR, S1/S2 w/o murmur,gallop or rub.  edema.  Abdomen soft, nontender, +BT'S. No focal neurological deficits.    -Left eye scleral hemorrhage.        Labs done in SNF are in [facility]. Please refer to them using [employer]/Care Everywhere. and Recent labs in [facility] reviewed by me today.     Assessment/Plan:  (H11.32) Scleral hemorrhage of left eye  (primary encounter diagnosis)  Comment: new onset of left eye scleral hemorrhage, spontaneous. Refuses to see ophthalmology. Eye exam otherwise neg and denies injury.   Plan:   -CBC with diff next lab day           Post Medication Reconciliation Status: Patient was not discharged from an inpatient facility or TCU        Orders:  1. CBC    Electronically signed by:   Galilea Meredith NP    "

## 2022-10-12 NOTE — PROGRESS NOTES
"[facility]AT[facility]    Chief Complaint   Patient presents with     RECHECK     Labs/leukocytosis     HPI:  Litzy Kenny is a 89 year old  (4/10/1932), who is being seen today for an episodic care visit at: [facility]) [[id number]]. Today's concern is: Patient resting in room upon visit.  Was previously seen for a subconjunctival hemorrhage.  This is much improved today, patient continues to deny changes in vision or pain.  A complete CBC was taken secondary to length of healing time.  Incidental finding of leukocytosis with elevated neutrophils.  Patient denies feeling ill, cough, fever or chills, pain with urination, or frequency.  No skin breakdown or rash noted.  Additionally hemoglobin dropped from 10.3-9.8.  Patient does report she is feeling more fatigued lately.  Writer had long discussion with patient and spouse Shan who reports they will not leave the room for any additional appointments, procedures, or hospitalizations.    BP Readings from Last 3 Encounters:   10/12/22 118/51   10/05/22 137/69   09/08/22 113/56     Pulse Readings from Last 4 Encounters:   10/12/22 80   10/05/22 81   09/08/22 92   08/31/22 79     Wt Readings from Last 4 Encounters:   10/12/22 40.8 kg (90 lb)   10/05/22 40.8 kg (90 lb)   01/21/22 40.8 kg (90 lb)   08/31/22 40.8 kg (90 lb)         Allergies, and PMH/PSH reviewed in [facility]IC today.  REVIEW OF SYSTEMS:  Unobtainable secondary to cognitive impairment.  and Limited secondary to cognitive impairment but today pt reports feeling fine.     Objective:   /51   Pulse 80   Temp 97.8  F (36.6  C)   Resp 18   Ht 1.499 m (4' 11\")   Wt 40.8 kg (90 lb)   SpO2 93%   BMI 18.18 kg/m    A & O x 3, NAD. Lungs CTA, non labored. RRR, S1/S2 w/o murmur,gallop or rub.  edema.  Abdomen soft, nontender, +BT'S. No focal neurological deficits.  Alert and oriented x3 with forgetfulness.       Labs done in SNF are in [facility]. Please refer to them using [facility]/Care " Everywhere. and Recent labs in [facility] reviewed by me today.     Assessment/Plan:  (H11.32) Scleral hemorrhage, left  (primary encounter diagnosis)  Comment: Spontaneous, improving.  Plan: No changes at this time, monitor.    (D64.9) Anemia, unspecified type  Comment: Chronic, worsening anemia noted from 10.3-9.8.  Patient with increased fatigue  Plan:   -Ferrous sulfate 325 mg p.o. every other day  -Prilosec 40 mg p.o. daily x8 weeks  -CBC with differential 8 weeks on lab day  -Vitamin D and B12 levels next lab day    (D72.828) Other elevated white blood cell (WBC) count  Comment: Incidental finding with CBC with differential.  Patient without signs symptoms infection at this time  Plan:   -UA UC  -Cipro 250 mg p.o. twice daily x5 days      Post Medication Reconciliation Status: Patient was not discharged from an inpatient facility or TCU        Orders:  1.  CBC with differential in 8 weeks  2 Prilosec 40 mg p.o. daily x8 weeks  3.  Ferrous sulfate 325 mg p.o. every other day  4.  Vitamin D and B12 levels next lab day  5.  UA/UC clean-catch  6.  Cipro 250 mg p.o. twice daily x5 days    Electronically signed by:   Galilea Meredith NP

## 2022-10-12 NOTE — LETTER
"    10/12/2022        RE: Litzy Kenny  C/o Ben Kenny  [address] [address]        [facility]    Chief Complaint   Patient presents with     RECHECK     Labs/leukocytosis     HPI:  Litzy Kenny is a 89 year old  (4/10/1932), who is being seen today for an episodic care visit at: [facility]) [[id number]]. Today's concern is: Patient resting in room upon visit.  Was previously seen for a subconjunctival hemorrhage.  This is much improved today, patient continues to deny changes in vision or pain.  A complete CBC was taken secondary to length of healing time.  Incidental finding of leukocytosis with elevated neutrophils.  Patient denies feeling ill, cough, fever or chills, pain with urination, or frequency.  No skin breakdown or rash noted.  Additionally hemoglobin dropped from 10.3-9.8.  Patient does report she is feeling more fatigued lately.  Writer had long discussion with patient and spouse Shan who reports they will not leave the room for any additional appointments, procedures, or hospitalizations.    BP Readings from Last 3 Encounters:   10/12/22 118/51   10/05/22 137/69   09/08/22 113/56     Pulse Readings from Last 4 Encounters:   10/12/22 80   10/05/22 81   09/08/22 92   08/31/22 79     Wt Readings from Last 4 Encounters:   10/12/22 40.8 kg (90 lb)   10/05/22 40.8 kg (90 lb)   01/21/22 40.8 kg (90 lb)   08/31/22 40.8 kg (90 lb)         Allergies, and PMH/PSH reviewed in [facility] today.  REVIEW OF SYSTEMS:  Unobtainable secondary to cognitive impairment.  and Limited secondary to cognitive impairment but today pt reports feeling fine.     Objective:   /51   Pulse 80   Temp 97.8  F (36.6  C)   Resp 18   Ht 1.499 m (4' 11\")   Wt 40.8 kg (90 lb)   SpO2 93%   BMI 18.18 kg/m    A & O x 3, NAD. Lungs CTA, non labored. RRR, S1/S2 w/o murmur,gallop or rub.  edema.  Abdomen soft, nontender, +BT'S. No focal neurological deficits.  Alert and oriented x3 " with forgetfulness.       [facility]. Please refer to them using EPIC/Care Everywhere. and Recent labs in [facility] reviewed by me today.     Assessment/Plan:  (H11.32) Scleral hemorrhage, left  (primary encounter diagnosis)  Comment: Spontaneous, improving.  Plan: No changes at this time, monitor.    (D64.9) Anemia, unspecified type  Comment: Chronic, worsening anemia noted from 10.3-9.8.  Patient with increased fatigue  Plan:   -Ferrous sulfate 325 mg p.o. every other day  -Prilosec 40 mg p.o. daily x8 weeks  -CBC with differential 8 weeks on lab day  -Vitamin D and B12 levels next lab day    (D72.828) Other elevated white blood cell (WBC) count  Comment: Incidental finding with CBC with differential.  Patient without signs symptoms infection at this time  Plan:   -UA UC  -Cipro 250 mg p.o. twice daily x5 days      Post Medication Reconciliation Status: Patient was not discharged from an inpatient facility or TCU        Orders:  1.  CBC with differential in 8 weeks  2 Prilosec 40 mg p.o. daily x8 weeks  3.  Ferrous sulfate 325 mg p.o. every other day  4.  Vitamin D and B12 levels next lab day  5.  UA/UC clean-catch  6.  Cipro 250 mg p.o. twice daily x5 days    Electronically signed by:   Galilea Meredith NP            Sincerely,        Galilea Meredith NP

## 2022-10-19 NOTE — LETTER
"    10/19/2022        RE: Litzy Kenny  C/o Ben Kenny  [address] [facility] [address]        [facility] GERIATRICS    Chief Complaint   Patient presents with     RECHECK     HPI:  Litzy Kenny is a 89 year old  (4/10/1932), who is being seen today for an episodic care visit at: [facility] ([facility]) [[id number]]. Today's concern is abnormal labs.      Her past medical history includes    Paroxysmal atrial fibrillation - declined oral anticoagulation    Altered mental status    Spinal stenosis of lumbar region    Pathologic fracture of the vertebrae    Pulmonary nodule    Protein calorie malnutrition    Chronic pain disorder currently taking dilaudid, lidocaine patch    Closed compression fracture of lumbosacral spine (H)    Controlled substance agreement signed    Dementia in Alzheimer's disease (H) per documentation on 9/9/2020, Patient and family declined Journey program.  She lays on the couch most days and is a 2 person assist.    Drug-induced constipation    HTN (hypertension) - with coreg 6.25 mg BID which was decreased in 8/2022.     Hyponatremia    Normocytic anemia, not due to blood loss    Osteoarthrosis    Allergies, and PMH/PSH reviewed in [facility] today.  REVIEW OF SYSTEMS:  Review of Systems   All other systems reviewed and are negative.      Objective:   /60   Pulse 88   Temp (!) 49.8  F (9.9  C)   Resp 16   Ht 1.499 m (4' 11\")   Wt 39.8 kg (87 lb 12.8 oz)   SpO2 94%   BMI 17.73 kg/m    Physical Exam  Constitutional:       General: She is not in acute distress.     Appearance: She is not ill-appearing.   HENT:      Head: Normocephalic.      Nose: Nose normal.      Mouth/Throat:      Mouth: Mucous membranes are moist.   Pulmonary:      Effort: Pulmonary effort is normal.   Musculoskeletal:      Cervical back: Normal range of motion.      Comments: kyphosis   Skin:     Coloration: Skin is pale.   Neurological:      Mental Status: She is alert. She is disoriented and " confused.           Most Recent 3 CBC's:  Recent Labs   Lab Test 10/11/22  1215 09/08/22  1140 08/13/20  1454   WBC 15.0* 11.7* 8.3   HGB 9.8* 10.3* 11.5*   * 100 103*    362 314     Most Recent 3 BMP's:  Recent Labs   Lab Test 09/08/22  1140 04/22/22  0850 08/13/20  1454   * 137 136   POTASSIUM 4.6 4.5 4.2   CHLORIDE 95* 98 101   CO2 26 28 24   BUN 24.5* 27 27   CR 0.79 0.79 0.77   ANIONGAP 13 11 11   KEYANNA 9.2 9.4 9.4   GLC 77 82 80     Most Recent 2 LFT's:  Recent Labs   Lab Test 09/08/22  1140 08/13/20  1454   AST 21 22   ALT 6* 11   ALKPHOS 28* 27*   BILITOTAL 0.5 0.6     Most Recent TSH and T4:No lab results found.  Most Recent Hemoglobin A1c:No lab results found.  Most Recent Anemia Panel:  Recent Labs   Lab Test 10/18/22  1834 10/11/22  1215   WBC  --  15.0*   HGB  --  9.8*   HCT  --  31.2*   MCV  --  102*   PLT  --  356   B12 497  --        Assessment/Plan:  (D64.9) Anemia, unspecified type  (primary encounter diagnosis)  (D72.828) Other elevated white blood cell (WBC) count  Comment: Pt's hgb was decreased recently and pt is pale however she doesn't complain about shortness of breath, dizziness, lightheadedness, or chest pain and denies melena.  A UA showed no signs of UTI.    Will continue ferrous sulfate 325 every other day.    Vitamin B was normal.   Plan: pt has refused further work up.  Will continue to make comfortable.      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    Plan: Continue with plan of care no changes at this time, adjustment as needed    (E44.0) Moderate protein-calorie malnutrition (H)  Comment: pt is very thin and pale. Pt's BMI is 17.7 and pt has placed herself on a specialized diet.   Question if pt has dysphagia.    Plan: Encourage high calorie foods.      (R52) pain  Comment: pt is currently on hydromorphone 2 mg daily with daily  stool softeners  Plan: Continue with plan of care no changes at this time, adjustment as needed          Post Medication Reconciliation Status: Patient was not discharged from an inpatient facility or TCU        Orders:  No new orders   Continue to monitor      Electronically signed by:     Galilea Meredith NP            Sincerely,        Galilea Meredith NP

## 2022-10-19 NOTE — PROGRESS NOTES
"[facility] GERIATRICS    Chief Complaint   Patient presents with     RECHECK     HPI:  Litzy Kenny is a 89 year old  (4/10/1932), who is being seen today for an episodic care visit at: [facility] ([facility]) [[id number]]. Today's concern is abnormal labs.      Her past medical history includes    Paroxysmal atrial fibrillation - declined oral anticoagulation    Altered mental status    Spinal stenosis of lumbar region    Pathologic fracture of the vertebrae    Pulmonary nodule    Protein calorie malnutrition    Chronic pain disorder currently taking dilaudid, lidocaine patch    Closed compression fracture of lumbosacral spine (H)    Controlled substance agreement signed    Dementia in Alzheimer's disease (H) per documentation on 9/9/2020, Patient and family declined Journey program.  She lays on the couch most days and is a 2 person assist.    Drug-induced constipation    HTN (hypertension) - with coreg 6.25 mg BID which was decreased in 8/2022.     Hyponatremia    Normocytic anemia, not due to blood loss    Osteoarthrosis    Allergies, and PMH/PSH reviewed in [facility] today.  REVIEW OF SYSTEMS:  Review of Systems   All other systems reviewed and are negative.      Objective:   /60   Pulse 88   Temp (!) 49.8  F (9.9  C)   Resp 16   Ht 1.499 m (4' 11\")   Wt 39.8 kg (87 lb 12.8 oz)   SpO2 94%   BMI 17.73 kg/m    Physical Exam  Constitutional:       General: She is not in acute distress.     Appearance: She is not ill-appearing.   HENT:      Head: Normocephalic.      Nose: Nose normal.      Mouth/Throat:      Mouth: Mucous membranes are moist.   Pulmonary:      Effort: Pulmonary effort is normal.   Musculoskeletal:      Cervical back: Normal range of motion.      Comments: kyphosis   Skin:     Coloration: Skin is pale.   Neurological:      Mental Status: She is alert. She is disoriented and confused.           Most Recent 3 CBC's:  Recent Labs   Lab Test 10/11/22  1215 09/08/22  1140 " 08/13/20  1454   WBC 15.0* 11.7* 8.3   HGB 9.8* 10.3* 11.5*   * 100 103*    362 314     Most Recent 3 BMP's:  Recent Labs   Lab Test 09/08/22  1140 04/22/22  0850 08/13/20  1454   * 137 136   POTASSIUM 4.6 4.5 4.2   CHLORIDE 95* 98 101   CO2 26 28 24   BUN 24.5* 27 27   CR 0.79 0.79 0.77   ANIONGAP 13 11 11   KEYANNA 9.2 9.4 9.4   GLC 77 82 80     Most Recent 2 LFT's:  Recent Labs   Lab Test 09/08/22  1140 08/13/20  1454   AST 21 22   ALT 6* 11   ALKPHOS 28* 27*   BILITOTAL 0.5 0.6     Most Recent TSH and T4:No lab results found.  Most Recent Hemoglobin A1c:No lab results found.  Most Recent Anemia Panel:  Recent Labs   Lab Test 10/18/22  1834 10/11/22  1215   WBC  --  15.0*   HGB  --  9.8*   HCT  --  31.2*   MCV  --  102*   PLT  --  356   B12 497  --        Assessment/Plan:  (D64.9) Anemia, unspecified type  (primary encounter diagnosis)  (D72.828) Other elevated white blood cell (WBC) count  Comment: Pt's hgb was decreased recently and pt is pale however she doesn't complain about shortness of breath, dizziness, lightheadedness, or chest pain and denies melena.  A UA showed no signs of UTI.    Will continue ferrous sulfate 325 every other day.    Vitamin B was normal.   Plan: pt has refused further work up.  Will continue to make comfortable.      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    Plan: Continue with plan of care no changes at this time, adjustment as needed    (E44.0) Moderate protein-calorie malnutrition (H)  Comment: pt is very thin and pale. Pt's BMI is 17.7 and pt has placed herself on a specialized diet.   Question if pt has dysphagia.    Plan: Encourage high calorie foods.      (R52) pain  Comment: pt is currently on hydromorphone 2 mg daily with daily stool softeners  Plan: Continue with plan of care no changes at this time, adjustment as  needed          Post Medication Reconciliation Status: Patient was not discharged from an inpatient facility or TCU        Orders:  No new orders   Continue to monitor      Electronically signed by:     Galilea Meredith NP

## 2022-10-26 NOTE — LETTER
"    10/26/2022        RE: Litzy Kenny  C/o Ben Kenny  [address] [facility] [address]        [facility] GERIATRICS    Chief Complaint   Patient presents with     RECHECK     HPI:  Litzy Kenny is a 89 year old  (4/10/1932), who is being seen today for an episodic care visit at: [facility] ([facility]) [[id number]]. Today's concern is:   Patient sitting on couch. Without complaints today. Alert to self and others. Recent vitamin D level elevated. Staff report she complains of eye pain. Wounds/buttocks healed using nystatin. HPI difficult to obtain 2/2 cognitive impairment.     BP Readings from Last 3 Encounters:   10/26/22 120/60   10/19/22 120/60   10/12/22 118/51     Pulse Readings from Last 4 Encounters:   10/26/22 88   10/19/22 88   10/12/22 80   10/05/22 81     Wt Readings from Last 4 Encounters:   10/26/22 39.8 kg (87 lb 12.8 oz)   10/19/22 39.8 kg (87 lb 12.8 oz)   10/12/22 40.8 kg (90 lb)   10/05/22 40.8 kg (90 lb)           Allergies, and PMH/PSH reviewed in [facility] today.  REVIEW OF SYSTEMS:  Unobtainable secondary to cognitive impairment.     Objective:   /60   Pulse 88   Temp 97.9  F (36.6  C)   Resp 16   Ht 1.499 m (4' 11.02\")   Wt 39.8 kg (87 lb 12.8 oz)   BMI 17.72 kg/m    A & O x 2, NAD. Lungs CTA, non labored. RRR, S1/S2 w/o murmur,gallop or rub.  edema.  Abdomen soft, nontender, +BT'S. No focal neurological deficits.        Labs done in SNF are in [facility]. Please refer to them using EPIC/Care Everywhere. and Recent labs in [facility] reviewed by me today.     Assessment/Plan:  (E55.9) Vitamin D deficiency  (primary encounter diagnosis)  Comment: Vitamin D level elevated.   Plan:   -Discontinue vitamin D    (H04.129) Dry eye  Comment: Chronic, dry eyes  Plan: Start Systaine eye drops to each eye BID    (T14.90XD) Healing wound  Comment: Buttock wounds healed  Plan: Discontinue nystatin         MED REC REQUIRED  Post Medication Reconciliation Status: patient was not " discharged from an inpatient facility or TCU      Orders:  -Systane eye drops bilateral eyes BID  -Discontinue vitamin D  -Discontinue nystatin     Electronically signed by:   Galilea Meredith NP            Sincerely,        Galilea Meredith, NP

## 2022-10-26 NOTE — PROGRESS NOTES
"[facility] GERIATRICS    Chief Complaint   Patient presents with     RECHECK     HPI:  Litzy Kenny is a 89 year old  (4/10/1932), who is being seen today for an episodic care visit at: [facility]) [[id number]]. Today's concern is:   Patient sitting on couch. Without complaints today. Alert to self and others. Recent vitamin D level elevated. Staff report she complains of eye pain. Wounds/buttocks healed using nystatin. HPI difficult to obtain 2/2 cognitive impairment.     BP Readings from Last 3 Encounters:   10/26/22 120/60   10/19/22 120/60   10/12/22 118/51     Pulse Readings from Last 4 Encounters:   10/26/22 88   10/19/22 88   10/12/22 80   10/05/22 81     Wt Readings from Last 4 Encounters:   10/26/22 39.8 kg (87 lb 12.8 oz)   10/19/22 39.8 kg (87 lb 12.8 oz)   10/12/22 40.8 kg (90 lb)   10/05/22 40.8 kg (90 lb)           Allergies, and PMH/PSH reviewed in [email or web address] today.  REVIEW OF SYSTEMS:  Unobtainable secondary to cognitive impairment.     Objective:   /60   Pulse 88   Temp 97.9  F (36.6  C)   Resp 16   Ht 1.499 m (4' 11.02\")   Wt 39.8 kg (87 lb 12.8 oz)   BMI 17.72 kg/m    A & O x 2, NAD. Lungs CTA, non labored. RRR, S1/S2 w/o murmur,gallop or rub.  edema.  Abdomen soft, nontender, +BT'S. No focal neurological deficits.        Labs done in SNF are in [facility]. Please refer to them using [email or web address]/Care Everywhere. and Recent labs in [facility] reviewed by me today.     Assessment/Plan:  (E55.9) Vitamin D deficiency  (primary encounter diagnosis)  Comment: Vitamin D level elevated.   Plan:   -Discontinue vitamin D    (H04.129) Dry eye  Comment: Chronic, dry eyes  Plan: Start Systaine eye drops to each eye BID    (T14.90XD) Healing wound  Comment: Buttock wounds healed  Plan: Discontinue nystatin         MED REC REQUIRED  Post Medication Reconciliation Status: patient was not discharged from an inpatient facility or U      Orders:  -Systane eye drops bilateral eyes " BID  -Discontinue vitamin D  -Discontinue nystatin     Electronically signed by:   Galilea Meredith NP

## 2023-01-01 ENCOUNTER — LAB REQUISITION (OUTPATIENT)
Dept: LAB | Facility: CLINIC | Age: 88
End: 2023-01-01
Payer: MEDICARE

## 2023-01-01 ENCOUNTER — ASSISTED LIVING VISIT (OUTPATIENT)
Dept: GERIATRICS | Facility: CLINIC | Age: 88
End: 2023-01-01
Payer: MEDICARE

## 2023-01-01 ENCOUNTER — HEALTH MAINTENANCE LETTER (OUTPATIENT)
Age: 88
End: 2023-01-01

## 2023-01-01 ENCOUNTER — TELEPHONE (OUTPATIENT)
Dept: GERIATRICS | Facility: CLINIC | Age: 88
End: 2023-01-01

## 2023-01-01 ENCOUNTER — DOCUMENTATION ONLY (OUTPATIENT)
Dept: GERIATRICS | Facility: CLINIC | Age: 88
End: 2023-01-01
Payer: MEDICARE

## 2023-01-01 VITALS
DIASTOLIC BLOOD PRESSURE: 58 MMHG | BODY MASS INDEX: 16.05 KG/M2 | TEMPERATURE: 97.7 F | OXYGEN SATURATION: 92 % | HEIGHT: 59 IN | RESPIRATION RATE: 18 BRPM | WEIGHT: 79.6 LBS | SYSTOLIC BLOOD PRESSURE: 133 MMHG | HEART RATE: 76 BPM

## 2023-01-01 VITALS
WEIGHT: 86.8 LBS | OXYGEN SATURATION: 94 % | RESPIRATION RATE: 15 BRPM | HEIGHT: 59 IN | SYSTOLIC BLOOD PRESSURE: 108 MMHG | TEMPERATURE: 97.7 F | HEART RATE: 85 BPM | BODY MASS INDEX: 17.5 KG/M2 | DIASTOLIC BLOOD PRESSURE: 57 MMHG

## 2023-01-01 DIAGNOSIS — G89.29 CHRONIC BILATERAL LOW BACK PAIN WITHOUT SCIATICA: ICD-10-CM

## 2023-01-01 DIAGNOSIS — S32.010D COMPRESSION FRACTURE OF L1 VERTEBRA WITH ROUTINE HEALING, SUBSEQUENT ENCOUNTER: ICD-10-CM

## 2023-01-01 DIAGNOSIS — I48.0 PAF (PAROXYSMAL ATRIAL FIBRILLATION) (H): ICD-10-CM

## 2023-01-01 DIAGNOSIS — N32.81 OVERACTIVE BLADDER: ICD-10-CM

## 2023-01-01 DIAGNOSIS — M54.50 CHRONIC BILATERAL LOW BACK PAIN WITHOUT SCIATICA: ICD-10-CM

## 2023-01-01 DIAGNOSIS — F02.80 DEMENTIA IN ALZHEIMER'S DISEASE (H): ICD-10-CM

## 2023-01-01 DIAGNOSIS — I10 PRIMARY HYPERTENSION: ICD-10-CM

## 2023-01-01 DIAGNOSIS — R62.7 ADULT FAILURE TO THRIVE: ICD-10-CM

## 2023-01-01 DIAGNOSIS — G30.9 DEMENTIA IN ALZHEIMER'S DISEASE (H): ICD-10-CM

## 2023-01-01 DIAGNOSIS — R62.7 FAILURE TO THRIVE IN ADULT: ICD-10-CM

## 2023-01-01 DIAGNOSIS — E44.0 MODERATE PROTEIN-CALORIE MALNUTRITION (H): ICD-10-CM

## 2023-01-01 DIAGNOSIS — D64.9 NORMOCYTIC ANEMIA, NOT DUE TO BLOOD LOSS: ICD-10-CM

## 2023-01-01 DIAGNOSIS — F22 PARANOIA (H): Primary | ICD-10-CM

## 2023-01-01 DIAGNOSIS — E44.0 MODERATE PROTEIN-CALORIE MALNUTRITION (H): Primary | ICD-10-CM

## 2023-01-01 LAB
ANION GAP SERPL CALCULATED.3IONS-SCNC: 13 MMOL/L (ref 7–15)
BUN SERPL-MCNC: 39.4 MG/DL (ref 8–23)
CALCIUM SERPL-MCNC: 9.2 MG/DL (ref 8.2–9.6)
CHLORIDE SERPL-SCNC: 99 MMOL/L (ref 98–107)
CREAT SERPL-MCNC: 0.77 MG/DL (ref 0.51–0.95)
DEPRECATED CALCIDIOL+CALCIFEROL SERPL-MC: 66 UG/L (ref 20–75)
DEPRECATED HCO3 PLAS-SCNC: 27 MMOL/L (ref 22–29)
ERYTHROCYTE [DISTWIDTH] IN BLOOD BY AUTOMATED COUNT: 13.4 % (ref 10–15)
GFR SERPL CREATININE-BSD FRML MDRD: 72 ML/MIN/1.73M2
GLUCOSE SERPL-MCNC: 111 MG/DL (ref 70–99)
HBA1C MFR BLD: 5.9 %
HCT VFR BLD AUTO: 35.9 % (ref 35–47)
HGB BLD-MCNC: 10.9 G/DL (ref 11.7–15.7)
MCH RBC QN AUTO: 32.8 PG (ref 26.5–33)
MCHC RBC AUTO-ENTMCNC: 30.4 G/DL (ref 31.5–36.5)
MCV RBC AUTO: 108 FL (ref 78–100)
PLATELET # BLD AUTO: 379 10E3/UL (ref 150–450)
POTASSIUM SERPL-SCNC: 4.4 MMOL/L (ref 3.4–5.3)
RBC # BLD AUTO: 3.32 10E6/UL (ref 3.8–5.2)
SODIUM SERPL-SCNC: 139 MMOL/L (ref 136–145)
TSH SERPL DL<=0.005 MIU/L-ACNC: 1.17 UIU/ML (ref 0.3–4.2)
WBC # BLD AUTO: 13.3 10E3/UL (ref 4–11)

## 2023-01-01 PROCEDURE — 36415 COLL VENOUS BLD VENIPUNCTURE: CPT | Mod: ORL | Performed by: NURSE PRACTITIONER

## 2023-01-01 PROCEDURE — 84443 ASSAY THYROID STIM HORMONE: CPT | Mod: ORL | Performed by: NURSE PRACTITIONER

## 2023-01-01 PROCEDURE — 83036 HEMOGLOBIN GLYCOSYLATED A1C: CPT | Mod: ORL | Performed by: NURSE PRACTITIONER

## 2023-01-01 PROCEDURE — 99350 HOME/RES VST EST HIGH MDM 60: CPT | Performed by: NURSE PRACTITIONER

## 2023-01-01 PROCEDURE — 85027 COMPLETE CBC AUTOMATED: CPT | Mod: ORL | Performed by: NURSE PRACTITIONER

## 2023-01-01 PROCEDURE — 82306 VITAMIN D 25 HYDROXY: CPT | Mod: ORL | Performed by: NURSE PRACTITIONER

## 2023-01-01 PROCEDURE — 80048 BASIC METABOLIC PNL TOTAL CA: CPT | Mod: ORL | Performed by: NURSE PRACTITIONER

## 2023-01-01 RX ORDER — ACETAMINOPHEN 500 MG
TABLET ORAL
Qty: 540 TABLET | Refills: 97 | Status: SHIPPED | OUTPATIENT
Start: 2023-01-01

## 2023-01-01 RX ORDER — MIRABEGRON 50 MG/1
TABLET, FILM COATED, EXTENDED RELEASE ORAL
Qty: 90 TABLET | Refills: 97 | Status: SHIPPED | OUTPATIENT
Start: 2023-01-01

## 2023-01-01 RX ORDER — HYDROMORPHONE HYDROCHLORIDE 2 MG/1
TABLET ORAL
Qty: 90 TABLET | Refills: 0 | Status: SHIPPED | OUTPATIENT
Start: 2023-01-01

## 2023-01-01 RX ORDER — HYDROMORPHONE HYDROCHLORIDE 2 MG/1
TABLET ORAL
Qty: 90 TABLET | Refills: 0 | Status: SHIPPED | OUTPATIENT
Start: 2023-01-01 | End: 2023-01-01

## 2023-01-01 RX ORDER — CALCIUM CARBONATE/VITAMIN D3 600 MG-10
TABLET ORAL
Qty: 180 TABLET | Refills: 97 | Status: SHIPPED | OUTPATIENT
Start: 2023-01-01

## 2023-01-01 RX ORDER — CITALOPRAM HYDROBROMIDE 10 MG/1
TABLET ORAL
Qty: 31 TABLET | Refills: 11 | Status: SHIPPED | OUTPATIENT
Start: 2023-01-01

## 2023-01-01 RX ORDER — LIDOCAINE PAIN RELIEF 40 MG/1000MG
PATCH TOPICAL
Qty: 30 PATCH | Refills: 97 | Status: SHIPPED | OUTPATIENT
Start: 2023-01-01

## 2023-04-26 NOTE — PROGRESS NOTES
"[facility] GERIATRICS    Chief Complaint   Patient presents with     RECHECK     Routine     HPI:  Litzy Kenny is a 89 year old  (4/10/1932), who is being seen today for an episodic care visit at: [facility]) [[id number]]. Today's concern is: Patient resting on couch today, due to severe cognitive impairment unable to give HPI. Repeated questions and answers several times. Denies pain, SOB or lightheadedness. Spouse in room and able to help with cares.     BP Readings from Last 3 Encounters:   05/24/23 133/58   04/26/23 108/57   10/26/22 120/60     Pulse Readings from Last 4 Encounters:   05/24/23 76   04/26/23 85   10/26/22 88   10/19/22 88          Allergies, and PMH/PSH reviewed in [facility] today.  REVIEW OF SYSTEMS:  Unobtainable secondary to cognitive impairment.     Objective:   /57   Pulse 85   Temp 97.7  F (36.5  C)   Resp 15   Ht 1.499 m (4' 11\")   Wt 39.4 kg (86 lb 12.8 oz)   SpO2 94%   BMI 17.53 kg/m    A & O x 2, NAD. Lungs CTA, non labored. RRR, S1/S2 w/o murmur,gallop or rub.  edema.  Abdomen soft, nontender, +BT'S. No focal neurological deficits.        Recent labs in [facility] reviewed by me today.     Assessment/Plan:  (F22) Paranoia (H)  (primary encounter diagnosis)  Chronic, s/s cognitive impairment. Able to redirect easily.   -Continue AL support with medication administration, meals and safety. Expect further decline with progression of diease.     (I48.0) PAF (paroxysmal atrial fibrillation) (H)  Chronic, HR stable. Continue with plan of care no changes at this time, adjustment as needed    (E44.0) Moderate protein-calorie malnutrition (H)  Body mass index is 17.53 kg/m .   -Picky eater and does not like facility food.   -Continue to encourage healthy meals and snacks throughout the day.          MED REC REQUIRED  Post Medication Reconciliation Status: patient was not discharged from an inpatient facility or TCU      Orders:  NNO      Electronically signed by: Galilea" Dara Meredith, NP

## 2023-04-26 NOTE — LETTER
"    4/26/2023        RE: Litzy Kenny  C/o Ben Kenny  [address] [address]        [facility]    Chief Complaint   Patient presents with     RECHECK     Routine     HPI:  Litzy Kenny is a 89 year old  (4/10/1932), who is being seen today for an episodic care visit at: [facility]) [[id number]]. Today's concern is: Patient resting on couch today, due to severe cognitive impairment unable to give HPI. Repeated questions and answers several times. Denies pain, SOB or lightheadedness. Spouse in room and able to help with cares.     BP Readings from Last 3 Encounters:   05/24/23 133/58   04/26/23 108/57   10/26/22 120/60     Pulse Readings from Last 4 Encounters:   05/24/23 76   04/26/23 85   10/26/22 88   10/19/22 88          Allergies, and PMH/PSH reviewed in [facility] today.  REVIEW OF SYSTEMS:  Unobtainable secondary to cognitive impairment.     Objective:   /57   Pulse 85   Temp 97.7  F (36.5  C)   Resp 15   Ht 1.499 m (4' 11\")   Wt 39.4 kg (86 lb 12.8 oz)   SpO2 94%   BMI 17.53 kg/m    A & O x 2, NAD. Lungs CTA, non labored. RRR, S1/S2 w/o murmur,gallop or rub.  edema.  Abdomen soft, nontender, +BT'S. No focal neurological deficits.        Recent labs in [facility] reviewed by me today.     Assessment/Plan:  (F22) Paranoia (H)  (primary encounter diagnosis)  Chronic, s/s cognitive impairment. Able to redirect easily.   -Continue AL support with medication administration, meals and safety. Expect further decline with progression of diease.     (I48.0) PAF (paroxysmal atrial fibrillation) (H)  Chronic, HR stable. Continue with plan of care no changes at this time, adjustment as needed    (E44.0) Moderate protein-calorie malnutrition (H)  Body mass index is 17.53 kg/m .   -Picky eater and does not like facility food.   -Continue to encourage healthy meals and snacks throughout the day.          MED REC REQUIRED  Post Medication Reconciliation Status: patient " was not discharged from an inpatient facility or TCU      Orders:  NNO      Electronically signed by: Galilea Meredith NP          Sincerely,        Galilea Meredith, NP

## 2023-05-24 NOTE — PROGRESS NOTES
"[facility] GERIATRICS    Chief Complaint   Patient presents with     RECHECK     Wt loss     HPI:  Litzy Kenny is a 89 year old  (4/10/1932), who is being seen today for an episodic care visit at: [facility]) [[id number]]. Today's concern is:   Patient is a 89-year-old female with past medical history significant for memory loss, osteoarthritis, hypertension, atrial fibrillation, protein calorie malnutrition.  Staff reports she has had a 7 pound weight loss in 3 months.  Writer met with patient, spouse, daughter Kat to discuss plan of care.  Family does not wish to pursue hospice evaluation for admission at this time would rather have labs drawn and changes in diet.  No recent labs since October 2022 secondary to refusal of lab draws.  No indications of acute illness at the time.  Patient denies nausea, diarrhea, generalized malaise.  No indications of pain.    BP Readings from Last 3 Encounters:   05/24/23 133/58   04/26/23 108/57   10/26/22 120/60     Pulse Readings from Last 4 Encounters:   05/24/23 76   04/26/23 85   10/26/22 88   10/19/22 88     Wt Readings from Last 4 Encounters:   05/24/23 36.1 kg (79 lb 9.6 oz)   04/26/23 39.4 kg (86 lb 12.8 oz)   10/26/22 39.8 kg (87 lb 12.8 oz)   10/19/22 39.8 kg (87 lb 12.8 oz)     Allergies, and PMH/PSH reviewed in [facility] today.  REVIEW OF SYSTEMS:  Unobtainable secondary to cognitive impairment.     Objective:   /58   Pulse 76   Temp 97.7  F (36.5  C)   Resp 18   Ht 1.499 m (4' 11\")   Wt 36.1 kg (79 lb 9.6 oz)   SpO2 92%   BMI 16.08 kg/m    A & O x 1, NAD. Lungs CTA, non labored. RRR, S1/S2 w/o murmur,gallop or rub.  edema.  Abdomen soft, nontender, +BT'S. No focal neurological deficits.        Recent labs in [facility] reviewed by me today.     Assessment/Plan:    ICD-10-CM    1. Moderate protein-calorie malnutrition (H)  E44.0       2. Dementia in Alzheimer's disease (H)  G30.9     F02.80       3. Primary hypertension  I10       4. " Normocytic anemia, not due to blood loss  D64.9       5. Failure to thrive in adult  R62.7         Chronic progressive dementia with weight loss.  7 pounds in the last 3 months.  No behaviors noted, patient isolates in room.  No recent falls.    Increase Ensure to 8 ounces p.o. twice daily    BMP, CBC, TSH, A1c, vitamin D level next lab day    Start soft diet, family feels may be more appealing to patient    OT evaluation and treatment-for adaptive silverware/cup    Chronic hypertension, managed on carvedilol 6.25 mg p.o. twice daily.  Some low BPs will monitor make adjustments as clinically indicated.    Chronic anemia, started on ferrous sulfate no recent lab draws.    CBC next lab day      MED REC REQUIRED  Post Medication Reconciliation Status: patient was not discharged from an inpatient facility or TCU      Orders:  1.  Ensure 8 ounces p.o. twice daily  2.  BMP, CBC, TSH, A1c, vitamin D level  3.  Soft diet  4.  OT evaluation for adaptive equipment, physical therapy consult for failure to thrive    Electronically signed by:   Galilea Meredith NP      Documentation of Face to Face and Certification for Home Health Services    I certify that patient: Litzy Kenny is under my care and that I, or a nurse practitioner or physician's assistant working with me, had a face-to-face encounter that meets the physician face-to-face encounter requirements with this patient on: 5/24/2023.    This encounter with the patient was in whole, or in part, for the following medical condition, which is the primary reason for home health care:   -Difficulty with plates, cups, spoons  -Failure to thrive and weakness    I certify that, based on my findings, the following services are medically necessary home health services: Occupational Therapy and Physical Therapy.    My clinical findings support the need for the above services because: Occupational Therapy Services are needed to assess and treat cognitive ability and address ADL  safety due to impairment in adaptive eating equipment . and Physical Therapy Services are needed to assess and treat the following functional impairments: failure to thrive and weakness.    Further, I certify that my clinical findings support that this patient is homebound (i.e. absences from home require considerable and taxing effort and are for medical reasons or [employer] services or infrequently or of short duration when for other reasons) because: Requires assistance of another person or specialized equipment to access medical services because patient: Requires supervision of another for safe transfer...    Based on the above findings. I certify that this patient is confined to the home and needs intermittent skilled nursing care, physical therapy and/or speech therapy.  The patient is under my care, and I have initiated the establishment of the plan of care.  This patient will be followed by a physician who will periodically review the plan of care.  Physician/Provider to provide follow up care: Galilea Meredith    Attending hospital physician (the Medicare certified [facility] provider): Galilea Meredith NP  Physician Signature: See electronic signature associated with these discharge orders.  Date: 5/24/2023

## 2023-06-11 PROBLEM — F22 PARANOIA (H): Status: ACTIVE | Noted: 2023-01-01

## 2023-08-15 NOTE — PROGRESS NOTES
Geriatrics Notification of Patient Death    Provider: TASHIA Moran CNP  Place of death: [facility]   Facility Type:  AL    Caller: Analisa  Date of Death: 08/15/2023 at 0650    Patient was on Hospice care: Yes; please explain: [facility]  Patient was seen by [facility] Geriatrics provider: Yes; please explain: on 05/24/2023 by TASHIA Moran CNP, RN

## 2023-10-27 NOTE — LETTER
"    11/17/2020        RE: Litzy Kenny  C/o Ben Kenny  [address] [facility] [address]        [facility]  Chief Complaint   Patient presents with     Annual Comprehensive Exam Assisted Living     [place] [facility] Record Number: [id number]  [id number]    HPI:    Litzy Kenny is a 88 year old (4/10/1932), who is being seen today for an annual comprehensive visit. HPI information obtained from: facility chart records, facility staff, patient report and [facility] chart review.     Litzy was visited today as a follow up to last week from cellulitis. Unable to visualize her legs today because lymph wraps are in place but per home care nursing he redness did not seem to improve after the abx treatment. Litzy complains of pain in the left foot while walking. \"bladder doesn't work anymore\" so she has staff come in every two hours to toilet her. She is able to use the walker with staff assist but takes slow, small steps. In the morning, she is unable to use the walker therefore has to use the wheelchair to get to the restroom. Spoke to both Shan and Litzy about at doing a PT assessment at the very least. They would like to think about it at this time.     ALLERGIES: No clinical screening - see comments and Tramadol  PAST MEDICAL HISTORY:  has a past medical history of Cancer (H), Cataract, and Cystoid macular edema.  PAST SURGICAL HISTORY:  has a past surgical history that includes cataract iol, rt/lt.  IMMUNIZATIONS:  Immunization History   Administered Date(s) Administered     FLU 6-35 months 09/22/2012     Flu, Unspecified 11/28/2000, 10/30/2001, 11/04/2002, 11/11/2003, 10/14/2004, 10/21/2005, 10/26/2006, 10/30/2007, 10/21/2008, 10/24/2009, 10/18/2010     Influenza (High Dose) 3 valent vaccine 10/07/2014, 10/08/2015, 11/21/2016, 09/19/2017, 09/23/2019     Influenza (IIV3) PF 11/28/2000, 10/30/2001, 11/04/2002, " 10/14/2004, 10/21/2005, 10/26/2006, 10/30/2007, 10/21/2008, 10/24/2009, 10/18/2010, 10/10/2011, 10/10/2011     Influenza Vaccine IM > 6 months Valent IIV4 10/11/2013     Influenza Vaccine IM Ages 6-35 Months 4 Valent (PF) 10/11/2013     Influenza, Quad, High Dose, Pf, 65yr + 09/24/2020     Pneumo Conj 13-V (2010&after) 11/19/2019     Pneumococcal 23 valent 05/03/1999, 11/19/2009     Td (Adult), Adsorbed 08/15/2002, 03/08/2010     Zoster vaccine, live 10/29/2009     Above immunizations pulled from [facility]. [facility] and facility records also reconciled. Outstanding information sent to  to update [facility].  Future immunizations needed:  TDAP and Provider working with patient, first contact, and facility to administer immunizations.    Current Outpatient Medications   Medication Sig Dispense Refill     ACETAMINOPHEN EXTRA STRENGTH 500 MG tablet TAKE TWO TABLETS (1000MG) BY MOUTH THREE TIMES DAILY 60 tablet PRN     ASPERCREME LIDOCAINE 4 % Patch APPLY 1 PATCH TOPICALLY TO LEFT THIGH (ON IN THE MORNING, OFF AT NIGHT) 30 patch PRN     calcitonin, salmon, (MIACALCIN) 200 UNIT/ACT nasal spray SPRAY 1 SPRAY INTO ONE NOSTRIL ALTERNATING NOSTRILS DAILY. ALTERNATE NOSTRIL EACH DAY. 3.7 mL 97     calcium carbonate 600 mg-vitamin D 400 units (CALTRATE) 600-400 MG-UNIT per tablet TAKE 1 TABLET BY MOUTH TWICE DAILY 60 tablet PRN     carvedilol (COREG) 12.5 MG tablet TAKE 1 TABLET BY MOUTH TWICE DAILY 56 tablet 97     Cetaphil Moisturizing (CETAPHIL) external lotion Apply topically 2 times daily       Cholecalciferol (VITAMIN D3) 50 MCG (2000 UT) CAPS TAKE TWO CAPSULES (100MCG / 4000 UNITS) BY MOUTH ONCE DAILY 56 capsule PRN     HYDROmorphone (DILAUDID) 2 MG tablet TAKE 1 TABLET BY MOUTH THREE TIMES DAILY 90 tablet 0     MYRBETRIQ 50 MG 24 hr tablet TAKE 1 TABLET BY MOUTH ONCE DAILY 30 tablet PRN     senna-docusate (SENOKOT-S/PERICOLACE) 8.6-50 MG tablet Take 1 tablet by mouth daily as needed for constipation    "    VITAMIN D3 25 MCG (1000 UT) tablet TAKE TWO TABLETS (2000 UNITS) BY MOUTH ONCE DAILY 60 tablet PRN       Case Management:  I have reviewed the Assisted Living care plan, current immunizations and preventive care/cancer screening. .Future cancer screening is not clinically indicated secondary to age/goals of care Patient's desire to return to the community is not assessible due to cognitive impairment. Current Level of Care is appropriate.    Advance Directive Discussion:    I reviewed the current advanced directives as reflected in EPIC, the POLST and the facility chart, and verified the congruency of orders. I contacted the first party and discussed the plan of Care. I did not due to cognitive impairment review the advance directives with the resident.     Team Discussion:  I communicated with the appropriate disciplines involved with the Plan of Care: Nursing   and     Patient's goal is pain control and comfort.  Information reviewed:  Medications, vital signs, orders, and nursing notes.    ROS:  4 point ROS including Respiratory, CV, GI and , other than that noted in the HPI,  is negative    Vitals:  /60   Pulse 89   Temp 97.7  F (36.5  C)   Resp 16   Ht 1.499 m (4' 11\")   Wt 40.8 kg (90 lb)   SpO2 95%   BMI 18.18 kg/m   Body mass index is 18.18 kg/m .  Exam:  GENERAL APPEARANCE:  Alert, in no distress, pleasant, cooperative, frail  EYES: no discharge or mattering on lids or lashes noted  ENT:  moist mucous membranes, hearing acuity intact  NECK: supple, symmetrical  RESP: no respiratory distress, Lung sounds clear, patient is on room air  CV:  Rate regular.  Edema trace in left lower extremity, and none in the right lower extremity.  VASCULAR: warm extremities without open areas.  ABDOMEN: normal bowel sounds, soft, nontender.  M/S:   Gait and station: ambulates with walker and assist, uses wheelchair in the morning, no tenderness or swelling of the joints; able to move all " extremities, kyphosis  SKIN:  Inspection and palpation of skin and subcutaneous tissue: (virtual visit with home care RN completed on 11/19/20 and left lower extremity with hilaria color, macerated skin on the toes, small amount of serousangenous drainage on bandages)  NEURO: no facial asymmetry, no speech deficits and able to follow directions, moves all extremities symmetrically  PSYCH:  insight, judgement, and memory impaired affect and mood normal    Lab/Diagnostic data:   CBC RESULTS:   Recent Labs   Lab Test 08/13/20   WBC 8.3   RBC 3.49*   HGB 11.5*   HCT 35.8   *   MCH 33.0   MCHC 32.1   RDW 13.1          Last Basic Metabolic Panel:  Recent Labs   Lab Test 08/13/20 03/05/20    137   POTASSIUM 4.2  --    CHLORIDE 101  --    KEYANNA 9.4  --    CO2 24  --    BUN 27  --    CR 0.77  --    GLC 80  --        Liver Function Studies -   Recent Labs   Lab Test 08/13/20   PROTTOTAL 7.4   ALBUMIN 3.9   BILITOTAL 0.6   ALKPHOS 27*   AST 22   ALT 11       ASSESSMENT/PLAN  Lower extremity edema  Has three opened areas on the LLE with moderate amount of weeping from the left foot. She is noncompliant with elevating legs and sleeps with on the couch with legs in the dependent positioning so having small progress with decreasing the fluid in the bilateral lower extremities. recently treated with cephalexin.   --continue with lymph therapy  --[facility] RN and WO nurse to follow     Paroxysmal atrial fibrillation (H)   essential HTN  Comment: HR and blood pressure controlled. Based on JNC-8 goals,  patients age of 88 year old, no presence of diabetes or CKD, and goals of care goal BP is <150/90 mm Hg. Patient is stable with current plan of care and routine assessment..  --continue with carvedilol to 12.5 mg BID   --Continue to monitor blood pressure and heart rate, adjust medications as needed.     Late onset Alzheimer's disease without behavioral disturbance (H)  Progressing. Ambulating less and appears frail. Believe  that she would benefit from memory care for a more cares. This was discussed with family during a care conference last week.    --recommend memory care  --continue with assisted living for assistance with cares, meals and medications.     Pressure ulcer of coccygeal region, stage 1  Due to spending more time in the sitting position.   -- barrier cream BID to coccyx area. Encourage frequent movement throughout the day.   --encourage sleeping in her hospital bed.       Moderate protein-calorie malnutrition   Body mass index is 18.18 kg/m . due to progression of dementia.   --facility provides three meals per day  --offer ensure once daily    Closed compression fracture of lumbosacral spine with routine healing, subsequent encounter   Spinal stenosis of lumbar region without neurogenic claudication   Chronic bilateral low back pain without sciatica   Chronic pain syndrome   Patient has long history of chronic back pain. Imaging with L3 and L1 compression fracture sustained from fall last year.  Previousy taking 40 mg oxycodone per day which was changed to dilaudid due to altered mental status prior to moving to the [place]. We had discussed tapering off the dilaudid but they have not agreed with this due to the chronic pain. She has significant deformities in her back.   --continue tylenol 500 mg three times a day  --continue aspercreame/lidoderm patch once a day  --dilaudid 2 mg TID    Osteoporosis  --continue with calcitonin spray and calcium/vit D supplements    Electronically signed by:  TASHIA Caro CNP             Sincerely,        TASHIA Caro CNP     Ivermectin Pregnancy And Lactation Text: This medication is Pregnancy Category C and it isn't known if it is safe during pregnancy. It is also excreted in breast milk.